# Patient Record
Sex: MALE | Race: WHITE | NOT HISPANIC OR LATINO | Employment: OTHER | URBAN - METROPOLITAN AREA
[De-identification: names, ages, dates, MRNs, and addresses within clinical notes are randomized per-mention and may not be internally consistent; named-entity substitution may affect disease eponyms.]

---

## 2016-05-16 LAB
CHOLEST SERPL-MCNC: 128 MG/DL
HDLC SERPL-MCNC: 50 MG/DL
LDLC SERPL CALC-MCNC: 64 MG/DL
NONHDLC SERPL-MCNC: 78 MG/DL
TRIGL SERPL-MCNC: 72 MG/DL
VLDL CHOLESTEROL: 14 MG/DL

## 2017-01-06 ENCOUNTER — TELEPHONE (OUTPATIENT)
Dept: CARDIOLOGY | Facility: CLINIC | Age: 77
End: 2017-01-06

## 2017-01-06 NOTE — TELEPHONE ENCOUNTER
Spoke with patient regarding propranolol or bystolic.Dr. Allred's recommendations given.  Patient states his fatigue is better on bystolic but the tremors are worse. Patient states he wants to stop the bystolic and try taking the propranolol 10 mg daily as recommended by neurology to see if his tremors get better. Patient will observe his fatigue and call in 2-3 weeks with an update.

## 2017-04-14 ENCOUNTER — TRANSFERRED RECORDS (OUTPATIENT)
Dept: CARDIOLOGY | Facility: CLINIC | Age: 77
End: 2017-04-14

## 2017-04-14 LAB
ALBUMIN SERPL-MCNC: 3.5 G/DL
ALP SERPL-CCNC: 64 U/L
ALT SERPL-CCNC: 35 U/L
ANION GAP SERPL CALCULATED.3IONS-SCNC: 6 MMOL/L
AST SERPL-CCNC: 19 U/L
BILIRUB SERPL-MCNC: 0.4 MG/DL
BUN SERPL-MCNC: 13 MG/DL
CALCIUM SERPL-MCNC: 8.7 MG/DL
CHLORIDE SERPLBLD-SCNC: 105 MMOL/L
CHOLEST SERPL-MCNC: 121 MG/DL
CO2 SERPL-SCNC: 29 MMOL/L
CREAT SERPL-MCNC: 0.8 MG/DL
ERYTHROCYTE [DISTWIDTH] IN BLOOD BY AUTOMATED COUNT: 12 %
GFR SERPL CREATININE-BSD FRML MDRD: NORMAL ML/MIN/1.73M2
GLUCOSE SERPL-MCNC: 95 MG/DL (ref 70–99)
HCT VFR BLD AUTO: 40.1 %
HDLC SERPL-MCNC: 56 MG/DL
HEMOGLOBIN: 13.5 G/DL
LDLC SERPL CALC-MCNC: 46 MG/DL
MCH RBC QN AUTO: 31.3 PG
MCHC RBC AUTO-ENTMCNC: 33.7 G/DL
MCV RBC AUTO: 93 FL
NONHDLC SERPL-MCNC: 65 MG/DL
PLATELET # BLD AUTO: 200 10^9/L
POTASSIUM SERPL-SCNC: 4.6 MMOL/L
PROT SERPL-MCNC: 7.1 G/DL
RBC # BLD AUTO: 4.31 10^12/L
SODIUM SERPL-SCNC: 140 MMOL/L
TRIGL SERPL-MCNC: 94 MG/DL
VLDL CHOLESTEROL: 19 MG/DL
WBC # BLD AUTO: 4.9 10^9/L

## 2017-05-08 ENCOUNTER — TRANSFERRED RECORDS (OUTPATIENT)
Dept: CARDIOLOGY | Facility: CLINIC | Age: 77
End: 2017-05-08

## 2017-05-10 ENCOUNTER — PRE VISIT (OUTPATIENT)
Dept: CARDIOLOGY | Facility: CLINIC | Age: 77
End: 2017-05-10

## 2017-05-10 DIAGNOSIS — I25.10 CORONARY ARTERY DISEASE INVOLVING NATIVE CORONARY ARTERY OF NATIVE HEART WITHOUT ANGINA PECTORIS: ICD-10-CM

## 2017-05-11 NOTE — TELEPHONE ENCOUNTER
Received records from [facility] for April 2017, labs sent to be entered, records sent to scan

## 2017-05-15 DIAGNOSIS — I10 ESSENTIAL HYPERTENSION, BENIGN: Primary | ICD-10-CM

## 2017-06-02 ENCOUNTER — OFFICE VISIT (OUTPATIENT)
Dept: CARDIOLOGY | Facility: CLINIC | Age: 77
End: 2017-06-02
Attending: INTERNAL MEDICINE
Payer: COMMERCIAL

## 2017-06-02 VITALS
SYSTOLIC BLOOD PRESSURE: 120 MMHG | DIASTOLIC BLOOD PRESSURE: 62 MMHG | WEIGHT: 234 LBS | HEIGHT: 68 IN | HEART RATE: 56 BPM | BODY MASS INDEX: 35.46 KG/M2

## 2017-06-02 DIAGNOSIS — E66.09 NON MORBID OBESITY DUE TO EXCESS CALORIES: ICD-10-CM

## 2017-06-02 DIAGNOSIS — E78.00 PURE HYPERCHOLESTEROLEMIA: Chronic | ICD-10-CM

## 2017-06-02 DIAGNOSIS — I25.10 CORONARY ARTERY DISEASE INVOLVING NATIVE CORONARY ARTERY OF NATIVE HEART WITHOUT ANGINA PECTORIS: ICD-10-CM

## 2017-06-02 DIAGNOSIS — I10 ESSENTIAL HYPERTENSION, BENIGN: Primary | ICD-10-CM

## 2017-06-02 PROCEDURE — 99214 OFFICE O/P EST MOD 30 MIN: CPT | Performed by: INTERNAL MEDICINE

## 2017-06-02 RX ORDER — PROPRANOLOL HYDROCHLORIDE 60 MG/1
TABLET ORAL DAILY
COMMUNITY
End: 2021-05-19 | Stop reason: DRUGHIGH

## 2017-06-02 RX ORDER — ATORVASTATIN CALCIUM 20 MG/1
20 TABLET, FILM COATED ORAL DAILY
Qty: 90 TABLET | Refills: 3 | Status: SHIPPED | OUTPATIENT
Start: 2017-06-02 | End: 2018-07-25

## 2017-06-02 RX ORDER — LISINOPRIL 20 MG/1
20 TABLET ORAL DAILY
Qty: 90 TABLET | Refills: 3 | Status: SHIPPED | OUTPATIENT
Start: 2017-06-02 | End: 2017-07-26 | Stop reason: ALTCHOICE

## 2017-06-02 NOTE — LETTER
6/2/2017    [facility]  [address]  [facility] [address]    RE: Tony Bruce       Dear Colleague,    I had the pleasure of seeing Tony Bruce in the [facility].    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  In 09/2005 we stented his left anterior descending artery because of unstable angina, performing rescue angioplasty of his first diagonal.  His last stress test was in 2009 and appeared to be completely normal.  Tony has always had somewhat labile blood pressure.      Tony returns stating he is doing well from a cardiac standpoint without any chest, arm, neck, jaw or shoulder discomfort, no dyspnea on exertion, orthopnea or PND, no palpitations, lightheadedness, dizziness, syncope or near-syncope.  He states he went through his shoulder surgery this past year that was the worst he has ever been through.  He states he had so much pain but had no cardiac issues.      He has no formal exercise regimen.  He follows no regular diet and unfortunately has gained weight.      Last year he was concerned about fatigue, tiredness and lack of energy.  We tried stopping his propranolol which he is on for his resting tremors, and this made no difference in his fatigue.  Ultimately his gabapentin was weaned off, and he states he has got a lot more energy since that change has been made.  He is back on propranolol 60 mg once a day.  He was actually on the immediate-release previously and is much happier on 60 mg slow release.  Not only is his tremors better, he thinks this has helped control his blood pressure better.     Outpatient Encounter Prescriptions as of 6/2/2017   Medication Sig Dispense Refill     propranolol HCl 60 MG TABS Take by mouth daily       atorvastatin  (LIPITOR) 20 MG tablet Take 1 tablet (20 mg) by mouth daily 90 tablet 3     lisinopril (PRINIVIL/ZESTRIL) 20 MG tablet Take 1 tablet (20 mg) by mouth daily 90 tablet 3     ALPRAZolam (XANAX) 0.25 MG tablet Take 0.25 mg by mouth 2 times daily       amoxicillin (AMOXIL) 500 MG capsule Take 500 mg by mouth 3 times daily Before dental work       methocarbamol (ROBAXIN) 500 MG tablet Take 500 mg by mouth 3 times daily as needed        primidone (MYSOLINE) 50 MG tablet Take 50 mg by mouth daily       Aspirin (ASPIR-81 PO)        [DISCONTINUED] nebivolol (BYSTOLIC) 10 MG tablet Take 1 tablet (10 mg) by mouth daily 90 tablet 3     [DISCONTINUED] cetirizine (ZYRTEC) 10 MG tablet Take 10 mg by mouth daily       [DISCONTINUED] lisinopril (PRINIVIL,ZESTRIL) 20 MG tablet Take 1 tablet (20 mg) by mouth daily 90 tablet 3     [DISCONTINUED] atorvastatin (LIPITOR) 20 MG tablet Take 1 tablet (20 mg) by mouth daily 90 tablet 3     No facility-administered encounter medications on file as of 6/2/2017.       ASSESSMENT AND PLAN:  Tony appears to be doing quite well from a cardiac standpoint without clinical evidence of ischemia, heart failure or significant arrhythmia.      Blood pressure is very well controlled today at 120/62 with a pulse of 56.  We talked about propranolol, Bystolic and we will clearly stay with the propranolol because of beneficial effects on the tremors, and he states the fatigue and tiredness is much improved off the gabapentin.      Weight unfortunately is 234 pounds, giving him a body mass index of 35.7.  He is moving towards the morbidly obese category.  This is unfortunately up 5 pounds from last year.  We talked about the fact that he has to do some sort of exercise, and he has got to watch his calorie intake.      Fasting lipid profile is outstanding.  Total cholesterol is 121, HDL 56, LDL 46, triglycerides are 94.  We talked about his fasting lipid profile and compared the trends over the years, and he is  doing outstanding.      We also talked about his basic metabolic profile.  He asks how we know whether his kidneys are functioning okay, and I explained the mechanism of how creatinine works and indirectly measures kidney function.  At this time, he is doing quite well.  We reviewed his medications.  We will continue them all as is.  I gave him refills for his atorvastatin and his lisinopril.  [facility].  I will plan on having him return in 1 year.  If he should have any problems, I would be glad to see him sooner.      Thank you for allowing me to participate in his care.     Sincerely,    Nabeel Allred MD     [facility]

## 2017-06-02 NOTE — MR AVS SNAPSHOT
After Visit Summary   6/2/2017    Tony Bruce    MRN: [id number]           Patient Information     Date Of Birth          1940        Visit Information        Provider Department      6/2/2017 10:15 AM Nabeel Allred MD [facility]        Today's Diagnoses     Essential hypertension, benign    -  1    Coronary artery disease involving native coronary artery of native heart without angina pectoris        Pure hypercholesterolemia        Non morbid obesity due to excess calories           Follow-ups after your visit        Additional Services     Follow-Up with Cardiologist                 Future tests that were ordered for you today     Open Future Orders        Priority Expected Expires Ordered    Basic metabolic panel Routine 6/2/2018 7/7/2018 6/2/2017    Lipid Profile Routine 6/2/2018 7/7/2018 6/2/2017    Follow-Up with Cardiologist Routine 6/2/2018 10/15/2018 6/2/2017            Who to contact     If you have questions or need follow up information about today's clinic visit or your schedule please contact [facility] directly at [phone].  Normal or non-critical lab and imaging results will be communicated to you by TrepUphart, letter or phone within 4 business days after the clinic has received the results. If you do not hear from us within 7 days, please contact the clinic through TrepUphart or phone. If you have a critical or abnormal lab result, we will notify you by phone as soon as possible.  Submit refill requests through theBench or call your pharmacy and they will forward the refill request to us. Please allow 3 business days for your refill to be completed.          Additional Information About Your Visit        TrepUphart Information     theBench lets you send messages to your doctor, view your test results, renew your prescriptions, schedule appointments and more. To sign up, go to  "[email or web address] . Click on \"Log in\" on the left side of the screen, which will take you to the Welcome page. Then click on \"Sign up Now\" on the right side of the page.     You will be asked to enter the access code listed below, as well as some personal information. Please follow the directions to create your username and password.     Your access code is: [email or web address]  Expires: 2017 11:13 AM     Your access code will  in 90 days. If you need help or a new code, please call your [place] clinic or [phone].        Care EveryWhere ID     This is your Care EveryWhere ID. This could be used by other organizations to access your [place] medical records  [id number]        Your Vitals Were     Pulse Height BMI (Body Mass Index)             56 1.727 m (5' 8\") 35.58 kg/m2          Blood Pressure from Last 3 Encounters:   17 120/62   16 118/58   16 158/80    Weight from Last 3 Encounters:   17 106.1 kg (234 lb)   16 103.9 kg (229 lb)   16 105.4 kg (232 lb 6.4 oz)              We Performed the Following     Follow-Up with Cardiac Advanced Practice Provider          Today's Medication Changes          These changes are accurate as of: 17 11:13 AM.  If you have any questions, ask your nurse or doctor.               Stop taking these medicines if you haven't already. Please contact your care team if you have questions.     nebivolol 10 MG tablet   Commonly known as:  BYSTOLIC   Stopped by:  Nabeel Allred MD                Where to get your medicines      Some of these will need a paper prescription and others can be bought over the counter.  Ask your nurse if you have questions.     Bring a paper prescription for each of these medications     atorvastatin 20 MG tablet    lisinopril 20 MG tablet                Primary Care Provider Fax #    [facility] [phone]       [address] [facility] [address]        Thank you!     Thank " you for choosing [facility] [place]  for your care. Our goal is always to provide you with excellent care. Hearing back from our patients is one way we can continue to improve our services. Please take a few minutes to complete the written survey that you may receive in the mail after your visit with us. Thank you!             Your Updated Medication List - Protect others around you: Learn how to safely use, store and throw away your medicines at www.disposemymeds.org.          This list is accurate as of: 6/2/17 11:13 AM.  Always use your most recent med list.                   Brand Name Dispense Instructions for use    ALPRAZolam 0.25 MG tablet    XANAX     Take 0.25 mg by mouth 2 times daily       amoxicillin 500 MG capsule    AMOXIL     Take 500 mg by mouth 3 times daily Before dental work       ASPIR-81 PO          atorvastatin 20 MG tablet    LIPITOR    90 tablet    Take 1 tablet (20 mg) by mouth daily       lisinopril 20 MG tablet    PRINIVIL/ZESTRIL    90 tablet    Take 1 tablet (20 mg) by mouth daily       methocarbamol 500 MG tablet    ROBAXIN     Take 500 mg by mouth 3 times daily as needed       primidone 50 MG tablet    MYSOLINE     Take 50 mg by mouth daily       propranolol HCl 60 MG Tabs      Take by mouth daily

## 2017-06-02 NOTE — PROGRESS NOTES
HPI and Plan:   See dictation    Orders Placed This Encounter   Procedures     Basic metabolic panel     Lipid Profile     Follow-Up with Cardiologist       Orders Placed This Encounter   Medications     propranolol HCl 60 MG TABS     Sig: Take by mouth daily     atorvastatin (LIPITOR) 20 MG tablet     Sig: Take 1 tablet (20 mg) by mouth daily     Dispense:  90 tablet     Refill:  3     lisinopril (PRINIVIL/ZESTRIL) 20 MG tablet     Sig: Take 1 tablet (20 mg) by mouth daily     Dispense:  90 tablet     Refill:  3       Medications Discontinued During This Encounter   Medication Reason     cetirizine (ZYRTEC) 10 MG tablet Stopped by Patient     nebivolol (BYSTOLIC) 10 MG tablet Alternate therapy     atorvastatin (LIPITOR) 20 MG tablet Reorder     lisinopril (PRINIVIL,ZESTRIL) 20 MG tablet Reorder         Encounter Diagnoses   Name Primary?     Coronary artery disease involving native coronary artery of native heart without angina pectoris      Essential hypertension, benign Yes     Pure hypercholesterolemia      Non morbid obesity due to excess calories        CURRENT MEDICATIONS:  Current Outpatient Prescriptions   Medication Sig Dispense Refill     propranolol HCl 60 MG TABS Take by mouth daily       atorvastatin (LIPITOR) 20 MG tablet Take 1 tablet (20 mg) by mouth daily 90 tablet 3     lisinopril (PRINIVIL/ZESTRIL) 20 MG tablet Take 1 tablet (20 mg) by mouth daily 90 tablet 3     ALPRAZolam (XANAX) 0.25 MG tablet Take 0.25 mg by mouth 2 times daily       amoxicillin (AMOXIL) 500 MG capsule Take 500 mg by mouth 3 times daily Before dental work       methocarbamol (ROBAXIN) 500 MG tablet Take 500 mg by mouth 3 times daily as needed        primidone (MYSOLINE) 50 MG tablet Take 50 mg by mouth daily       Aspirin (ASPIR-81 PO)        [DISCONTINUED] lisinopril (PRINIVIL,ZESTRIL) 20 MG tablet Take 1 tablet (20 mg) by mouth daily 90 tablet 3     [DISCONTINUED] atorvastatin (LIPITOR) 20 MG tablet Take 1 tablet (20 mg) by  mouth daily 90 tablet 3       ALLERGIES     Allergies   Allergen Reactions     Morphine      confusion       PAST MEDICAL HISTORY:  Past Medical History:   Diagnosis Date     Coronary atherosclerosis of unspecified type of vessel, native or graft 8/01    cardiac cath 2005: GER to LAD; cath 2002: GER to RCA     Essential hypertension, benign     INTERMITTENT HYPERTENSION     Generalized osteoarthrosis, unspecified site      Hyperlipidaemia      Hypertrophy of prostate without urinary obstruction and other lower urinary tract symptoms (LUTS)     [facility] - Dr Pinto     Impotence of organic origin     Dr Pinto - Dr Allred     NONSPECIFIC MEDICAL HISTORY     CERVICAL/LUMBAR RADICULOPATHY     NONSPECIFIC MEDICAL HISTORY     SHOULDER IMPINGEMENT     NSTEMI (non-ST elevated myocardial infarction) (H)     inferior 2001     Obese      Other and unspecified hyperlipidemia     ?       PAST SURGICAL HISTORY:  Past Surgical History:   Procedure Laterality Date     C TOTAL KNEE ARTHROPLASTY  7/04    Knee Replacement, Total - LEFT Dr Regan     CARDIAC NUC DANISHA STRESS TEST NL  4/09    normal     COLONOSCOPY  6/06    normal - diverticulosis - dr adams     COLONOSCOPY  8/14/2012    Procedure: COLONOSCOPY;  COLONOSCOPY SCREENING/ 6 YEAR FOLLOW UP;  Surgeon: Rey Adams MD;  Location: [facility]     HC REPAIR UMBILICAL CHIKA,5+Y/O,REDUC  9/04    dr dominique     SURGICAL HISTORY OF -   1990    S/P CERVICAL DISCECTOMY x 2     SURGICAL HISTORY OF -   1990    S/P LUMBAR DISCECTOMY x 2     SURGICAL HISTORY OF -   1993    S/P LT CARPAL TUNNEL SURG/SHOULDER IMPINGEMENT     SURGICAL HISTORY OF -   8/01    S/P STENT OF RT CORONARY ARTERY     SURGICAL HISTORY OF -   9/05    Drug eluting stent to LAD       FAMILY HISTORY:  Family History   Problem Relation Age of Onset     Respiratory Father      COPD     CEREBROVASCULAR DISEASE Mother      CVA     C.A.D. Brother      CABG - after CABG x 3 - rheumatic fever as a child     Breast Cancer Sister   "    CEREBROVASCULAR DISEASE Sister        SOCIAL HISTORY:  Social History     Social History     Marital status:      Spouse name: paola     Number of children: 4     Years of education: 14     Social History Main Topics     Smoking status: Never Smoker     Smokeless tobacco: None     Alcohol use No     Drug use: No     Sexual activity: Yes     Partners: Female     Other Topics Concern     Caffeine Concern Yes     3-4 cups qd     Exercise Yes     limited     Seat Belt Yes     Social History Narrative       Review of Systems:  Skin:  Negative       Eyes:  Positive for glasses    ENT:  Negative      Respiratory:  Negative       Cardiovascular:  Negative      Gastroenterology: Negative      Genitourinary:  not assessed      Musculoskeletal:  Positive for arthritis;back pain    Neurologic:  Positive for numbness or tingling of hands    Psychiatric:  Negative      Heme/Lymph/Imm:  Negative      Endocrine:  Negative        Physical Exam:  Vitals: /62  Pulse 56  Ht 1.727 m (5' 8\")  Wt 106.1 kg (234 lb)  BMI 35.58 kg/m2    Constitutional:  cooperative, alert and oriented, well developed, well nourished, in no acute distress obese      Skin:  warm and dry to the touch, no apparent skin lesions or masses noted        Head:  normocephalic, no masses or lesions        Eyes:  pupils equal and round;conjunctivae and lids unremarkable;sclera white;no xanthalasma;no nystagmus        ENT:  no pallor or cyanosis, dentition good        Neck:  carotid pulses are full and equal bilaterally;no carotid bruit        Chest:  normal breath sounds, clear to auscultation, normal A-P diameter, normal symmetry, normal respiratory excursion, no use of accessory muscles          Cardiac: regular rhythm;normal S1 and S2;no S3 or S4;no murmurs, gallops or rubs detected                  Abdomen:    obese      Vascular: pulses full and equal                                        Extremities and Back:  no edema;no spinal " abnormalities noted;normal muscle strength and tone              Neurological:  affect appropriate, oriented to time, person and place;no gross motor deficits              CC  Nabeel Allred MD  [facility]  [address]  [place] [address]

## 2017-06-03 NOTE — PROGRESS NOTES
HISTORY OF PRESENT ILLNESS:  Tony is a very nice 77-year-old gentleman with past medical history significant for hypertension, hypercholesterolemia, central obesity and inferior wall myocardial infarction with stenting of his right coronary artery in 08/2001.  In 09/2005 we stented his left anterior descending artery because of unstable angina, performing rescue angioplasty of his first diagonal.  His last stress test was in 2009 and appeared to be completely normal.  Tony has always had somewhat labile blood pressure.      Tony returns stating he is doing well from a cardiac standpoint without any chest, arm, neck, jaw or shoulder discomfort, no dyspnea on exertion, orthopnea or PND, no palpitations, lightheadedness, dizziness, syncope or near-syncope.  He states he went through his shoulder surgery this past year that was the worst he has ever been through.  He states he had so much pain but had no cardiac issues.      He has no formal exercise regimen.  He follows no regular diet and unfortunately has gained weight.      Last year he was concerned about fatigue, tiredness and lack of energy.  We tried stopping his propranolol which he is on for his resting tremors, and this made no difference in his fatigue.  Ultimately his gabapentin was weaned off, and he states he has got a lot more energy since that change has been made.  He is back on propranolol 60 mg once a day.  He was actually on the immediate-release previously and is much happier on 60 mg slow release.  Not only is his tremors better, he thinks this has helped control his blood pressure better.      ASSESSMENT AND PLAN:  Tony appears to be doing quite well from a cardiac standpoint without clinical evidence of ischemia, heart failure or significant arrhythmia.      Blood pressure is very well controlled today at 120/62 with a pulse of 56.  We talked about propranolol, Bystolic and we will clearly stay with the propranolol because of beneficial  effects on the tremors, and he states the fatigue and tiredness is much improved off the gabapentin.      Weight unfortunately is 234 pounds, giving him a body mass index of 35.7.  He is moving towards the morbidly obese category.  This is unfortunately up 5 pounds from last year.  We talked about the fact that he has to do some sort of exercise, and he has got to watch his calorie intake.      Fasting lipid profile is outstanding.  Total cholesterol is 121, HDL 56, LDL 46, triglycerides are 94.  We talked about his fasting lipid profile and compared the trends over the years, and he is doing outstanding.      We also talked about his basic metabolic profile.  He asks how we know whether his kidneys are functioning okay, and I explained the mechanism of how creatinine works and indirectly measures kidney function.  At this time, he is doing quite well.  We reviewed his medications.  We will continue them all as is.  I gave him refills for his atorvastatin and his lisinopril.  [facility].  I will plan on having him return in 1 year.  If he should have any problems, I would be glad to see him sooner.      Thank you for allowing me to participate in his care.         BARBIE ROBERTO MD, [facility]             D: 2017 11:12   T: 2017 21:56   MT: DIDIER      Name:     SHANA PAYNE   MRN:      [id number]        Account:      [id number]   :      1940           Service Date: 2017      Document: [id number]

## 2017-07-26 ENCOUNTER — TELEPHONE (OUTPATIENT)
Dept: CARDIOLOGY | Facility: CLINIC | Age: 77
End: 2017-07-26

## 2017-07-26 DIAGNOSIS — I10 BENIGN ESSENTIAL HYPERTENSION: Primary | ICD-10-CM

## 2017-07-26 RX ORDER — LOSARTAN POTASSIUM 25 MG/1
25 TABLET ORAL DAILY
Qty: 90 TABLET | Refills: 3 | Status: SHIPPED | OUTPATIENT
Start: 2017-07-26 | End: 2017-08-28

## 2017-08-25 ENCOUNTER — DOCUMENTATION ONLY (OUTPATIENT)
Dept: CARDIOLOGY | Facility: CLINIC | Age: 77
End: 2017-08-25

## 2017-08-25 DIAGNOSIS — I10 BENIGN ESSENTIAL HYPERTENSION: ICD-10-CM

## 2017-08-25 NOTE — PROGRESS NOTES
[facility] losartan. Per telephone notes in July, patient called for an alternative medication and dosage after [facility]. Dr. Allred recommended losartan 25mg daily as a replacement, script and paperwork notes [facility] on [facility]17. Faxed copy of telephone exchange to [facility].  Attempted to contact patient to ask what dose he is currently being given by the [facility] and how well he is tolerating this. Left message for patient to call back.    8/28/17 11:30 Patient stopped at the clinic this AM with [facility]. Patient was issued losartan 50mg daily [facility] on 7/26/17. Patient states since using this dose he has felt weaker than usual with some concerns about pains in his knees and shoulder. Patient's home BP is 137/70. Reviewed with patient that [facility] from [facility] with dose discrepancy was received 8/25/17 and that telephone notes were faxed to [facility] to review. Patient requested a copy of his script from Dr. Allred for losartan 25mg and the phone notes on paper, he will [facility] these to the [facility] to discuss as he would prefer to be using losartan 25mg daily as recommended by Dr. Allred.     Patient will call back if he needs further assistance.

## 2017-08-28 RX ORDER — LOSARTAN POTASSIUM 25 MG/1
25 TABLET ORAL DAILY
Qty: 90 TABLET | Refills: 3 | Status: SHIPPED | OUTPATIENT
Start: 2017-08-28 | End: 2018-07-25 | Stop reason: DRUGHIGH

## 2017-09-05 ENCOUNTER — MEDICAL CORRESPONDENCE (OUTPATIENT)
Dept: HEALTH INFORMATION MANAGEMENT | Facility: CLINIC | Age: 77
End: 2017-09-05

## 2017-09-11 ENCOUNTER — DOCUMENTATION ONLY (OUTPATIENT)
Dept: CARDIOLOGY | Facility: CLINIC | Age: 77
End: 2017-09-11

## 2017-09-11 NOTE — PROGRESS NOTES
"Letter from Dr. Riggs, Neurology at the [facility]. Letter notes patient c/o of generalized arthralgia since starting losartan. Provider asking if Dr. Miller feels this is related to the losartan. [facility] by [facility] as an alternative to lisinopril (side effect of cough).   Will message Dr. Allred to review.    Per Dr. Allred's recommendation \"I do not think so but we try a 2 week holiday\"  Called patient with recommendation to try holding losartan for 2 weeks. Patient states he has a poor memory but will try to call with an update in 2 weeks.  "

## 2017-09-26 ENCOUNTER — TELEPHONE (OUTPATIENT)
Dept: CARDIOLOGY | Facility: CLINIC | Age: 77
End: 2017-09-26

## 2017-09-26 NOTE — TELEPHONE ENCOUNTER
Contacted patient to review effect of holding losartan on his arthralgia symptoms. Patient states he did notice any improvement and will restart his losartan at the previous dose.

## 2017-10-12 ENCOUNTER — TRANSFERRED RECORDS (OUTPATIENT)
Dept: HEALTH INFORMATION MANAGEMENT | Facility: CLINIC | Age: 77
End: 2017-10-12

## 2017-10-12 LAB
ALBUMIN SERPL-MCNC: 3.7 G/DL
ALP SERPL-CCNC: 58 U/L
ALT SERPL-CCNC: 33 U/L
ANION GAP SERPL CALCULATED.3IONS-SCNC: 5 MMOL/L
AST SERPL-CCNC: 24 U/L
BILIRUB SERPL-MCNC: 0.3 MG/DL
BUN SERPL-MCNC: 21 MG/DL
CALCIUM SERPL-MCNC: 8.7 MG/DL
CHLORIDE SERPLBLD-SCNC: 108 MMOL/L
CHOLEST SERPL-MCNC: 148 MG/DL
CO2 SERPL-SCNC: 27 MMOL/L
CREAT SERPL-MCNC: 0.8 MG/DL
GFR SERPL CREATININE-BSD FRML MDRD: >60 ML/MIN/1.73M2
GLUCOSE SERPL-MCNC: 82 MG/DL (ref 74–106)
HDLC SERPL-MCNC: 62 MG/DL
LDLC SERPL CALC-MCNC: 69 MG/DL
NONHDLC SERPL-MCNC: NORMAL MG/DL
POTASSIUM SERPL-SCNC: 4.1 MMOL/L
PROT SERPL-MCNC: 7.3 G/DL
SODIUM SERPL-SCNC: 140 MMOL/L
TRIGL SERPL-MCNC: 84 MG/DL

## 2018-05-09 ENCOUNTER — TRANSFERRED RECORDS (OUTPATIENT)
Dept: HEALTH INFORMATION MANAGEMENT | Facility: CLINIC | Age: 78
End: 2018-05-09

## 2018-05-14 ENCOUNTER — TRANSFERRED RECORDS (OUTPATIENT)
Dept: HEALTH INFORMATION MANAGEMENT | Facility: CLINIC | Age: 78
End: 2018-05-14

## 2018-06-25 ENCOUNTER — TRANSFERRED RECORDS (OUTPATIENT)
Dept: HEALTH INFORMATION MANAGEMENT | Facility: CLINIC | Age: 78
End: 2018-06-25

## 2018-07-12 ENCOUNTER — PRE VISIT (OUTPATIENT)
Dept: CARDIOLOGY | Facility: CLINIC | Age: 78
End: 2018-07-12

## 2018-07-12 DIAGNOSIS — E78.00 PURE HYPERCHOLESTEROLEMIA: ICD-10-CM

## 2018-07-12 DIAGNOSIS — I10 BENIGN ESSENTIAL HYPERTENSION: Primary | ICD-10-CM

## 2018-07-25 ENCOUNTER — OFFICE VISIT (OUTPATIENT)
Dept: CARDIOLOGY | Facility: CLINIC | Age: 78
End: 2018-07-25
Payer: COMMERCIAL

## 2018-07-25 VITALS
DIASTOLIC BLOOD PRESSURE: 80 MMHG | WEIGHT: 240.5 LBS | SYSTOLIC BLOOD PRESSURE: 138 MMHG | BODY MASS INDEX: 36.45 KG/M2 | HEART RATE: 62 BPM | HEIGHT: 68 IN

## 2018-07-25 DIAGNOSIS — E87.5 HYPERKALEMIA: ICD-10-CM

## 2018-07-25 DIAGNOSIS — I25.10 CORONARY ARTERY DISEASE INVOLVING NATIVE CORONARY ARTERY OF NATIVE HEART WITHOUT ANGINA PECTORIS: Primary | ICD-10-CM

## 2018-07-25 DIAGNOSIS — I25.10 CORONARY ARTERY DISEASE INVOLVING NATIVE CORONARY ARTERY OF NATIVE HEART WITHOUT ANGINA PECTORIS: ICD-10-CM

## 2018-07-25 DIAGNOSIS — I10 ESSENTIAL HYPERTENSION, BENIGN: ICD-10-CM

## 2018-07-25 DIAGNOSIS — E78.00 PURE HYPERCHOLESTEROLEMIA: Primary | Chronic | ICD-10-CM

## 2018-07-25 DIAGNOSIS — E66.09 NON MORBID OBESITY DUE TO EXCESS CALORIES: ICD-10-CM

## 2018-07-25 DIAGNOSIS — G47.33 OSA (OBSTRUCTIVE SLEEP APNEA): Chronic | ICD-10-CM

## 2018-07-25 DIAGNOSIS — E78.00 PURE HYPERCHOLESTEROLEMIA: ICD-10-CM

## 2018-07-25 DIAGNOSIS — I10 BENIGN ESSENTIAL HYPERTENSION: ICD-10-CM

## 2018-07-25 DIAGNOSIS — N52.9 IMPOTENCE OF ORGANIC ORIGIN: ICD-10-CM

## 2018-07-25 LAB
ANION GAP SERPL CALCULATED.3IONS-SCNC: 12.3 MMOL/L (ref 6–17)
BUN SERPL-MCNC: 17 MG/DL (ref 7–30)
CALCIUM SERPL-MCNC: 9 MG/DL (ref 8.5–10.5)
CHLORIDE SERPL-SCNC: 104 MMOL/L (ref 98–107)
CHOLEST SERPL-MCNC: 145 MG/DL
CO2 SERPL-SCNC: 30 MMOL/L (ref 23–29)
CREAT SERPL-MCNC: 1 MG/DL (ref 0.7–1.3)
GFR SERPL CREATININE-BSD FRML MDRD: 72 ML/MIN/1.7M2
GLUCOSE SERPL-MCNC: 101 MG/DL (ref 70–105)
HDLC SERPL-MCNC: 46 MG/DL
LDLC SERPL CALC-MCNC: 77 MG/DL
NONHDLC SERPL-MCNC: 99 MG/DL
POTASSIUM SERPL-SCNC: 5.3 MMOL/L (ref 3.5–5.1)
PSA SERPL-ACNC: 4.87 UG/L (ref 0–4)
SODIUM SERPL-SCNC: 141 MMOL/L (ref 136–145)
TRIGL SERPL-MCNC: 112 MG/DL

## 2018-07-25 PROCEDURE — G0103 PSA SCREENING: HCPCS | Performed by: INTERNAL MEDICINE

## 2018-07-25 PROCEDURE — 80061 LIPID PANEL: CPT | Performed by: INTERNAL MEDICINE

## 2018-07-25 PROCEDURE — 80048 BASIC METABOLIC PNL TOTAL CA: CPT | Performed by: INTERNAL MEDICINE

## 2018-07-25 PROCEDURE — 99214 OFFICE O/P EST MOD 30 MIN: CPT | Performed by: INTERNAL MEDICINE

## 2018-07-25 PROCEDURE — 82306 VITAMIN D 25 HYDROXY: CPT | Performed by: INTERNAL MEDICINE

## 2018-07-25 PROCEDURE — 36415 COLL VENOUS BLD VENIPUNCTURE: CPT | Performed by: INTERNAL MEDICINE

## 2018-07-25 RX ORDER — ATORVASTATIN CALCIUM 20 MG/1
20 TABLET, FILM COATED ORAL DAILY
Qty: 90 TABLET | Refills: 3 | Status: SHIPPED | OUTPATIENT
Start: 2018-07-25 | End: 2019-07-25

## 2018-07-25 RX ORDER — LOSARTAN POTASSIUM 50 MG/1
50 TABLET ORAL DAILY
Qty: 90 TABLET | Refills: 3 | Status: SHIPPED | OUTPATIENT
Start: 2018-07-25 | End: 2019-07-25

## 2018-07-25 RX ORDER — DOCUSATE SODIUM 100 MG/1
100 CAPSULE, LIQUID FILLED ORAL 2 TIMES DAILY
COMMUNITY
End: 2020-04-08

## 2018-07-25 RX ORDER — POLYETHYLENE GLYCOL 3350 17 G/17G
1 POWDER, FOR SOLUTION ORAL PRN
COMMUNITY

## 2018-07-25 RX ORDER — FAMOTIDINE 20 MG
TABLET ORAL DAILY
COMMUNITY

## 2018-07-25 RX ORDER — LOSARTAN POTASSIUM 50 MG/1
50 TABLET ORAL DAILY
COMMUNITY
End: 2018-07-25

## 2018-07-25 RX ORDER — OMEGA-3 FATTY ACIDS/FISH OIL 300-1000MG
800 CAPSULE ORAL 2 TIMES DAILY
COMMUNITY
End: 2020-04-08 | Stop reason: DRUGHIGH

## 2018-07-25 RX ORDER — HYDROCORTISONE 2.5 %
CREAM (GRAM) TOPICAL 2 TIMES DAILY
COMMUNITY

## 2018-07-25 RX ORDER — UREA 40 %
CREAM (GRAM) TOPICAL
COMMUNITY
End: 2024-05-01

## 2018-07-25 RX ORDER — LIDOCAINE 50 MG/G
1 PATCH TOPICAL
COMMUNITY

## 2018-07-25 NOTE — PROGRESS NOTES
HPI and Plan:   See dictation    Orders Placed This Encounter   Procedures     Basic metabolic panel     Basic metabolic panel     Lipid Profile     Follow-Up with Cardiologist       Orders Placed This Encounter   Medications     aspirin 81 MG tablet     Sig: Take 81 mg by mouth daily     DISCONTD: losartan (COZAAR) 50 MG tablet     Sig: Take 50 mg by mouth daily     Calcium Carb-Cholecalciferol (CALCIUM-VITAMIN D3) 250-125 MG-UNIT TABS     Sig: Take 2 tablets by mouth 2 times daily     Vitamin D, Cholecalciferol, 1000 units CAPS     Sig: Take by mouth daily     diclofenac (VOLTAREN) 1 % GEL topical gel     Sig: Place onto the skin as needed for moderate pain     docusate sodium (COLACE) 100 MG capsule     Sig: Take 100 mg by mouth 2 times daily     hydrocortisone 2.5 % cream     Sig: Apply topically 2 times daily     ibuprofen 200 MG capsule     Sig: Take 800 mg by mouth 2 times daily     lidocaine (LIDODERM) 5 % Patch     Sig: Place 1 patch onto the skin     polyethylene glycol (MIRALAX/GLYCOLAX) Packet     Sig: Take 1 packet by mouth as needed for constipation     psyllium 0.52 g capsule     Sig: Take 1 capsule by mouth daily     Urea 40 % CREA     atorvastatin (LIPITOR) 20 MG tablet     Sig: Take 1 tablet (20 mg) by mouth daily     Dispense:  90 tablet     Refill:  3     losartan (COZAAR) 50 MG tablet     Sig: Take 1 tablet (50 mg) by mouth daily     Dispense:  90 tablet     Refill:  3       Medications Discontinued During This Encounter   Medication Reason     Aspirin (ASPIR-81 PO) Medication Reconciliation Clean Up     losartan (COZAAR) 25 MG tablet Dose adjustment     atorvastatin (LIPITOR) 20 MG tablet Reorder     losartan (COZAAR) 50 MG tablet Reorder         Encounter Diagnoses   Name Primary?     Pure hypercholesterolemia Yes     Essential hypertension, benign      Coronary artery disease involving native coronary artery of native heart without angina pectoris      Non morbid obesity due to excess  calories      Impotence of organic origin      LIZANDRO (obstructive sleep apnea)      Hyperkalemia        CURRENT MEDICATIONS:  Current Outpatient Prescriptions   Medication Sig Dispense Refill     ALPRAZolam (XANAX) 0.25 MG tablet Take 0.25 mg by mouth 2 times daily       amoxicillin (AMOXIL) 500 MG capsule Take 500 mg by mouth 3 times daily Before dental work       aspirin 81 MG tablet Take 81 mg by mouth daily       atorvastatin (LIPITOR) 20 MG tablet Take 1 tablet (20 mg) by mouth daily 90 tablet 3     Calcium Carb-Cholecalciferol (CALCIUM-VITAMIN D3) 250-125 MG-UNIT TABS Take 2 tablets by mouth 2 times daily       diclofenac (VOLTAREN) 1 % GEL topical gel Place onto the skin as needed for moderate pain       docusate sodium (COLACE) 100 MG capsule Take 100 mg by mouth 2 times daily       hydrocortisone 2.5 % cream Apply topically 2 times daily       ibuprofen 200 MG capsule Take 800 mg by mouth 2 times daily       lidocaine (LIDODERM) 5 % Patch Place 1 patch onto the skin       losartan (COZAAR) 50 MG tablet Take 1 tablet (50 mg) by mouth daily 90 tablet 3     polyethylene glycol (MIRALAX/GLYCOLAX) Packet Take 1 packet by mouth as needed for constipation       primidone (MYSOLINE) 50 MG tablet Take 250 mg by mouth 2 times daily        propranolol HCl 60 MG TABS Take by mouth daily       psyllium 0.52 g capsule Take 1 capsule by mouth daily       Urea 40 % CREA        Vitamin D, Cholecalciferol, 1000 units CAPS Take by mouth daily       methocarbamol (ROBAXIN) 500 MG tablet Take 500 mg by mouth 3 times daily as needed        [DISCONTINUED] atorvastatin (LIPITOR) 20 MG tablet Take 1 tablet (20 mg) by mouth daily 90 tablet 3     [DISCONTINUED] losartan (COZAAR) 25 MG tablet Take 1 tablet (25 mg) by mouth daily 90 tablet 3     [DISCONTINUED] losartan (COZAAR) 50 MG tablet Take 50 mg by mouth daily         ALLERGIES     Allergies   Allergen Reactions     Morphine      confusion       PAST MEDICAL HISTORY:  Past Medical  History:   Diagnosis Date     Coronary atherosclerosis of unspecified type of vessel, native or graft 8/01    cardiac cath 2005: GER to LAD; cath 2002: GER to RCA     Essential hypertension, benign     INTERMITTENT HYPERTENSION     Generalized osteoarthrosis, unspecified site      Hyperlipidaemia      Hypertrophy of prostate without urinary obstruction and other lower urinary tract symptoms (LUTS)     BPH - Dr Pinto     Impotence of organic origin     Dr Pinto - Dr Allred     NONSPECIFIC MEDICAL HISTORY     CERVICAL/LUMBAR RADICULOPATHY     NONSPECIFIC MEDICAL HISTORY     SHOULDER IMPINGEMENT     NSTEMI (non-ST elevated myocardial infarction) (H)     inferior 2001     Obese      Other and unspecified hyperlipidemia     ?       PAST SURGICAL HISTORY:  Past Surgical History:   Procedure Laterality Date     C TOTAL KNEE ARTHROPLASTY  7/04    Knee Replacement, Total - LEFT Dr Regan     CARDIAC NUC DANISHA STRESS TEST NL  4/09    normal     COLONOSCOPY  6/06    normal - diverticulosis - dr adams     COLONOSCOPY  8/14/2012    Procedure: COLONOSCOPY;  COLONOSCOPY SCREENING/ 6 YEAR FOLLOW UP;  Surgeon: Rey Adams MD;  Location: [facility]     HC REPAIR UMBILICAL CHIKA,5+Y/O,REDUC  9/04    dr dominique     SURGICAL HISTORY OF -   1990    S/P CERVICAL DISCECTOMY x 2     SURGICAL HISTORY OF -   1990    S/P LUMBAR DISCECTOMY x 2     SURGICAL HISTORY OF -   1993    S/P LT CARPAL TUNNEL SURG/SHOULDER IMPINGEMENT     SURGICAL HISTORY OF -   8/01    S/P STENT OF RT CORONARY ARTERY     SURGICAL HISTORY OF -   9/05    Drug eluting stent to LAD       FAMILY HISTORY:  Family History   Problem Relation Age of Onset     Respiratory Father      COPD     Cerebrovascular Disease Mother      CVA     C.A.D. Brother      CABG - after CABG x 3 - rheumatic fever as a child     Breast Cancer Sister      Cerebrovascular Disease Sister        SOCIAL HISTORY:  Social History     Social History     Marital status:      Spouse name: paola  "    Number of children: 4     Years of education: 14     Social History Main Topics     Smoking status: Never Smoker     Smokeless tobacco: Never Used     Alcohol use No     Drug use: No     Sexual activity: Yes     Partners: Female     Other Topics Concern     Caffeine Concern Yes     3-4 cups qd     Exercise Yes     limited     Seat Belt Yes     Social History Narrative       Review of Systems:  Skin:  Negative       Eyes:  Positive for glasses    ENT:  Negative      Respiratory:  Negative       Cardiovascular:  Negative      Gastroenterology: Negative      Genitourinary:  not assessed      Musculoskeletal:  Positive for arthritis;joint pain    Neurologic:  Positive for      Psychiatric:  Positive for sleep disturbances    Heme/Lymph/Imm:  Positive for allergies    Endocrine:  Negative        Physical Exam:  Vitals: /80  Pulse 62  Ht 1.727 m (5' 8\")  Wt 109.1 kg (240 lb 8 oz)  BMI 36.57 kg/m2    Constitutional:  cooperative, alert and oriented, well developed, well nourished, in no acute distress obese      Skin:  warm and dry to the touch, no apparent skin lesions or masses noted          Head:  normocephalic, no masses or lesions        Eyes:  pupils equal and round;conjunctivae and lids unremarkable;sclera white;no xanthalasma;no nystagmus        Lymph:      ENT:  no pallor or cyanosis, dentition good        Neck:  carotid pulses are full and equal bilaterally;no carotid bruit        Respiratory:  normal breath sounds, clear to auscultation, normal A-P diameter, normal symmetry, normal respiratory excursion, no use of accessory muscles         Cardiac: regular rhythm;normal S1 and S2;no S3 or S4;no murmurs, gallops or rubs detected                pulses full and equal                                        GI:    obese      Extremities and Muscular Skeletal:  no edema;no spinal abnormalities noted;normal muscle strength and tone              Neurological:  no gross motor deficits        Psych:  affect " appropriate, oriented to time, person and place        CC  No referring provider defined for this encounter.

## 2018-07-25 NOTE — LETTER
7/25/2018    [facility]  [address] [facility] [address]    RE: Tony Bruce       Dear Colleague,    I had the pleasure of seeing Tony Bruce in the [facility].    HPI and Plan:   See dictation    Orders Placed This Encounter   Procedures     Basic metabolic panel     Basic metabolic panel     Lipid Profile     Follow-Up with Cardiologist       Orders Placed This Encounter   Medications     aspirin 81 MG tablet     Sig: Take 81 mg by mouth daily     DISCONTD: losartan (COZAAR) 50 MG tablet     Sig: Take 50 mg by mouth daily     Calcium Carb-Cholecalciferol (CALCIUM-VITAMIN D3) 250-125 MG-UNIT TABS     Sig: Take 2 tablets by mouth 2 times daily     Vitamin D, Cholecalciferol, 1000 units CAPS     Sig: Take by mouth daily     diclofenac (VOLTAREN) 1 % GEL topical gel     Sig: Place onto the skin as needed for moderate pain     docusate sodium (COLACE) 100 MG capsule     Sig: Take 100 mg by mouth 2 times daily     hydrocortisone 2.5 % cream     Sig: Apply topically 2 times daily     ibuprofen 200 MG capsule     Sig: Take 800 mg by mouth 2 times daily     lidocaine (LIDODERM) 5 % Patch     Sig: Place 1 patch onto the skin     polyethylene glycol (MIRALAX/GLYCOLAX) Packet     Sig: Take 1 packet by mouth as needed for constipation     psyllium 0.52 g capsule     Sig: Take 1 capsule by mouth daily     Urea 40 % CREA     atorvastatin (LIPITOR) 20 MG tablet     Sig: Take 1 tablet (20 mg) by mouth daily     Dispense:  90 tablet     Refill:  3     losartan (COZAAR) 50 MG tablet     Sig: Take 1 tablet (50 mg) by mouth daily     Dispense:  90 tablet     Refill:  3       Medications Discontinued During This Encounter   Medication Reason     Aspirin (ASPIR-81 PO) Medication Reconciliation Clean Up     losartan (COZAAR) 25 MG tablet Dose adjustment     atorvastatin (LIPITOR) 20 MG tablet Reorder     losartan (COZAAR) 50 MG tablet Reorder         Encounter Diagnoses    Name Primary?     Pure hypercholesterolemia Yes     Essential hypertension, benign      Coronary artery disease involving native coronary artery of native heart without angina pectoris      Non morbid obesity due to excess calories      Impotence of organic origin      LIZANDRO (obstructive sleep apnea)      Hyperkalemia        CURRENT MEDICATIONS:  Current Outpatient Prescriptions   Medication Sig Dispense Refill     ALPRAZolam (XANAX) 0.25 MG tablet Take 0.25 mg by mouth 2 times daily       amoxicillin (AMOXIL) 500 MG capsule Take 500 mg by mouth 3 times daily Before dental work       aspirin 81 MG tablet Take 81 mg by mouth daily       atorvastatin (LIPITOR) 20 MG tablet Take 1 tablet (20 mg) by mouth daily 90 tablet 3     Calcium Carb-Cholecalciferol (CALCIUM-VITAMIN D3) 250-125 MG-UNIT TABS Take 2 tablets by mouth 2 times daily       diclofenac (VOLTAREN) 1 % GEL topical gel Place onto the skin as needed for moderate pain       docusate sodium (COLACE) 100 MG capsule Take 100 mg by mouth 2 times daily       hydrocortisone 2.5 % cream Apply topically 2 times daily       ibuprofen 200 MG capsule Take 800 mg by mouth 2 times daily       lidocaine (LIDODERM) 5 % Patch Place 1 patch onto the skin       losartan (COZAAR) 50 MG tablet Take 1 tablet (50 mg) by mouth daily 90 tablet 3     polyethylene glycol (MIRALAX/GLYCOLAX) Packet Take 1 packet by mouth as needed for constipation       primidone (MYSOLINE) 50 MG tablet Take 250 mg by mouth 2 times daily        propranolol HCl 60 MG TABS Take by mouth daily       psyllium 0.52 g capsule Take 1 capsule by mouth daily       Urea 40 % CREA        Vitamin D, Cholecalciferol, 1000 units CAPS Take by mouth daily       methocarbamol (ROBAXIN) 500 MG tablet Take 500 mg by mouth 3 times daily as needed        [DISCONTINUED] atorvastatin (LIPITOR) 20 MG tablet Take 1 tablet (20 mg) by mouth daily 90 tablet 3     [DISCONTINUED] losartan (COZAAR) 25 MG tablet Take 1 tablet (25 mg)  by mouth daily 90 tablet 3     [DISCONTINUED] losartan (COZAAR) 50 MG tablet Take 50 mg by mouth daily         ALLERGIES     Allergies   Allergen Reactions     Morphine      confusion       PAST MEDICAL HISTORY:  Past Medical History:   Diagnosis Date     Coronary atherosclerosis of unspecified type of vessel, native or graft 8/01    cardiac cath 2005: GER to LAD; cath 2002: GER to RCA     Essential hypertension, benign     INTERMITTENT HYPERTENSION     Generalized osteoarthrosis, unspecified site      Hyperlipidaemia      Hypertrophy of prostate without urinary obstruction and other lower urinary tract symptoms (LUTS)     BPH - Dr Pinto     Impotence of organic origin     Dr Pinto - Dr Allred     NONSPECIFIC MEDICAL HISTORY     CERVICAL/LUMBAR RADICULOPATHY     NONSPECIFIC MEDICAL HISTORY     SHOULDER IMPINGEMENT     NSTEMI (non-ST elevated myocardial infarction) (H)     inferior 2001     Obese      Other and unspecified hyperlipidemia     ?       PAST SURGICAL HISTORY:  Past Surgical History:   Procedure Laterality Date     C TOTAL KNEE ARTHROPLASTY  7/04    Knee Replacement, Total - LEFT Dr Regan     CARDIAC NUC DANISHA STRESS TEST NL  4/09    normal     COLONOSCOPY  6/06    normal - diverticulosis - dr adams     COLONOSCOPY  8/14/2012    Procedure: COLONOSCOPY;  COLONOSCOPY SCREENING/ 6 YEAR FOLLOW UP;  Surgeon: Rey Adams MD;  Location: [facility]     HC REPAIR UMBILICAL CHIKA,5+Y/O,REDUC  9/04    dr dominique     SURGICAL HISTORY OF -   1990    S/P CERVICAL DISCECTOMY x 2     SURGICAL HISTORY OF -   1990    S/P LUMBAR DISCECTOMY x 2     SURGICAL HISTORY OF -   1993    S/P LT CARPAL TUNNEL SURG/SHOULDER IMPINGEMENT     SURGICAL HISTORY OF -   8/01    S/P STENT OF RT CORONARY ARTERY     SURGICAL HISTORY OF -   9/05    Drug eluting stent to LAD       FAMILY HISTORY:  Family History   Problem Relation Age of Onset     Respiratory Father      COPD     Cerebrovascular Disease Mother      CVA     C.A.D. Brother   "    CABG - after CABG x 3 - rheumatic fever as a child     Breast Cancer Sister      Cerebrovascular Disease Sister        SOCIAL HISTORY:  Social History     Social History     Marital status:      Spouse name: paola     Number of children: 4     Years of education: 14     Social History Main Topics     Smoking status: Never Smoker     Smokeless tobacco: Never Used     Alcohol use No     Drug use: No     Sexual activity: Yes     Partners: Female     Other Topics Concern     Caffeine Concern Yes     3-4 cups qd     Exercise Yes     limited     Seat Belt Yes     Social History Narrative       Review of Systems:  Skin:  Negative       Eyes:  Positive for glasses    ENT:  Negative      Respiratory:  Negative       Cardiovascular:  Negative      Gastroenterology: Negative      Genitourinary:  not assessed      Musculoskeletal:  Positive for arthritis;joint pain    Neurologic:  Positive for      Psychiatric:  Positive for sleep disturbances    Heme/Lymph/Imm:  Positive for allergies    Endocrine:  Negative        Physical Exam:  Vitals: /80  Pulse 62  Ht 1.727 m (5' 8\")  Wt 109.1 kg (240 lb 8 oz)  BMI 36.57 kg/m2    Constitutional:  cooperative, alert and oriented, well developed, well nourished, in no acute distress obese      Skin:  warm and dry to the touch, no apparent skin lesions or masses noted          Head:  normocephalic, no masses or lesions        Eyes:  pupils equal and round;conjunctivae and lids unremarkable;sclera white;no xanthalasma;no nystagmus        Lymph:      ENT:  no pallor or cyanosis, dentition good        Neck:  carotid pulses are full and equal bilaterally;no carotid bruit        Respiratory:  normal breath sounds, clear to auscultation, normal A-P diameter, normal symmetry, normal respiratory excursion, no use of accessory muscles         Cardiac: regular rhythm;normal S1 and S2;no S3 or S4;no murmurs, gallops or rubs detected                pulses full and equal             "                            GI:    obese      Extremities and Muscular Skeletal:  no edema;no spinal abnormalities noted;normal muscle strength and tone              Neurological:  no gross motor deficits        Psych:  affect appropriate, oriented to time, person and place        CC  No referring provider defined for this encounter.                Thank you for allowing me to participate in the care of your patient.      Sincerely,     Nabeel Allred MD     [facility]    cc:   No referring provider defined for this encounter.

## 2018-07-25 NOTE — MR AVS SNAPSHOT
After Visit Summary   7/25/2018    Tony Bruce    MRN: [id number]           Patient Information     Date Of Birth          1940        Visit Information        Provider Department      7/25/2018 9:15 AM Nabeel Allred MD [facility]        Today's Diagnoses     Pure hypercholesterolemia    -  1    Essential hypertension, benign        Coronary artery disease involving native coronary artery of native heart without angina pectoris        Non morbid obesity due to excess calories        Impotence of organic origin        LIZANDRO (obstructive sleep apnea)        Hyperkalemia           Follow-ups after your visit        Additional Services     Follow-Up with Cardiologist                 Future tests that were ordered for you today     Open Future Orders        Priority Expected Expires Ordered    Basic metabolic panel Routine 7/25/2019 7/26/2019 7/25/2018    Lipid Profile Routine 7/25/2019 7/26/2019 7/25/2018    Follow-Up with Cardiologist Routine 7/25/2019 7/26/2019 7/25/2018    Basic metabolic panel Routine 8/1/2018 7/25/2019 7/25/2018            Who to contact     If you have questions or need follow up information about today's clinic visit or your schedule please contact [facility] directly at [phone].  Normal or non-critical lab and imaging results will be communicated to you by MyChart, letter or phone within 4 business days after the clinic has received the results. If you do not hear from us within 7 days, please contact the clinic through MyChart or phone. If you have a critical or abnormal lab result, we will notify you by phone as soon as possible.  Submit refill requests through [employer] or call your pharmacy and they will forward the refill request to us. Please allow 3 business days for your refill to be completed.          Additional Information About Your Visit        MyChart Information     [facility] [employer]  "lets you send messages to your doctor, view your test results, renew your prescriptions, schedule appointments and more. To sign up, go to [email or web address] . Click on \"Log in\" on the left side of the screen, which will take you to the Welcome page. Then click on \"Sign up Now\" on the right side of the page.     You will be asked to enter the access code listed below, as well as some personal information. Please follow the directions to create your username and password.     Your access code is: [email or web address]  Expires: 10/23/2018 10:16 AM     Your access code will  in 90 days. If you need help or a new code, please call your [facility] clinic or [phone].        Care EveryWhere ID     This is your Care EveryWhere ID. This could be used by other organizations to access your [facility] medical records  [id number]        Your Vitals Were     Pulse Height BMI (Body Mass Index)             62 1.727 m (5' 8\") 36.57 kg/m2          Blood Pressure from Last 3 Encounters:   18 138/80   17 120/62   16 118/58    Weight from Last 3 Encounters:   18 109.1 kg (240 lb 8 oz)   17 106.1 kg (234 lb)   16 103.9 kg (229 lb)                 Today's Medication Changes          These changes are accurate as of 18 10:16 AM.  If you have any questions, ask your nurse or doctor.               These medicines have changed or have updated prescriptions.        Dose/Directions    losartan 50 MG tablet   Commonly known as:  COZAAR   This may have changed:  Another medication with the same name was removed. Continue taking this medication, and follow the directions you see here.   Used for:  Essential hypertension, benign   Changed by:  Nabeel Allred MD        Dose:  50 mg   Take 1 tablet (50 mg) by mouth daily   Quantity:  90 tablet   Refills:  3            Where to get your medicines      Some of these will need a paper prescription and others can be bought over the counter.  Ask your " nurse if you have questions.     Bring a paper prescription for each of these medications     atorvastatin 20 MG tablet    losartan 50 MG tablet                Primary Care Provider Fax #    [facility] [phone]       [address] [facility] [address]        Equal Access to Services     FE CORDOBA : Hadii aad ku hadscotto Sodarielali, waaxda luqadaha, qaybta kaalmada adeegyada, joanne reneein hayaasue houseluanaskylar james. So [facility] [phone].    ATENCIÓN: Si habla español, tiene a kevin disposición servicios gratuitos de asistencia lingüística. Llame al [phone].    We comply with applicable federal civil rights laws and [place] laws. We do not discriminate on the basis of race, color, national origin, age, disability, sex, sexual orientation, or gender identity.            Thank you!     Thank you for choosing [facility]  for your care. Our goal is always to provide you with excellent care. Hearing back from our patients is one way we can continue to improve our services. Please take a few minutes to complete the written survey that you may receive in the mail after your visit with us. Thank you!             Your Updated Medication List - Protect others around you: Learn how to safely use, store and throw away your medicines at www.disposemymeds.org.          This list is accurate as of 7/25/18 10:16 AM.  Always use your most recent med list.                   Brand Name Dispense Instructions for use Diagnosis    ALPRAZolam 0.25 MG tablet    XANAX     Take 0.25 mg by mouth 2 times daily        amoxicillin 500 MG capsule    AMOXIL     Take 500 mg by mouth 3 times daily Before dental work        aspirin 81 MG tablet      Take 81 mg by mouth daily        atorvastatin 20 MG tablet    LIPITOR    90 tablet    Take 1 tablet (20 mg) by mouth daily    Coronary artery disease involving native coronary artery of native heart without angina pectoris        Calcium-Vitamin D3 250-125 MG-UNIT Tabs      Take 2 tablets by mouth 2 times daily        diclofenac 1 % Gel topical gel    VOLTAREN     Place onto the skin as needed for moderate pain        docusate sodium 100 MG capsule    COLACE     Take 100 mg by mouth 2 times daily        hydrocortisone 2.5 % cream      Apply topically 2 times daily        ibuprofen 200 MG capsule      Take 800 mg by mouth 2 times daily        lidocaine 5 % Patch    LIDODERM     Place 1 patch onto the skin        losartan 50 MG tablet    COZAAR    90 tablet    Take 1 tablet (50 mg) by mouth daily    Essential hypertension, benign       methocarbamol 500 MG tablet    ROBAXIN     Take 500 mg by mouth 3 times daily as needed        polyethylene glycol Packet    MIRALAX/GLYCOLAX     Take 1 packet by mouth as needed for constipation        primidone 50 MG tablet    MYSOLINE     Take 250 mg by mouth 2 times daily        propranolol HCl 60 MG Tabs      Take by mouth daily        psyllium 0.52 g capsule      Take 1 capsule by mouth daily        Urea 40 % Crea           Vitamin D (Cholecalciferol) 1000 units Caps      Take by mouth daily

## 2018-07-25 NOTE — PROGRESS NOTES
Service Date: 07/25/2018      HISTORY OF PRESENT ILLNESS:  Mr. 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  Tony has labile hypertension.      Tony returns to clinic stating he thinks he is doing quite well from a cardiac standpoint.  He has no chest, arm, neck, jaw or shoulder discomfort.  No dyspnea on exertion, orthopnea or PND.  No palpitations, lightheadedness, dizziness, syncope or near syncope.  He unfortunately does not exercise regularly, does not follow a very strict diet and has continued to gain weight.      He chronically complains about fatigue, tiredness and lack of energy.  He does have obstructive sleep apnea for which he does not use a CPAP mask.  We did stop his propranolol for a period of time.  This made no difference in his fatigue and his resting tremors obviously got worse.  Gabapentin was weaned off and he thinks his energy has improved somewhat with that intervention.      ASSESSMENT AND PLAN:  Tony appears to be doing well from a cardiac standpoint without clinical evidence of ischemia, heart failure or significant arrhythmia.  Last evaluation of his coronary anatomy was a stress nuclear scan in 2009 which appeared to be normal.      Blood pressure is well controlled today at 138/80 with a pulse of 62.      Weight unfortunately is up to 240 pounds giving a body mass index of 36.6.  I emphasized the importance of regular exercise regimen, tighter diet and weight loss.      Fasting lipid profile is outstanding.  Total cholesterol is 145, HDL is 46, LDL 77, triglycerides are 112.      Basic metabolic profile does show a bit of hyperkalemia, although he has been on the high side the past.  His  creatinine is 1 with a GFR of 72.  I suspect this may be hemolysis and I will just recheck his blood work in a week.  His bicarbonate is 30.  I have explained to them this most likely is just that he might be a bit dehydrated.  We will recheck his blood work next week.  We will check it in a nonfasting state.      [facility] and losartan.  I did give him some samples of Viagra today.  We will have him follow up in 1 year.  If he should have any problems, I would be glad to see him sooner.      Nabeel Roberto MD, FACC         NABEEL ROBERTO MD, FACC             D: 2018   T: 2018   MT: AURELIANO      Name:     SHANA PAYNE   MRN:      [id number]        Account:      [id number]   :      1940           Service Date: 2018      Document: [id number]

## 2018-07-25 NOTE — LETTER
7/25/2018      [facility]  [address]  [place] [address]      RE: Tony Bruce       Dear Colleague,    I had the pleasure of seeing Tony Bruce in the [facility].    Service Date: 07/25/2018      HISTORY OF PRESENT ILLNESS:  Mr. 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  Tony has labile hypertension.      Tony returns to clinic stating he thinks he is doing quite well from a cardiac standpoint.  He has no chest, arm, neck, jaw or shoulder discomfort.  No dyspnea on exertion, orthopnea or PND.  No palpitations, lightheadedness, dizziness, syncope or near syncope.  He unfortunately does not exercise regularly, does not follow a very strict diet and has continued to gain weight.      He chronically complains about fatigue, tiredness and lack of energy.  He does have obstructive sleep apnea for which he does not use a CPAP mask.  We did stop his propranolol for a period of time.  This made no difference in his fatigue and his resting tremors obviously got worse.  Gabapentin was weaned off and he thinks his energy has improved somewhat with that intervention.      ASSESSMENT AND PLAN:  Tony appears to be doing well from a cardiac standpoint without clinical evidence of ischemia, heart failure or significant arrhythmia.  Last evaluation of his coronary anatomy was a stress nuclear scan in 2009 which appeared to be normal.      Blood pressure is well controlled today at 138/80 with a pulse of 62.      Weight unfortunately is up to 240 pounds giving a body mass index of 36.6.  I emphasized the importance of regular exercise regimen, tighter diet and weight  loss.      Fasting lipid profile is outstanding.  Total cholesterol is 145, HDL is 46, LDL 77, triglycerides are 112.      Basic metabolic profile does show a bit of hyperkalemia, although he has been on the high side the past.  His creatinine is 1 with a GFR of 72.  I suspect this may be hemolysis and I will just recheck his blood work in a week.  His bicarbonate is 30.  I have explained to them this most likely is just that he might be a bit dehydrated.  We will recheck his blood work next week.  We will check it in a nonfasting state.      I did [facility] and losartan.  I did give him some samples of Viagra today.  We will have him follow up in 1 year.  If he should have any problems, I would be glad to see him sooner.      Nabeel Roberto MD, FACC         NABEEL ROBERTO MD, FACC             D: 2018   T: 2018   MT: AURELIANO      Name:     SHANA PAYNE   MRN:      [id number]        Account:      [id number]   :      1940           Service Date: 2018      Document: [id number]         Outpatient Encounter Prescriptions as of 2018   Medication Sig Dispense Refill     ALPRAZolam (XANAX) 0.25 MG tablet Take 0.25 mg by mouth 2 times daily       amoxicillin (AMOXIL) 500 MG capsule Take 500 mg by mouth 3 times daily Before dental work       aspirin 81 MG tablet Take 81 mg by mouth daily       atorvastatin (LIPITOR) 20 MG tablet Take 1 tablet (20 mg) by mouth daily 90 tablet 3     Calcium Carb-Cholecalciferol (CALCIUM-VITAMIN D3) 250-125 MG-UNIT TABS Take 2 tablets by mouth 2 times daily       diclofenac (VOLTAREN) 1 % GEL topical gel Place onto the skin as needed for moderate pain       docusate sodium (COLACE) 100 MG capsule Take 100 mg by mouth 2 times daily       hydrocortisone 2.5 % cream Apply topically 2 times daily       ibuprofen 200 MG capsule Take 800 mg by mouth 2 times daily       lidocaine (LIDODERM) 5 % Patch Place  1 patch onto the skin       losartan (COZAAR) 50 MG tablet Take 1 tablet (50 mg) by mouth daily 90 tablet 3     polyethylene glycol (MIRALAX/GLYCOLAX) Packet Take 1 packet by mouth as needed for constipation       primidone (MYSOLINE) 50 MG tablet Take 250 mg by mouth 2 times daily        propranolol HCl 60 MG TABS Take by mouth daily       psyllium 0.52 g capsule Take 1 capsule by mouth daily       Urea 40 % CREA        Vitamin D, Cholecalciferol, 1000 units CAPS Take by mouth daily       methocarbamol (ROBAXIN) 500 MG tablet Take 500 mg by mouth 3 times daily as needed        [DISCONTINUED] Aspirin (ASPIR-81 PO)        [DISCONTINUED] atorvastatin (LIPITOR) 20 MG tablet Take 1 tablet (20 mg) by mouth daily 90 tablet 3     [DISCONTINUED] losartan (COZAAR) 25 MG tablet Take 1 tablet (25 mg) by mouth daily 90 tablet 3     [DISCONTINUED] losartan (COZAAR) 50 MG tablet Take 50 mg by mouth daily       No facility-administered encounter medications on file as of 7/25/2018.        Again, thank you for allowing me to participate in the care of your patient.      Sincerely,    Nabeel Allred MD     [facility]

## 2018-07-26 ENCOUNTER — DOCUMENTATION ONLY (OUTPATIENT)
Dept: CARDIOLOGY | Facility: CLINIC | Age: 78
End: 2018-07-26

## 2018-07-26 LAB — DEPRECATED CALCIDIOL+CALCIFEROL SERPL-MC: 23 UG/L (ref 20–75)

## 2018-07-26 NOTE — LETTER
July 26, 2018       TO: Tony Bruce   [address] [facility] [address]       Dear Mr. Bruce,    The results of your recent labs are below.     Results for orders placed or performed in visit on 07/25/18   Basic metabolic panel   Result Value Ref Range    Sodium 141 136 - 145 mmol/L    Potassium 5.3 (H) 3.5 - 5.1 mmol/L    Chloride 104 98 - 107 mmol/L    Carbon Dioxide 30 (H) 23 - 29 mmol/L    Anion Gap 12.3 6 - 17 mmol/L    Glucose 101 70 - 105 mg/dL    Urea Nitrogen 17 7 - 30 mg/dL    Creatinine 1.00 0.70 - 1.30 mg/dL    GFR Estimate 72 >60 mL/min/1.7m2    GFR Estimate If Black 87 >60 mL/min/1.7m2    Calcium 9.0 8.5 - 10.5 mg/dL   Lipid Profile   Result Value Ref Range    Cholesterol 145 <200 mg/dL    Triglycerides 112 <150 mg/dL    HDL Cholesterol 46 >39 mg/dL    LDL Cholesterol Calculated 77 <100 mg/dL    Non HDL Cholesterol 99 <130 mg/dL   Prostate spec antigen screen   Result Value Ref Range    PSA 4.87 (H) 0 - 4 ug/L   Vitamin D Deficiency Screening   Result Value Ref Range    Vitamin D Deficiency screening 23 20 - 75 ug/L   Sincerely,  [facility]

## 2018-08-01 ENCOUNTER — TELEPHONE (OUTPATIENT)
Dept: CARDIOLOGY | Facility: CLINIC | Age: 78
End: 2018-08-01

## 2018-08-01 DIAGNOSIS — I10 ESSENTIAL HYPERTENSION, BENIGN: ICD-10-CM

## 2018-08-01 LAB
ANION GAP SERPL CALCULATED.3IONS-SCNC: 7.3 MMOL/L (ref 6–17)
BUN SERPL-MCNC: 16 MG/DL (ref 7–30)
CALCIUM SERPL-MCNC: 9.3 MG/DL (ref 8.5–10.5)
CHLORIDE SERPL-SCNC: 104 MMOL/L (ref 98–107)
CO2 SERPL-SCNC: 31 MMOL/L (ref 23–29)
CREAT SERPL-MCNC: 1.06 MG/DL (ref 0.7–1.3)
GFR SERPL CREATININE-BSD FRML MDRD: 67 ML/MIN/1.7M2
GLUCOSE SERPL-MCNC: 92 MG/DL (ref 70–105)
POTASSIUM SERPL-SCNC: 4.3 MMOL/L (ref 3.5–5.1)
SODIUM SERPL-SCNC: 138 MMOL/L (ref 136–145)

## 2018-08-01 PROCEDURE — 80048 BASIC METABOLIC PNL TOTAL CA: CPT | Performed by: INTERNAL MEDICINE

## 2018-08-01 PROCEDURE — 36415 COLL VENOUS BLD VENIPUNCTURE: CPT | Performed by: INTERNAL MEDICINE

## 2018-08-01 NOTE — TELEPHONE ENCOUNTER
BMP done today,  8-1-18,   1 week potassium recheck for mild hyperkalemia (suspect hemolysis) .  7-25-18  K+ was 5.3,  Today K+ 4.3  Patient to call back for results.   Dr. Brigida nunez.

## 2018-08-01 NOTE — TELEPHONE ENCOUNTER
Spoke with patient to review today's bmp showing K+ level WNL. Will contact patient again if any new directions from Dr. Allred.

## 2019-01-02 LAB — TSH SERPL-ACNC: 3.98 MCU/ML

## 2019-07-22 ENCOUNTER — PRE VISIT (OUTPATIENT)
Dept: CARDIOLOGY | Facility: CLINIC | Age: 79
End: 2019-07-22

## 2019-07-22 NOTE — TELEPHONE ENCOUNTER
Chart Update for OV with Dr. Allred scheduled for 7/25/19.   KITA Damon RN, BSN.   07/22/19 4:22 PM

## 2019-07-25 ENCOUNTER — DOCUMENTATION ONLY (OUTPATIENT)
Dept: CARDIOLOGY | Facility: CLINIC | Age: 79
End: 2019-07-25

## 2019-07-25 ENCOUNTER — OFFICE VISIT (OUTPATIENT)
Dept: CARDIOLOGY | Facility: CLINIC | Age: 79
End: 2019-07-25
Attending: INTERNAL MEDICINE
Payer: COMMERCIAL

## 2019-07-25 VITALS
WEIGHT: 232.1 LBS | HEART RATE: 60 BPM | BODY MASS INDEX: 35.18 KG/M2 | HEIGHT: 68 IN | DIASTOLIC BLOOD PRESSURE: 80 MMHG | SYSTOLIC BLOOD PRESSURE: 140 MMHG

## 2019-07-25 DIAGNOSIS — E78.00 PURE HYPERCHOLESTEROLEMIA: Primary | Chronic | ICD-10-CM

## 2019-07-25 DIAGNOSIS — I10 ESSENTIAL HYPERTENSION, BENIGN: ICD-10-CM

## 2019-07-25 DIAGNOSIS — I25.10 CORONARY ARTERY DISEASE INVOLVING NATIVE CORONARY ARTERY OF NATIVE HEART WITHOUT ANGINA PECTORIS: ICD-10-CM

## 2019-07-25 DIAGNOSIS — E66.09 NON MORBID OBESITY DUE TO EXCESS CALORIES: ICD-10-CM

## 2019-07-25 LAB
ANION GAP SERPL CALCULATED.3IONS-SCNC: 3 MMOL/L (ref 3–14)
BUN SERPL-MCNC: 20 MG/DL (ref 7–30)
CALCIUM SERPL-MCNC: 8.3 MG/DL (ref 8.5–10.1)
CHLORIDE SERPL-SCNC: 107 MMOL/L (ref 94–109)
CHOLEST SERPL-MCNC: 129 MG/DL
CO2 SERPL-SCNC: 30 MMOL/L (ref 20–32)
CREAT SERPL-MCNC: 0.82 MG/DL (ref 0.66–1.25)
GFR SERPL CREATININE-BSD FRML MDRD: 83 ML/MIN/{1.73_M2}
GLUCOSE SERPL-MCNC: 96 MG/DL (ref 70–99)
HDLC SERPL-MCNC: 65 MG/DL
LDLC SERPL CALC-MCNC: 50 MG/DL
NONHDLC SERPL-MCNC: 64 MG/DL
POTASSIUM SERPL-SCNC: 4 MMOL/L (ref 3.4–5.3)
SODIUM SERPL-SCNC: 140 MMOL/L (ref 133–144)
TRIGL SERPL-MCNC: 70 MG/DL

## 2019-07-25 PROCEDURE — 80061 LIPID PANEL: CPT | Performed by: INTERNAL MEDICINE

## 2019-07-25 PROCEDURE — 80048 BASIC METABOLIC PNL TOTAL CA: CPT | Performed by: INTERNAL MEDICINE

## 2019-07-25 PROCEDURE — 99214 OFFICE O/P EST MOD 30 MIN: CPT | Performed by: INTERNAL MEDICINE

## 2019-07-25 PROCEDURE — 36415 COLL VENOUS BLD VENIPUNCTURE: CPT | Performed by: INTERNAL MEDICINE

## 2019-07-25 RX ORDER — UREA 40 %
CREAM (GRAM) TOPICAL
COMMUNITY
End: 2019-07-25

## 2019-07-25 RX ORDER — LOSARTAN POTASSIUM 50 MG/1
50 TABLET ORAL DAILY
Qty: 90 TABLET | Refills: 3 | Status: SHIPPED | OUTPATIENT
Start: 2019-07-25 | End: 2020-09-09

## 2019-07-25 RX ORDER — ATORVASTATIN CALCIUM 20 MG/1
20 TABLET, FILM COATED ORAL DAILY
Qty: 90 TABLET | Refills: 3 | Status: SHIPPED | OUTPATIENT
Start: 2019-07-25 | End: 2020-09-09

## 2019-07-25 RX ORDER — VALACYCLOVIR HYDROCHLORIDE 1 G/1
TABLET, FILM COATED ORAL
COMMUNITY
End: 2024-05-01

## 2019-07-25 RX ORDER — ALPRAZOLAM 0.25 MG
TABLET ORAL
COMMUNITY
End: 2019-07-25

## 2019-07-25 RX ORDER — TRAMADOL HYDROCHLORIDE 50 MG/1
TABLET ORAL
COMMUNITY
End: 2024-05-01

## 2019-07-25 ASSESSMENT — MIFFLIN-ST. JEOR: SCORE: 1732.3

## 2019-07-25 NOTE — LETTER
7/25/2019      [facility]  [address]  [facility] [address]      RE: Tony Bruce       Dear Colleague,    I had the pleasure of seeing Tony Bruce in the [facility].    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  Tony also has labile hypertension.      Tony returns to clinic stating he thinks he is doing quite well from a cardiac standpoint.  He has no chest, arm, neck, jaw or shoulder discomfort.  No dyspnea on exertion, orthopnea or PND.  No palpitations, lightheadedness, dizziness, syncope or near-syncope.  He unfortunately does not exercise regularly.  He does not follow a very strict diet, although his weight is down a bit from last year at 232 pounds, giving a body mass index of 35.3.      He states his 83[facility] retired and he got a new younger doctor that started changing his medications around, initially stopping his losartan, although blood pressure went up quite a bit.  [place] losartan at 50 mg daily.  He brings in blood pressures from home that all look well controlled in the 120-130 range.      ASSESSMENT AND PLAN:  Tony appears to be doing well from a cardiac standpoint without clinical evidence of ischemia.  Last evaluation of his coronary anatomy was a stress nuclear scan in 2009 that appeared to be normal.      He has no symptoms to suggest congestive heart failure or significant arrhythmia.        Blood pressure today is well controlled with a blood pressure of 140/80, although as stated all blood  pressures from home are in the 120-130 range.  I am not going to change his medical regimen, although if he were to have significant hypertension, I would bump his losartan from 50 back up to 100 mg daily.  It is unclear why this was discontinued.  He states it was because of interaction with his ibuprofen, which he takes for his back.  He does have one BMP with a potassium of 5.3 but all other potassiums in normal range, creatinine is in normal range, so it is unclear why the losartan was discontinued.      We reviewed his medications.  I will continue all of the remainder of his medications as is.      Fasting lipid profile is outstanding.  Total cholesterol 129, HDL 65, LDL 50, triglycerides are 70.      We talked about the importance of getting some sort of exercise.  Given his limitations of his back, we discussed the possibility of using a stationary bike, that he needs to try to do something to get his weight down and increase his physical conditioning.      I will have him follow up in 1 year.  If he should have any problems, I would be glad to see her sooner.      Thank you for allowing me to participate in his care.      Nabeel Roberto MD, FACC         NABEEL ROBERTO MD, FACC             D: 2019   T: 2019   MT: DIDIER      Name:     SHANA PAYNE   MRN:      [id number]        Account:      [id number]   :      1940           Service Date: 2019      Document: [id number]         Outpatient Encounter Medications as of 2019   Medication Sig Dispense Refill     ALPRAZolam (XANAX) 0.25 MG tablet Take 0.25 mg by mouth 2 times daily       amoxicillin (AMOXIL) 500 MG capsule Take 500 mg by mouth 3 times daily Before dental work       aspirin 81 MG tablet Take 81 mg by mouth daily       Calcium Carb-Cholecalciferol (CALCIUM-VITAMIN D3) 250-125 MG-UNIT TABS Take 2 tablets by mouth 2 times daily       diclofenac (VOLTAREN) 1 % GEL topical gel Place onto the skin as needed for  moderate pain       docusate sodium (COLACE) 100 MG capsule Take 100 mg by mouth 2 times daily       Emollient (VANICREAM EX) APPLY THIN LAYER    TWICE A DAY FOR DRY SKIN       hydrocortisone 2.5 % cream Apply topically 2 times daily       ibuprofen 200 MG capsule Take 800 mg by mouth 2 times daily       lidocaine (LIDODERM) 5 % Patch Place 1 patch onto the skin       methocarbamol (ROBAXIN) 500 MG tablet Take 500 mg by mouth 3 times daily as needed        polyethylene glycol (MIRALAX/GLYCOLAX) Packet Take 1 packet by mouth as needed for constipation       primidone (MYSOLINE) 50 MG tablet Take 250 mg by mouth 2 times daily        propranolol HCl 60 MG TABS Take by mouth daily       psyllium 0.52 g capsule Take 1 capsule by mouth daily       traMADol (ULTRAM) 50 MG tablet TAKE ONE TABLET BY MOUTH TWICE A DAY AS NEEDED FOR LOW BACK PAIN       Urea 40 % CREA        valACYclovir (VALTREX) 1000 mg tablet TAKE TWO TABLETS BY MOUTH TWICE A DAY       Vitamin D, Cholecalciferol, 1000 units CAPS Take by mouth daily       [DISCONTINUED] atorvastatin (LIPITOR) 20 MG tablet Take 1 tablet (20 mg) by mouth daily 90 tablet 3     [DISCONTINUED] losartan (COZAAR) 50 MG tablet Take 1 tablet (50 mg) by mouth daily 90 tablet 3     [DISCONTINUED] ALPRAZolam (XANAX) 0.25 MG tablet TAKE ONE TABLET BY MOUTH TWICE A DAY       [DISCONTINUED] Urea 40 % CREA APPLY THIN LAYER    EVERY DAY FOR THICK,DRY SKIN       No facility-administered encounter medications on file as of 7/25/2019.        Again, thank you for allowing me to participate in the care of your patient.      Sincerely,    Nabeel Allred MD     [facility]

## 2019-07-25 NOTE — PROGRESS NOTES
Received refill request for:  Atorvastatin and losartan  Last OV was: 2019 with Dr. Allred  Labs/EK/25 - BMP and lipids   F/U scheduled: orders in Epic for 2020  New script sent to: [facility]  **Copy of todays OV note, lab results and scripts faxed to [facility]

## 2019-07-25 NOTE — PROGRESS NOTES
HPI and Plan:   See dictation    Orders Placed This Encounter   Procedures     Basic metabolic panel     Lipid Profile     Follow-Up with Cardiologist       Orders Placed This Encounter   Medications     DISCONTD: ALPRAZolam (XANAX) 0.25 MG tablet     Sig: TAKE ONE TABLET BY MOUTH TWICE A DAY     traMADol (ULTRAM) 50 MG tablet     Sig: TAKE ONE TABLET BY MOUTH TWICE A DAY AS NEEDED FOR LOW BACK PAIN     valACYclovir (VALTREX) 1000 mg tablet     Sig: TAKE TWO TABLETS BY MOUTH TWICE A DAY     DISCONTD: Urea 40 % CREA     Sig: APPLY THIN LAYER    EVERY DAY FOR THICK,DRY SKIN     Emollient (VANICREAM EX)     Sig: APPLY THIN LAYER    TWICE A DAY FOR DRY SKIN       Medications Discontinued During This Encounter   Medication Reason     ALPRAZolam (XANAX) 0.25 MG tablet Medication Reconciliation Clean Up     Urea 40 % CREA Medication Reconciliation Clean Up         Encounter Diagnoses   Name Primary?     Coronary artery disease involving native coronary artery of native heart without angina pectoris      Pure hypercholesterolemia Yes     Essential hypertension, benign      Non morbid obesity due to excess calories        CURRENT MEDICATIONS:  Current Outpatient Medications   Medication Sig Dispense Refill     ALPRAZolam (XANAX) 0.25 MG tablet Take 0.25 mg by mouth 2 times daily       amoxicillin (AMOXIL) 500 MG capsule Take 500 mg by mouth 3 times daily Before dental work       aspirin 81 MG tablet Take 81 mg by mouth daily       atorvastatin (LIPITOR) 20 MG tablet Take 1 tablet (20 mg) by mouth daily 90 tablet 3     Calcium Carb-Cholecalciferol (CALCIUM-VITAMIN D3) 250-125 MG-UNIT TABS Take 2 tablets by mouth 2 times daily       diclofenac (VOLTAREN) 1 % GEL topical gel Place onto the skin as needed for moderate pain       docusate sodium (COLACE) 100 MG capsule Take 100 mg by mouth 2 times daily       Emollient (VANICREAM EX) APPLY THIN LAYER    TWICE A DAY FOR DRY SKIN       hydrocortisone 2.5 % cream Apply topically 2  times daily       ibuprofen 200 MG capsule Take 800 mg by mouth 2 times daily       lidocaine (LIDODERM) 5 % Patch Place 1 patch onto the skin       losartan (COZAAR) 50 MG tablet Take 1 tablet (50 mg) by mouth daily 90 tablet 3     methocarbamol (ROBAXIN) 500 MG tablet Take 500 mg by mouth 3 times daily as needed        polyethylene glycol (MIRALAX/GLYCOLAX) Packet Take 1 packet by mouth as needed for constipation       primidone (MYSOLINE) 50 MG tablet Take 250 mg by mouth 2 times daily        propranolol HCl 60 MG TABS Take by mouth daily       psyllium 0.52 g capsule Take 1 capsule by mouth daily       traMADol (ULTRAM) 50 MG tablet TAKE ONE TABLET BY MOUTH TWICE A DAY AS NEEDED FOR LOW BACK PAIN       Urea 40 % CREA        valACYclovir (VALTREX) 1000 mg tablet TAKE TWO TABLETS BY MOUTH TWICE A DAY       Vitamin D, Cholecalciferol, 1000 units CAPS Take by mouth daily         ALLERGIES     Allergies   Allergen Reactions     Lisinopril      Morphine      confusion       PAST MEDICAL HISTORY:  Past Medical History:   Diagnosis Date     Coronary atherosclerosis of unspecified type of vessel, native or graft 8/01    cardiac cath 2005: GER to LAD; cath 2002: GER to RCA     Essential hypertension, benign     INTERMITTENT HYPERTENSION     Generalized osteoarthrosis, unspecified site      Hyperlipidaemia      Hypertrophy of prostate without urinary obstruction and other lower urinary tract symptoms (LUTS)     [facility] - Dr Pinto     Impotence of organic origin     Dr Pinto - Dr Allred     NONSPECIFIC MEDICAL HISTORY     CERVICAL/LUMBAR RADICULOPATHY     NONSPECIFIC MEDICAL HISTORY     SHOULDER IMPINGEMENT     NSTEMI (non-ST elevated myocardial infarction) (H)     inferior 2001     Obese      Other and unspecified hyperlipidemia     ?       PAST SURGICAL HISTORY:  Past Surgical History:   Procedure Laterality Date     C TOTAL KNEE ARTHROPLASTY  7/04    Knee Replacement, Total - LEFT Dr Regan     CARDIAC NUC DANISHA STRESS  TEST NL  4/09    normal     COLONOSCOPY  6/06    normal - diverticulosis - dr adams     COLONOSCOPY  8/14/2012    Procedure: COLONOSCOPY;  COLONOSCOPY SCREENING/ 6 YEAR FOLLOW UP;  Surgeon: Rey Adams MD;  Location: [facility]     HC REPAIR UMBILICAL CHIKA,5+Y/O,REDUC  9/04    dr dominique     SURGICAL HISTORY OF -   1990    S/P CERVICAL DISCECTOMY x 2     SURGICAL HISTORY OF -   1990    S/P LUMBAR DISCECTOMY x 2     SURGICAL HISTORY OF -   1993    S/P LT CARPAL TUNNEL SURG/SHOULDER IMPINGEMENT     SURGICAL HISTORY OF -   8/01    S/P STENT OF RT CORONARY ARTERY     SURGICAL HISTORY OF -   9/05    Drug eluting stent to LAD       FAMILY HISTORY:  Family History   Problem Relation Age of Onset     Respiratory Father         COPD     Cerebrovascular Disease Mother         CVA     C.A.D. Brother         CABG - after CABG x 3 - rheumatic fever as a child     Breast Cancer Sister      Cerebrovascular Disease Sister        SOCIAL HISTORY:  Social History     Socioeconomic History     Marital status:      Spouse name: paola     Number of children: 4     Years of education: 14     Highest education level: None   Occupational History     None   Social Needs     Financial resource strain: None     Food insecurity:     Worry: None     Inability: None     Transportation needs:     Medical: None     Non-medical: None   Tobacco Use     Smoking status: Never Smoker     Smokeless tobacco: Never Used   Substance and Sexual Activity     Alcohol use: No     Drug use: No     Sexual activity: Yes     Partners: Female   Lifestyle     Physical activity:     Days per week: None     Minutes per session: None     Stress: None   Relationships     Social connections:     Talks on phone: None     Gets together: None     Attends [employer] service: None     Active member of club or organization: None     Attends meetings of clubs or organizations: None     Relationship status: None     Intimate partner violence:     Fear of current or  "ex partner: None     Emotionally abused: None     Physically abused: None     Forced sexual activity: None   Other Topics Concern      Service Not Asked     Blood Transfusions Not Asked     Caffeine Concern Yes     Comment: 3-4 cups qd     Occupational Exposure Not Asked     Hobby Hazards Not Asked     Sleep Concern Not Asked     Stress Concern Not Asked     Weight Concern Not Asked     Special Diet Not Asked     Back Care Not Asked     Exercise Yes     Comment: limited     Bike Helmet Not Asked     Seat Belt Yes     Self-Exams Not Asked     Parent/sibling w/ CABG, MI or angioplasty before 65F 55M? Not Asked   Social History Narrative     None       Review of Systems:  Skin:  Negative       Eyes:  Positive for glasses    ENT:  Negative      Respiratory:  Negative       Cardiovascular:  Negative      Gastroenterology: Negative      Genitourinary:  Negative      Musculoskeletal:  Positive for arthritis;joint pain;neck pain;back pain    Neurologic:  Positive for numbness or tingling of hands    Psychiatric:  Negative      Heme/Lymph/Imm:  Positive for allergies    Endocrine:  Negative        Physical Exam:  Vitals: /80 (BP Location: Right arm, Patient Position: Sitting, Cuff Size: Adult Regular)   Pulse 60   Ht 1.727 m (5' 8\")   Wt 105.3 kg (232 lb 1.6 oz)   BMI 35.29 kg/m      Constitutional:  cooperative, alert and oriented, well developed, well nourished, in no acute distress obese      Skin:  warm and dry to the touch, no apparent skin lesions or masses noted          Head:  normocephalic, no masses or lesions        Eyes:  pupils equal and round, conjunctivae and lids unremarkable, sclera white, no xanthalasma, EOMS intact, no nystagmus        Lymph:      ENT:  no pallor or cyanosis, dentition good        Neck:  carotid pulses are full and equal bilaterally;no carotid bruit        Respiratory:  normal breath sounds, clear to auscultation, normal A-P diameter, normal symmetry, normal respiratory " excursion, no use of accessory muscles         Cardiac: regular rhythm;normal S1 and S2;no S3 or S4       systolic murmur;RUSB;grade 2        pulses full and equal                                        GI:    obese      Extremities and Muscular Skeletal:  no edema;no spinal abnormalities noted;normal muscle strength and tone              Neurological:  no gross motor deficits        Psych:  affect appropriate, oriented to time, person and place        CC  Nabeel Allred MD  [address]  [place] [id number]

## 2019-07-25 NOTE — LETTER
7/25/2019    [facility]  [facility] [address]    RE: Tony Bruce       Dear Colleague,    I had the pleasure of seeing Tony Bruce in the [facility].    HPI and Plan:   See dictation    Orders Placed This Encounter   Procedures     Basic metabolic panel     Lipid Profile     Follow-Up with Cardiologist       Orders Placed This Encounter   Medications     DISCONTD: ALPRAZolam (XANAX) 0.25 MG tablet     Sig: TAKE ONE TABLET BY MOUTH TWICE A DAY     traMADol (ULTRAM) 50 MG tablet     Sig: TAKE ONE TABLET BY MOUTH TWICE A DAY AS NEEDED FOR LOW BACK PAIN     valACYclovir (VALTREX) 1000 mg tablet     Sig: TAKE TWO TABLETS BY MOUTH TWICE A DAY     DISCONTD: Urea 40 % CREA     Sig: APPLY THIN LAYER    EVERY DAY FOR THICK,DRY SKIN     Emollient (VANICREAM EX)     Sig: APPLY THIN LAYER    TWICE A DAY FOR DRY SKIN       Medications Discontinued During This Encounter   Medication Reason     ALPRAZolam (XANAX) 0.25 MG tablet Medication Reconciliation Clean Up     Urea 40 % CREA Medication Reconciliation Clean Up         Encounter Diagnoses   Name Primary?     Coronary artery disease involving native coronary artery of native heart without angina pectoris      Pure hypercholesterolemia Yes     Essential hypertension, benign      Non morbid obesity due to excess calories        CURRENT MEDICATIONS:  Current Outpatient Medications   Medication Sig Dispense Refill     ALPRAZolam (XANAX) 0.25 MG tablet Take 0.25 mg by mouth 2 times daily       amoxicillin (AMOXIL) 500 MG capsule Take 500 mg by mouth 3 times daily Before dental work       aspirin 81 MG tablet Take 81 mg by mouth daily       atorvastatin (LIPITOR) 20 MG tablet Take 1 tablet (20 mg) by mouth daily 90 tablet 3     Calcium Carb-Cholecalciferol (CALCIUM-VITAMIN D3) 250-125 MG-UNIT TABS Take 2 tablets by mouth 2 times daily       diclofenac (VOLTAREN) 1 % GEL topical gel Place onto the skin as needed  for moderate pain       docusate sodium (COLACE) 100 MG capsule Take 100 mg by mouth 2 times daily       Emollient (VANICREAM EX) APPLY THIN LAYER    TWICE A DAY FOR DRY SKIN       hydrocortisone 2.5 % cream Apply topically 2 times daily       ibuprofen 200 MG capsule Take 800 mg by mouth 2 times daily       lidocaine (LIDODERM) 5 % Patch Place 1 patch onto the skin       losartan (COZAAR) 50 MG tablet Take 1 tablet (50 mg) by mouth daily 90 tablet 3     methocarbamol (ROBAXIN) 500 MG tablet Take 500 mg by mouth 3 times daily as needed        polyethylene glycol (MIRALAX/GLYCOLAX) Packet Take 1 packet by mouth as needed for constipation       primidone (MYSOLINE) 50 MG tablet Take 250 mg by mouth 2 times daily        propranolol HCl 60 MG TABS Take by mouth daily       psyllium 0.52 g capsule Take 1 capsule by mouth daily       traMADol (ULTRAM) 50 MG tablet TAKE ONE TABLET BY MOUTH TWICE A DAY AS NEEDED FOR LOW BACK PAIN       Urea 40 % CREA        valACYclovir (VALTREX) 1000 mg tablet TAKE TWO TABLETS BY MOUTH TWICE A DAY       Vitamin D, Cholecalciferol, 1000 units CAPS Take by mouth daily         ALLERGIES     Allergies   Allergen Reactions     Lisinopril      Morphine      confusion       PAST MEDICAL HISTORY:  Past Medical History:   Diagnosis Date     Coronary atherosclerosis of unspecified type of vessel, native or graft 8/01    cardiac cath 2005: GER to LAD; cath 2002: GER to RCA     Essential hypertension, benign     INTERMITTENT HYPERTENSION     Generalized osteoarthrosis, unspecified site      Hyperlipidaemia      Hypertrophy of prostate without urinary obstruction and other lower urinary tract symptoms (LUTS)     BPH - Dr Pinto     Impotence of organic origin     Dr Pinto - Dr Allred     NONSPECIFIC MEDICAL HISTORY     CERVICAL/LUMBAR RADICULOPATHY     NONSPECIFIC MEDICAL HISTORY     SHOULDER IMPINGEMENT     NSTEMI (non-ST elevated myocardial infarction) (H)     inferior 2001     Obese      Other  and unspecified hyperlipidemia     ?       PAST SURGICAL HISTORY:  Past Surgical History:   Procedure Laterality Date     C TOTAL KNEE ARTHROPLASTY  7/04    Knee Replacement, Total - LEFT Dr Regan     CARDIAC NUC DANISHA STRESS TEST NL  4/09    normal     COLONOSCOPY  6/06    normal - diverticulosis - dr adams     COLONOSCOPY  8/14/2012    Procedure: COLONOSCOPY;  COLONOSCOPY SCREENING/ 6 YEAR FOLLOW UP;  Surgeon: Rey Adams MD;  Location: [facility]     HC REPAIR UMBILICAL CHIKA,5+Y/O,REDUC  9/04    dr dominique     SURGICAL HISTORY OF -   1990    S/P CERVICAL DISCECTOMY x 2     SURGICAL HISTORY OF -   1990    S/P LUMBAR DISCECTOMY x 2     SURGICAL HISTORY OF -   1993    S/P LT CARPAL TUNNEL SURG/SHOULDER IMPINGEMENT     SURGICAL HISTORY OF -   8/01    S/P STENT OF RT CORONARY ARTERY     SURGICAL HISTORY OF - 9/05    Drug eluting stent to LAD       FAMILY HISTORY:  Family History   Problem Relation Age of Onset     Respiratory Father         COPD     Cerebrovascular Disease Mother         CVA     C.A.D. Brother         CABG - after CABG x 3 - rheumatic fever as a child     Breast Cancer Sister      Cerebrovascular Disease Sister        SOCIAL HISTORY:  Social History     Socioeconomic History     Marital status:      Spouse name: paola     Number of children: 4     Years of education: 14     Highest education level: None   Occupational History     None   Social Needs     Financial resource strain: None     Food insecurity:     Worry: None     Inability: None     Transportation needs:     Medical: None     Non-medical: None   Tobacco Use     Smoking status: Never Smoker     Smokeless tobacco: Never Used   Substance and Sexual Activity     Alcohol use: No     Drug use: No     Sexual activity: Yes     Partners: Female   Lifestyle     Physical activity:     Days per week: None     Minutes per session: None     Stress: None   Relationships     Social connections:     Talks on phone: None     Gets together:  "None     Attends Protestant service: None     Active member of club or organization: None     Attends meetings of clubs or organizations: None     Relationship status: None     Intimate partner violence:     Fear of current or ex partner: None     Emotionally abused: None     Physically abused: None     Forced sexual activity: None   Other Topics Concern      Service Not Asked     Blood Transfusions Not Asked     Caffeine Concern Yes     Comment: 3-4 cups qd     Occupational Exposure Not Asked     Hobby Hazards Not Asked     Sleep Concern Not Asked     Stress Concern Not Asked     Weight Concern Not Asked     Special Diet Not Asked     Back Care Not Asked     Exercise Yes     Comment: limited     Bike Helmet Not Asked     Seat Belt Yes     Self-Exams Not Asked     Parent/sibling w/ CABG, MI or angioplasty before 65F 55M? Not Asked   Social History Narrative     None       Review of Systems:  Skin:  Negative       Eyes:  Positive for glasses    ENT:  Negative      Respiratory:  Negative       Cardiovascular:  Negative      Gastroenterology: Negative      Genitourinary:  Negative      Musculoskeletal:  Positive for arthritis;joint pain;neck pain;back pain    Neurologic:  Positive for numbness or tingling of hands    Psychiatric:  Negative      Heme/Lymph/Imm:  Positive for allergies    Endocrine:  Negative        Physical Exam:  Vitals: /80 (BP Location: Right arm, Patient Position: Sitting, Cuff Size: Adult Regular)   Pulse 60   Ht 1.727 m (5' 8\")   Wt 105.3 kg (232 lb 1.6 oz)   BMI 35.29 kg/m       Constitutional:  cooperative, alert and oriented, well developed, well nourished, in no acute distress obese      Skin:  warm and dry to the touch, no apparent skin lesions or masses noted          Head:  normocephalic, no masses or lesions        Eyes:  pupils equal and round, conjunctivae and lids unremarkable, sclera white, no xanthalasma, EOMS intact, no nystagmus        Lymph:      ENT:  no pallor or " cyanosis, dentition good        Neck:  carotid pulses are full and equal bilaterally;no carotid bruit        Respiratory:  normal breath sounds, clear to auscultation, normal A-P diameter, normal symmetry, normal respiratory excursion, no use of accessory muscles         Cardiac: regular rhythm;normal S1 and S2;no S3 or S4       systolic murmur;RUSB;grade 2        pulses full and equal                                        GI:    obese      Extremities and Muscular Skeletal:  no edema;no spinal abnormalities noted;normal muscle strength and tone              Neurological:  no gross motor deficits        Psych:  affect appropriate, oriented to time, person and place        CC  Nabeel Allred MD  [address]  [place] [address]                Thank you for allowing me to participate in the care of your patient.      Sincerely,     Nabeel Allred MD     [facility]    cc:   Nabeel Allred MD  [address]  [place] [address]

## 2019-07-25 NOTE — PROGRESS NOTES
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  Tony also has labile hypertension.      Tony returns to clinic stating he thinks he is doing quite well from a cardiac standpoint.  He has no chest, arm, neck, jaw or shoulder discomfort.  No dyspnea on exertion, orthopnea or PND.  No palpitations, lightheadedness, dizziness, syncope or near-syncope.  He unfortunately does not exercise regularly.  He does not follow a very strict diet, although his weight is down a bit from last year at 232 pounds, giving a body mass index of 35.3.      He states [facility] 83[facility] and he got a new younger doctor that started changing his medications around, initially stopping his losartan, although blood pressure went up quite a bit.  [place] his losartan at 50 mg daily.  He brings in blood pressures from home that all look well controlled in the 120-130 range.      ASSESSMENT AND PLAN:  Tony appears to be doing well from a cardiac standpoint without clinical evidence of ischemia.  Last evaluation of his coronary anatomy was a stress nuclear scan in 2009 that appeared to be normal.      He has no symptoms to suggest congestive heart failure or significant arrhythmia.      Blood pressure today is well controlled with a blood pressure of 140/80, although as stated all blood pressures from home are in the 120-130 range.  I am not going to change his medical regimen, although if he were to have significant hypertension, I would bump his losartan from 50 back up to 100 mg daily.  It is unclear why this was  discontinued.  He states it was because of interaction with his ibuprofen, which he takes for his back.  He does have one BMP with a potassium of 5.3 but all other potassiums in normal range, creatinine is in normal range, so it is unclear why the losartan was discontinued.      We reviewed his medications.  I will continue all of the remainder of his medications as is.      Fasting lipid profile is outstanding.  Total cholesterol 129, HDL 65, LDL 50, triglycerides are 70.      We talked about the importance of getting some sort of exercise.  Given his limitations of his back, we discussed the possibility of using a stationary bike, that he needs to try to do something to get his weight down and increase his physical conditioning.      I will have him follow up in 1 year.  If he should have any problems, I would be glad to see her sooner.      Thank you for allowing me to participate in his care.      Nabeel Roberto MD, FACC         NABEEL ROBERTO MD, FACC             D: 2019   T: 2019   MT: DIDIER      Name:     SHANA PAYNE   MRN:      [id number]        Account:      [id number]   :      1940           Service Date: 2019      Document: [id number]

## 2019-07-31 ENCOUNTER — HOSPITAL ENCOUNTER (EMERGENCY)
Facility: CLINIC | Age: 79
Discharge: HOME | End: 2019-07-31
Admitting: PHYSICIAN ASSISTANT
Payer: COMMERCIAL

## 2019-07-31 VITALS
OXYGEN SATURATION: 96 % | WEIGHT: 224.87 LBS | RESPIRATION RATE: 18 BRPM | DIASTOLIC BLOOD PRESSURE: 81 MMHG | SYSTOLIC BLOOD PRESSURE: 167 MMHG | HEART RATE: 60 BPM | BODY MASS INDEX: 34.19 KG/M2 | TEMPERATURE: 98 F

## 2019-07-31 DIAGNOSIS — I10 BENIGN ESSENTIAL HYPERTENSION: ICD-10-CM

## 2019-07-31 LAB
ANION GAP SERPL CALCULATED.3IONS-SCNC: 6 MMOL/L (ref 3–14)
BUN SERPL-MCNC: 14 MG/DL (ref 7–30)
CALCIUM SERPL-MCNC: 8.7 MG/DL (ref 8.5–10.1)
CHLORIDE SERPL-SCNC: 106 MMOL/L (ref 94–109)
CO2 SERPL-SCNC: 28 MMOL/L (ref 20–32)
CREAT SERPL-MCNC: 0.87 MG/DL (ref 0.66–1.25)
GFR SERPL CREATININE-BSD FRML MDRD: 81 ML/MIN/{1.73_M2}
GLUCOSE SERPL-MCNC: 95 MG/DL (ref 70–99)
POTASSIUM SERPL-SCNC: 4.1 MMOL/L (ref 3.4–5.3)
SODIUM SERPL-SCNC: 140 MMOL/L (ref 133–144)
TROPONIN I SERPL-MCNC: <0.015 UG/L (ref 0–0.04)

## 2019-07-31 PROCEDURE — 84484 ASSAY OF TROPONIN QUANT: CPT | Performed by: PHYSICIAN ASSISTANT

## 2019-07-31 PROCEDURE — 80048 BASIC METABOLIC PNL TOTAL CA: CPT | Performed by: PHYSICIAN ASSISTANT

## 2019-07-31 PROCEDURE — 93005 ELECTROCARDIOGRAM TRACING: CPT

## 2019-07-31 PROCEDURE — 99284 EMERGENCY DEPT VISIT MOD MDM: CPT

## 2019-07-31 ASSESSMENT — ENCOUNTER SYMPTOMS
WEAKNESS: 0
DIFFICULTY URINATING: 0
HEADACHES: 0
NUMBNESS: 0
SHORTNESS OF BREATH: 0

## 2019-07-31 NOTE — ED AVS SNAPSHOT
[facility] Emergency Department  [address]  [facility] [address]  Phone:  [phone]  Fax:  [phone]                                    Tony Bruce   MRN: [id number]    Department:  [facility] Emergency Department   Date of Visit:  7/31/2019           After Visit Summary Signature Page    I have received my discharge instructions, and my questions have been answered. I have discussed any challenges I see with this plan with the nurse or doctor.    ..........................................................................................................................................  Patient/Patient Representative Signature      ..........................................................................................................................................  Patient Representative Print Name and Relationship to Patient    ..................................................               ................................................  Date                                   Time    ..........................................................................................................................................  Reviewed by Signature/Title    ...................................................              ..............................................  Date                                               Time          22EPIC Rev 08/18

## 2019-07-31 NOTE — DISCHARGE INSTRUCTIONS
Discharge Instructions  Hypertension - High Blood Pressure    During you visit to the Emergency Department, your blood pressure was higher than the recommended blood pressure.  This may be related to stress, pain, medication or other temporary conditions. In these cases, your blood pressure may return to normal on its own. If you have a history of high blood pressure, you may need to have your provider adjust your medications. Sometimes, your high measurement here may indicate that you have developed high blood pressure that will stay high unless it is treated. As a general rule, high blood pressure causes problems over years rather than days, weeks, or months. So, while it is important to treat blood pressure, it is rarely important to treat blood pressure immediately. Occasionally we will begin a medication in the Emergency Department; more often we will recommend close follow-up for medications with a primary doctor/clinic.    Generally, every Emergency Department visit should have a follow-up clinic visit with either a primary or a specialty clinic/provider. Please follow-up as instructed by your emergency provider today.    Return to the Emergency Department if you start to have:  A severe headache.  Chest pain.  Shortness of breath.  Weakness or numbness that affects one part of the body.  Confusion.  Vision changes.  Significant swelling of legs and/or eyes.  A reaction to any medication started in the Emergency Department.    What can I do to help myself?  Avoid alcohol.  Take any blood pressure medicine that you are prescribed.  Get a good night s sleep.  Lower your salt intake.  Exercise.  Lose weight.  Manage stress.  See your doctor regularly    If blood pressure medication was started in the Emergency Department:  The medicine may not have an immediate effect. The body and brain determine what blood pressure you have. The medicine s job is to retrain the body s  thermostat  to a lower blood  pressure.  You will need to follow up with your provider to see how this medicine is working for you.  If you were given a prescription for medicine here today, be sure to read all of the information (including the package insert) that comes with your prescription.  This will include important information about the medicine, its side effects, and any warnings that you need to know about.  The pharmacist who fills the prescription can provide more information and answer questions you may have about the medicine.  If you have questions or concerns that the pharmacist cannot address, please call or return to the Emergency Department.   Remember that you can always come back to the Emergency Department if you are not able to see your regular provider in the amount of time listed above, if you get any new symptoms, or if there is anything that worries you.
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
## 2019-08-01 LAB — INTERPRETATION ECG - MUSE: NORMAL

## 2019-08-05 ENCOUNTER — TELEPHONE (OUTPATIENT)
Dept: CARDIOLOGY | Facility: CLINIC | Age: 79
End: 2019-08-05

## 2019-08-05 NOTE — TELEPHONE ENCOUNTER
Patient called to report that sometimes when he checks his BP at varying time daily it is higher that 140's.  Patient states he went into ER 7-31-19 for feeling poorly, increased BP, diaphoresis. Medications as listed in Epic reviewed.  Patient would like an ROBYN OV to review.   Transferred to scheduling.   Patient agrees with plan.     Reviewed call with DOD.

## 2019-08-08 ENCOUNTER — TELEPHONE (OUTPATIENT)
Dept: CARDIOLOGY | Facility: CLINIC | Age: 79
End: 2019-08-08

## 2019-08-08 ENCOUNTER — OFFICE VISIT (OUTPATIENT)
Dept: CARDIOLOGY | Facility: CLINIC | Age: 79
End: 2019-08-08
Payer: COMMERCIAL

## 2019-08-08 VITALS
HEART RATE: 56 BPM | SYSTOLIC BLOOD PRESSURE: 153 MMHG | HEIGHT: 68 IN | BODY MASS INDEX: 34.62 KG/M2 | DIASTOLIC BLOOD PRESSURE: 80 MMHG | WEIGHT: 228.4 LBS

## 2019-08-08 DIAGNOSIS — I10 ESSENTIAL HYPERTENSION, BENIGN: ICD-10-CM

## 2019-08-08 DIAGNOSIS — I10 ESSENTIAL HYPERTENSION, BENIGN: Primary | ICD-10-CM

## 2019-08-08 PROCEDURE — 99214 OFFICE O/P EST MOD 30 MIN: CPT | Performed by: NURSE PRACTITIONER

## 2019-08-08 RX ORDER — AMLODIPINE BESYLATE 5 MG/1
5 TABLET ORAL DAILY
Qty: 90 TABLET | Refills: 3 | Status: SHIPPED | OUTPATIENT
Start: 2019-08-08 | End: 2020-04-08

## 2019-08-08 RX ORDER — AMLODIPINE BESYLATE 5 MG/1
5 TABLET ORAL DAILY
Qty: 90 TABLET | Refills: 3 | Status: SHIPPED | OUTPATIENT
Start: 2019-08-08 | End: 2019-08-08

## 2019-08-08 ASSESSMENT — MIFFLIN-ST. JEOR: SCORE: 1715.52

## 2019-08-08 NOTE — PROGRESS NOTES
HPI and Plan:   I had the pleasure of seeing Tony Bruce and his wife today in cardiology clinic follow up at their request and for annual follow-up. He is a pleasant 79 year old patient of Dr. Allred.    [facility]  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  He has labile hypertension.  He has untreated sleep apnea.  He had a nuclear stress test in 2009 which appeared normal.    He is doing well when he saw Dr. Allred a year ago.  He recently went to the ER at [facility], he was hypertensive about 1 in the morning with a systolic blood pressure up into the 180s.  They did one troponin and did a EKG, both which were unremarkable.  He was discharged and is here to follow-up today.  He brought with him some blood pressure readings which show his blood pressure generally to be in the 130s and 40s but he did have a systolic high of 183, his primary has not wanted to increase his losartan dose because he is also on ibuprofen for back pain.  He is currently taking losartan 50 mg daily he takes propanolol HCL for tremors, his heart rates are generally in the 50s and 60s.  He denies any chest pain, shortness of breath, PND orthopnea.  [facility], his hemoglobin was 13.5 and his creatinine 1.0.    Physical Exam  Please see Below     Assessment and Plan  1.  History of coronary artery disease.  He is on good medication management with statin, aspirin and propanolol for beta-blockade.  He is not having any ischemic symptoms.  2.  hypertension. This could be better controlled.  He had a recent visit to the ED for hypertension.  We discussed various treatment options including adding amlodipine and hydrochlorothiazide, they have read a lot  on Google about the potential side effects and neither of these medications were initially very exciting to them.  They finally agreed to try amlodipine 5 mg daily.  Apparently the son takes his medication and has problems with swelling.  We discussed that sometimes the swelling is dose dependent and that this is a medium dose.  I will have his monitor his blood pressure and let me know if it is elevated, otherwise I will see him back in clinic in a month.  If he does have problems tolerating this medication then I would switch him to hydrochlorothiazide 25 mg daily and check a BMP when I see him for follow-up.    Thank you for allowing me to care for Tony Bruce today.    TYSON Porter, CNP  Cardiology    Voice recognition software was used for this note, I have reviewed this note, but errors may have been missed.    Orders Placed This Encounter   Procedures     Follow-Up with Cardiac Advanced Practice Provider     Orders Placed This Encounter   Medications     amLODIPine (NORVASC) 5 MG tablet     Sig: Take 1 tablet (5 mg) by mouth daily     Dispense:  90 tablet     Refill:  3     There are no discontinued medications.      CURRENT MEDICATIONS:  Current Outpatient Medications   Medication Sig Dispense Refill     ALPRAZolam (XANAX) 0.25 MG tablet Take 0.25 mg by mouth 2 times daily       amLODIPine (NORVASC) 5 MG tablet Take 1 tablet (5 mg) by mouth daily 90 tablet 3     amoxicillin (AMOXIL) 500 MG capsule Take 500 mg by mouth 3 times daily Before dental work       aspirin 81 MG tablet Take 81 mg by mouth daily       atorvastatin (LIPITOR) 20 MG tablet Take 1 tablet (20 mg) by mouth daily 90 tablet 3     Calcium Carb-Cholecalciferol (CALCIUM-VITAMIN D3) 250-125 MG-UNIT TABS Take 2 tablets by mouth 2 times daily       diclofenac (VOLTAREN) 1 % GEL topical gel Place onto the skin as needed for moderate pain       docusate sodium (COLACE) 100 MG capsule Take 100 mg by mouth 2 times daily       Emollient  (VANICREAM EX) APPLY THIN LAYER    TWICE A DAY FOR DRY SKIN       hydrocortisone 2.5 % cream Apply topically 2 times daily       ibuprofen 200 MG capsule Take 800 mg by mouth 2 times daily       lidocaine (LIDODERM) 5 % Patch Place 1 patch onto the skin       losartan (COZAAR) 50 MG tablet Take 1 tablet (50 mg) by mouth daily 90 tablet 3     methocarbamol (ROBAXIN) 500 MG tablet Take 500 mg by mouth 3 times daily as needed        polyethylene glycol (MIRALAX/GLYCOLAX) Packet Take 1 packet by mouth as needed for constipation       primidone (MYSOLINE) 50 MG tablet Take 250 mg by mouth 2 times daily        propranolol HCl 60 MG TABS Take by mouth daily       psyllium 0.52 g capsule Take 1 capsule by mouth daily       traMADol (ULTRAM) 50 MG tablet TAKE ONE TABLET BY MOUTH TWICE A DAY AS NEEDED FOR LOW BACK PAIN       Urea 40 % CREA        valACYclovir (VALTREX) 1000 mg tablet TAKE TWO TABLETS BY MOUTH TWICE A DAY       Vitamin D, Cholecalciferol, 1000 units CAPS Take by mouth daily         ALLERGIES     Allergies   Allergen Reactions     Lisinopril      Morphine      confusion       PAST MEDICAL HISTORY:  Past Medical History:   Diagnosis Date     Coronary atherosclerosis of unspecified type of vessel, native or graft 8/01    cardiac cath 2005: GER to LAD; cath 2002: GER to RCA     Essential hypertension, benign     INTERMITTENT HYPERTENSION     Generalized osteoarthrosis, unspecified site      Hyperlipidaemia      Hypertrophy of prostate without urinary obstruction and other lower urinary tract symptoms (LUTS)     BPH - Dr Pinto     Impotence of organic origin     Dr Pinto - Dr Allred     NONSPECIFIC MEDICAL HISTORY     CERVICAL/LUMBAR RADICULOPATHY     NONSPECIFIC MEDICAL HISTORY     SHOULDER IMPINGEMENT     NSTEMI (non-ST elevated myocardial infarction) (H)     inferior 2001     Obese      Other and unspecified hyperlipidemia     ?       PAST SURGICAL HISTORY:  Past Surgical History:   Procedure Laterality Date      C TOTAL KNEE ARTHROPLASTY  7/04    Knee Replacement, Total - LEFT Dr Regan     CARDIAC NUC DANISHA STRESS TEST NL  4/09    normal     COLONOSCOPY  6/06    normal - diverticulosis - dr adams     COLONOSCOPY  8/14/2012    Procedure: COLONOSCOPY;  COLONOSCOPY SCREENING/ 6 YEAR FOLLOW UP;  Surgeon: Rey Adams MD;  Location: [facility]     HC REPAIR UMBILICAL CHIKA,5+Y/O,REDUC  9/04    dr dominique     SURGICAL HISTORY OF -   1990    S/P CERVICAL DISCECTOMY x 2     SURGICAL HISTORY OF -   1990    S/P LUMBAR DISCECTOMY x 2     SURGICAL HISTORY OF -   1993    S/P LT CARPAL TUNNEL SURG/SHOULDER IMPINGEMENT     SURGICAL HISTORY OF -   8/01    S/P STENT OF RT CORONARY ARTERY     SURGICAL HISTORY OF - 9/05    Drug eluting stent to LAD       FAMILY HISTORY:  Family History   Problem Relation Age of Onset     Respiratory Father         COPD     Cerebrovascular Disease Mother         CVA     C.A.D. Brother         CABG - after CABG x 3 - rheumatic fever as a child     Breast Cancer Sister      Cerebrovascular Disease Sister        SOCIAL HISTORY:  Social History     Socioeconomic History     Marital status:      Spouse name: paola     Number of children: 4     Years of education: 14     Highest education level: None   Occupational History     None   Social Needs     Financial resource strain: None     Food insecurity:     Worry: None     Inability: None     Transportation needs:     Medical: None     Non-medical: None   Tobacco Use     Smoking status: Never Smoker     Smokeless tobacco: Never Used   Substance and Sexual Activity     Alcohol use: No     Drug use: No     Sexual activity: Yes     Partners: Female   Lifestyle     Physical activity:     Days per week: None     Minutes per session: None     Stress: None   Relationships     Social connections:     Talks on phone: None     Gets together: None     Attends Nondenominational service: None     Active member of club or organization: None     Attends meetings of clubs  "or organizations: None     Relationship status: None     Intimate partner violence:     Fear of current or ex partner: None     Emotionally abused: None     Physically abused: None     Forced sexual activity: None   Other Topics Concern      Service Not Asked     Blood Transfusions Not Asked     Caffeine Concern Yes     Comment: 3-4 cups qd     Occupational Exposure Not Asked     Hobby Hazards Not Asked     Sleep Concern Not Asked     Stress Concern Not Asked     Weight Concern Not Asked     Special Diet Not Asked     Back Care Not Asked     Exercise Yes     Comment: limited     Bike Helmet Not Asked     Seat Belt Yes     Self-Exams Not Asked     Parent/sibling w/ CABG, MI or angioplasty before 65F 55M? Not Asked   Social History Narrative     None       Review of Systems:  Skin:  Negative       Eyes:  Positive for glasses    ENT:  Negative      Respiratory:  Negative       Cardiovascular:  Negative;palpitations;chest pain;edema;lightheadedness;dizziness      Gastroenterology: Negative      Genitourinary:  Negative      Musculoskeletal:  Positive for arthritis;joint pain;neck pain;back pain hands and legs cramping for a long time  Neurologic:  Negative      Psychiatric:  Negative      Heme/Lymph/Imm:  Positive for allergies    Endocrine:  Negative        Physical Exam:  Vitals: BP (!) 153/80   Pulse 56   Ht 1.727 m (5' 8\")   Wt 103.6 kg (228 lb 6.4 oz)   BMI 34.73 kg/m      Constitutional:  cooperative, alert and oriented, well developed, well nourished, in no acute distress obese      Skin:  warm and dry to the touch, no apparent skin lesions or masses noted          Head:  normocephalic, no masses or lesions        Eyes:  pupils equal and round, conjunctivae and lids unremarkable, sclera white, no xanthalasma, EOMS intact, no nystagmus        Lymph:      ENT:  no pallor or cyanosis, dentition good        Neck:  carotid pulses are full and equal bilaterally        Respiratory:  normal breath sounds, " clear to auscultation, normal A-P diameter, normal symmetry, normal respiratory excursion, no use of accessory muscles         Cardiac: regular rhythm;normal S1 and S2;no S3 or S4       systolic murmur        pulses full and equal                                        GI:    obese      Extremities and Muscular Skeletal:  no edema;no spinal abnormalities noted;normal muscle strength and tone              Neurological:  no gross motor deficits        Psych:  affect appropriate, oriented to time, person and place    Encounter Diagnosis   Name Primary?     Essential hypertension, benign Yes       Recent Lab Results:  LIPID RESULTS:  Lab Results   Component Value Date    CHOL 129 07/25/2019    HDL 65 07/25/2019    LDL 50 07/25/2019    TRIG 70 07/25/2019    CHOLHDLRATIO 2.6 05/20/2014       LIVER ENZYME RESULTS:  Lab Results   Component Value Date    AST 24 10/12/2017    ALT 33 10/12/2017       CBC RESULTS:  Lab Results   Component Value Date    WBC 4.9 04/14/2017    RBC 4.31 04/14/2017    HGB 13.5 04/14/2017    HCT 40.1 04/14/2017    MCV 93 04/14/2017    MCH 31.3 04/14/2017    MCHC 33.7 04/14/2017    RDW 12 04/14/2017     04/14/2017       BMP RESULTS:  Lab Results   Component Value Date     07/31/2019    POTASSIUM 4.1 07/31/2019    CHLORIDE 106 07/31/2019    CO2 28 07/31/2019    ANIONGAP 6 07/31/2019    GLC 95 07/31/2019    BUN 14 07/31/2019    CR 0.87 07/31/2019    GFRESTIMATED 81 07/31/2019    GFRESTBLACK >90 07/31/2019    EFREM 8.7 07/31/2019        A1C RESULTS:  Lab Results   Component Value Date    A1C 5.1 05/03/2013       INR RESULTS:  Lab Results   Component Value Date    INR 1.02 08/27/2008    INR 1.34 (H) 11/27/2007           CC  No referring provider defined for this encounter.

## 2019-08-08 NOTE — TELEPHONE ENCOUNTER
Pt called to say that [facility] now and wants us to send his new Rx for amlodipine 10 mg, take 1/2 tablet by mouth every day) to [facility] fax # [phone].  He also needs an explanation for why Berta Leal prescribed it.  (Hypertension not well treated, so discussed amlodipine vs. Hydrochlorothiazide.)    Thank you!

## 2019-08-08 NOTE — LETTER
8/8/2019    [facility]  [address]  [facility] [address]    RE: Tony Travison       Dear Colleague,    I had the pleasure of seeing Tony Bruce in the [facility].    HPI and Plan:   I had the pleasure of seeing Tony Bruce and his wife today in cardiology clinic follow up at their request and for annual follow-up. He is a pleasant 79 year old patient of Dr. Allred.    [facility]  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  He has labile hypertension.  He has untreated sleep apnea.  He had a nuclear stress test in 2009 which appeared normal.    He is doing well when he saw Dr. Allred a year ago.  He recently went to the ER at [facility], he was hypertensive about 1 in the morning with a systolic blood pressure up into the 180s.  They did one troponin and did a EKG, both which were unremarkable.  He was discharged and is here to follow-up today.  He brought with him some blood pressure readings which show his blood pressure generally to be in the 130s and 40s but he did have a systolic high of 183, his primary has not wanted to increase his losartan dose because he is also on ibuprofen for back pain.  He is currently taking losartan 50 mg daily he takes propanolol HCL for tremors, his heart rates are generally in the 50s and 60s.  He denies any chest pain, shortness of breath, PND orthopnea.  [facility] which [facility], his hemoglobin was 13.5 and his creatinine 1.0.    Physical Exam  Please see Below     Assessment and Plan  1.  History of coronary artery disease.  He is on good medication management with statin, aspirin and propanolol for beta-blockade.  He is not having any ischemic  symptoms.  2.  hypertension. This could be better controlled.  He had a recent visit to the ED for hypertension.  We discussed various treatment options including adding amlodipine and hydrochlorothiazide, they have read a lot on Google about the potential side effects and neither of these medications were initially very exciting to them.  They finally agreed to try amlodipine 5 mg daily.  Apparently the son takes his medication and has problems with swelling.  We discussed that sometimes the swelling is dose dependent and that this is a medium dose.  I will have his monitor his blood pressure and let me know if it is elevated, otherwise I will see him back in clinic in a month.  If he does have problems tolerating this medication then I would switch him to hydrochlorothiazide 25 mg daily and check a BMP when I see him for follow-up.    Thank you for allowing me to care for Tony PENNIE Travison today.    TYSON Porter, CNP  Cardiology    Voice recognition software was used for this note, I have reviewed this note, but errors may have been missed.    Orders Placed This Encounter   Procedures     Follow-Up with Cardiac Advanced Practice Provider     Orders Placed This Encounter   Medications     amLODIPine (NORVASC) 5 MG tablet     Sig: Take 1 tablet (5 mg) by mouth daily     Dispense:  90 tablet     Refill:  3     There are no discontinued medications.      CURRENT MEDICATIONS:  Current Outpatient Medications   Medication Sig Dispense Refill     ALPRAZolam (XANAX) 0.25 MG tablet Take 0.25 mg by mouth 2 times daily       amLODIPine (NORVASC) 5 MG tablet Take 1 tablet (5 mg) by mouth daily 90 tablet 3     amoxicillin (AMOXIL) 500 MG capsule Take 500 mg by mouth 3 times daily Before dental work       aspirin 81 MG tablet Take 81 mg by mouth daily       atorvastatin (LIPITOR) 20 MG tablet Take 1 tablet (20 mg) by mouth daily 90 tablet 3     Calcium Carb-Cholecalciferol (CALCIUM-VITAMIN D3) 250-125 MG-UNIT TABS Take 2  tablets by mouth 2 times daily       diclofenac (VOLTAREN) 1 % GEL topical gel Place onto the skin as needed for moderate pain       docusate sodium (COLACE) 100 MG capsule Take 100 mg by mouth 2 times daily       Emollient (VANICREAM EX) APPLY THIN LAYER    TWICE A DAY FOR DRY SKIN       hydrocortisone 2.5 % cream Apply topically 2 times daily       ibuprofen 200 MG capsule Take 800 mg by mouth 2 times daily       lidocaine (LIDODERM) 5 % Patch Place 1 patch onto the skin       losartan (COZAAR) 50 MG tablet Take 1 tablet (50 mg) by mouth daily 90 tablet 3     methocarbamol (ROBAXIN) 500 MG tablet Take 500 mg by mouth 3 times daily as needed        polyethylene glycol (MIRALAX/GLYCOLAX) Packet Take 1 packet by mouth as needed for constipation       primidone (MYSOLINE) 50 MG tablet Take 250 mg by mouth 2 times daily        propranolol HCl 60 MG TABS Take by mouth daily       psyllium 0.52 g capsule Take 1 capsule by mouth daily       traMADol (ULTRAM) 50 MG tablet TAKE ONE TABLET BY MOUTH TWICE A DAY AS NEEDED FOR LOW BACK PAIN       Urea 40 % CREA        valACYclovir (VALTREX) 1000 mg tablet TAKE TWO TABLETS BY MOUTH TWICE A DAY       Vitamin D, Cholecalciferol, 1000 units CAPS Take by mouth daily         ALLERGIES     Allergies   Allergen Reactions     Lisinopril      Morphine      confusion       PAST MEDICAL HISTORY:  Past Medical History:   Diagnosis Date     Coronary atherosclerosis of unspecified type of vessel, native or graft 8/01    cardiac cath 2005: GER to LAD; cath 2002: GER to RCA     Essential hypertension, benign     INTERMITTENT HYPERTENSION     Generalized osteoarthrosis, unspecified site      Hyperlipidaemia      Hypertrophy of prostate without urinary obstruction and other lower urinary tract symptoms (LUTS)     BPH - Dr Pinto     Impotence of organic origin     Dr Pinto - Dr Allred     NONSPECIFIC MEDICAL HISTORY     CERVICAL/LUMBAR RADICULOPATHY     NONSPECIFIC MEDICAL HISTORY     SHOULDER  IMPINGEMENT     NSTEMI (non-ST elevated myocardial infarction) (H)     inferior 2001     Obese      Other and unspecified hyperlipidemia     ?       PAST SURGICAL HISTORY:  Past Surgical History:   Procedure Laterality Date     C TOTAL KNEE ARTHROPLASTY  7/04    Knee Replacement, Total - LEFT Dr Regan     CARDIAC NUC DANISHA STRESS TEST NL  4/09    normal     COLONOSCOPY  6/06    normal - diverticulosis - dr adams     COLONOSCOPY  8/14/2012    Procedure: COLONOSCOPY;  COLONOSCOPY SCREENING/ 6 YEAR FOLLOW UP;  Surgeon: Rey Adams MD;  Location: [facility]     HC REPAIR UMBILICAL CHIKA,5+Y/O,REDUC  9/04    dr dominique     SURGICAL HISTORY OF -   1990    S/P CERVICAL DISCECTOMY x 2     SURGICAL HISTORY OF -   1990    S/P LUMBAR DISCECTOMY x 2     SURGICAL HISTORY OF -   1993    S/P LT CARPAL TUNNEL SURG/SHOULDER IMPINGEMENT     SURGICAL HISTORY OF -   8/01    S/P STENT OF RT CORONARY ARTERY     SURGICAL HISTORY OF -   9/05    Drug eluting stent to LAD       FAMILY HISTORY:  Family History   Problem Relation Age of Onset     Respiratory Father         COPD     Cerebrovascular Disease Mother         CVA     C.A.D. Brother         CABG - after CABG x 3 - rheumatic fever as a child     Breast Cancer Sister      Cerebrovascular Disease Sister        SOCIAL HISTORY:  Social History     Socioeconomic History     Marital status:      Spouse name: paola     Number of children: 4     Years of education: 14     Highest education level: None   Occupational History     None   Social Needs     Financial resource strain: None     Food insecurity:     Worry: None     Inability: None     Transportation needs:     Medical: None     Non-medical: None   Tobacco Use     Smoking status: Never Smoker     Smokeless tobacco: Never Used   Substance and Sexual Activity     Alcohol use: No     Drug use: No     Sexual activity: Yes     Partners: Female   Lifestyle     Physical activity:     Days per week: None     Minutes per session:  "None     Stress: None   Relationships     Social connections:     Talks on phone: None     Gets together: None     Attends Christianity service: None     Active member of club or organization: None     Attends meetings of clubs or organizations: None     Relationship status: None     Intimate partner violence:     Fear of current or ex partner: None     Emotionally abused: None     Physically abused: None     Forced sexual activity: None   Other Topics Concern      Service Not Asked     Blood Transfusions Not Asked     Caffeine Concern Yes     Comment: 3-4 cups qd     Occupational Exposure Not Asked     Hobby Hazards Not Asked     Sleep Concern Not Asked     Stress Concern Not Asked     Weight Concern Not Asked     Special Diet Not Asked     Back Care Not Asked     Exercise Yes     Comment: limited     Bike Helmet Not Asked     Seat Belt Yes     Self-Exams Not Asked     Parent/sibling w/ CABG, MI or angioplasty before 65F 55M? Not Asked   Social History Narrative     None       Review of Systems:  Skin:  Negative       Eyes:  Positive for glasses    ENT:  Negative      Respiratory:  Negative       Cardiovascular:  Negative;palpitations;chest pain;edema;lightheadedness;dizziness      Gastroenterology: Negative      Genitourinary:  Negative      Musculoskeletal:  Positive for arthritis;joint pain;neck pain;back pain hands and legs cramping for a long time  Neurologic:  Negative      Psychiatric:  Negative      Heme/Lymph/Imm:  Positive for allergies    Endocrine:  Negative        Physical Exam:  Vitals: BP (!) 153/80   Pulse 56   Ht 1.727 m (5' 8\")   Wt 103.6 kg (228 lb 6.4 oz)   BMI 34.73 kg/m       Constitutional:  cooperative, alert and oriented, well developed, well nourished, in no acute distress obese      Skin:  warm and dry to the touch, no apparent skin lesions or masses noted          Head:  normocephalic, no masses or lesions        Eyes:  pupils equal and round, conjunctivae and lids unremarkable, " sclera white, no xanthalasma, EOMS intact, no nystagmus        Lymph:      ENT:  no pallor or cyanosis, dentition good        Neck:  carotid pulses are full and equal bilaterally        Respiratory:  normal breath sounds, clear to auscultation, normal A-P diameter, normal symmetry, normal respiratory excursion, no use of accessory muscles         Cardiac: regular rhythm;normal S1 and S2;no S3 or S4       systolic murmur        pulses full and equal                                        GI:    obese      Extremities and Muscular Skeletal:  no edema;no spinal abnormalities noted;normal muscle strength and tone              Neurological:  no gross motor deficits        Psych:  affect appropriate, oriented to time, person and place    Encounter Diagnosis   Name Primary?     Essential hypertension, benign Yes       Recent Lab Results:  LIPID RESULTS:  Lab Results   Component Value Date    CHOL 129 07/25/2019    HDL 65 07/25/2019    LDL 50 07/25/2019    TRIG 70 07/25/2019    CHOLHDLRATIO 2.6 05/20/2014       LIVER ENZYME RESULTS:  Lab Results   Component Value Date    AST 24 10/12/2017    ALT 33 10/12/2017       CBC RESULTS:  Lab Results   Component Value Date    WBC 4.9 04/14/2017    RBC 4.31 04/14/2017    HGB 13.5 04/14/2017    HCT 40.1 04/14/2017    MCV 93 04/14/2017    MCH 31.3 04/14/2017    MCHC 33.7 04/14/2017    RDW 12 04/14/2017     04/14/2017       BMP RESULTS:  Lab Results   Component Value Date     07/31/2019    POTASSIUM 4.1 07/31/2019    CHLORIDE 106 07/31/2019    CO2 28 07/31/2019    ANIONGAP 6 07/31/2019    GLC 95 07/31/2019    BUN 14 07/31/2019    CR 0.87 07/31/2019    GFRESTIMATED 81 07/31/2019    GFRESTBLACK >90 07/31/2019    EFREM 8.7 07/31/2019        A1C RESULTS:  Lab Results   Component Value Date    A1C 5.1 05/03/2013       INR RESULTS:  Lab Results   Component Value Date    INR 1.02 08/27/2008    INR 1.34 (H) 11/27/2007           CC  No referring provider defined for this  encounter.                Thank you for allowing me to participate in the care of your patient.    Sincerely,     TYSON Quiroz CNP     [facility]

## 2019-08-08 NOTE — PATIENT INSTRUCTIONS
Your blood pressure is on the high side.    Lets add amlodipine/norvasc 5 mg daily.    Try this for a couple of weeks and let me know what your blood pressure looks like and if you have any significant problems with the medication.     See me in a month or so.    Katherin [phone]

## 2019-08-08 NOTE — TELEPHONE ENCOUNTER
Patient called and requested RN send over OV note from today's visit with ROBYN Katherin along with singed rx. RN faxed over requested documents to [phone] Patient had no further questions at this time.

## 2019-08-29 ENCOUNTER — TELEPHONE (OUTPATIENT)
Dept: CARDIOLOGY | Facility: CLINIC | Age: 79
End: 2019-08-29

## 2019-08-29 NOTE — TELEPHONE ENCOUNTER
----- Message from Sania Delgado sent at 8/29/2019  2:12 PM CDT -----  Regarding: cxlled appt FYI   Patient was scheduled to see Katherin on  9/11, he called to cancel no reason given, did not want to r/s said he may go elsewhere for f/u    MW

## 2019-09-18 ENCOUNTER — TRANSFERRED RECORDS (OUTPATIENT)
Dept: HEALTH INFORMATION MANAGEMENT | Facility: CLINIC | Age: 79
End: 2019-09-18

## 2019-09-18 LAB
ERYTHROCYTE [DISTWIDTH] IN BLOOD BY AUTOMATED COUNT: 12.2 %
HCT VFR BLD AUTO: 38.9 %
HEMOGLOBIN: 12.9 G/DL (ref 13.5–17.9)
MCH RBC QN AUTO: 32.5 PG
MCHC RBC AUTO-ENTMCNC: 33.2 G/DL
MCV RBC AUTO: 98 FL
PLATELET # BLD AUTO: 165 10^9/L
RBC # BLD AUTO: 3.97 10^12/L
WBC # BLD AUTO: 4.21 10^9/L

## 2020-03-11 ENCOUNTER — TRANSFERRED RECORDS (OUTPATIENT)
Dept: HEALTH INFORMATION MANAGEMENT | Facility: CLINIC | Age: 80
End: 2020-03-11

## 2020-03-16 ENCOUNTER — TRANSFERRED RECORDS (OUTPATIENT)
Dept: HEALTH INFORMATION MANAGEMENT | Facility: CLINIC | Age: 80
End: 2020-03-16

## 2020-03-17 ENCOUNTER — TELEPHONE (OUTPATIENT)
Dept: CARDIOLOGY | Facility: CLINIC | Age: 80
End: 2020-03-17

## 2020-03-17 DIAGNOSIS — I10 ESSENTIAL HYPERTENSION, BENIGN: Primary | ICD-10-CM

## 2020-03-17 DIAGNOSIS — I25.10 CORONARY ARTERY DISEASE INVOLVING NATIVE CORONARY ARTERY OF NATIVE HEART WITHOUT ANGINA PECTORIS: ICD-10-CM

## 2020-03-17 NOTE — TELEPHONE ENCOUNTER
Voicemail received from patient stating that he has been seen in the ED in [place] twice recently for HTN. Pt is requesting an office visit to follow up on this. He is in [place] through 4/1/20.     Returned patient's call. States he was in ED on 3/11/20 at [facility] in [place], [place]. SBP ranged 190-200's. Had associated lightheadedness but denied any chest pain, shortness of breath, headaches, vision changes, or palpitations. The last time he was in the ED was actuatlly at [facility] in July 2019. Pt states he was seen at the [place] [facility] on 3/16/20 after being in the ED, no med changes were made at that time and he was instructed to follow up with cardiology when he got back to [place]. Pt states they had mentioned increasing his losartan.    Pt states he does not check his BP regularly. BP today was 169/74. Recommended that patient start to check his BP daily 1-2hrs after his morning medications, and to keep a log of this at home. Offered telephone visit with Dr Allred on 4/8/20. Pt agreeable to plan. Message update sent to Dr Allred to see about any med changes prior.     [facility] [place].

## 2020-03-18 RX ORDER — HYDROCHLOROTHIAZIDE 25 MG/1
25 TABLET ORAL DAILY
Qty: 30 TABLET | Refills: 0 | Status: SHIPPED | OUTPATIENT
Start: 2020-03-18 | End: 2020-04-08

## 2020-03-18 NOTE — TELEPHONE ENCOUNTER
Per Dr. Allred's reply, Patient called and recommendation to start hydrochlorothiazide 25 mg daily with a BMP after 4-1-2020 when he returns home from [place].     Patient agrees with plan. Patient will continue to monitor and record BP and call with any questions.     Prescription e scribed to [facility], at [facility] and Patient will contact [facility] to send to [facility].

## 2020-03-19 ENCOUNTER — PRE VISIT (OUTPATIENT)
Dept: CARDIOLOGY | Facility: CLINIC | Age: 80
End: 2020-03-19

## 2020-03-19 DIAGNOSIS — G25.0 ESSENTIAL TREMOR: ICD-10-CM

## 2020-03-19 DIAGNOSIS — G62.9 PERIPHERAL NEUROPATHY: ICD-10-CM

## 2020-03-19 NOTE — TELEPHONE ENCOUNTER
Faxed request to [facility] for records updates    Faxed request for records to [facility] in [place], [place]: [phone], [phone]    3/24/2020 received ER note & EKGs for 3/16/2020 from [facility] in [place]. Copy sent to scan

## 2020-04-03 ENCOUNTER — TELEPHONE (OUTPATIENT)
Dept: CARDIOLOGY | Facility: CLINIC | Age: 80
End: 2020-04-03

## 2020-04-03 DIAGNOSIS — I10 ESSENTIAL HYPERTENSION, BENIGN: ICD-10-CM

## 2020-04-03 DIAGNOSIS — I25.10 CORONARY ARTERY DISEASE INVOLVING NATIVE CORONARY ARTERY OF NATIVE HEART WITHOUT ANGINA PECTORIS: ICD-10-CM

## 2020-04-03 LAB
ANION GAP SERPL CALCULATED.3IONS-SCNC: 8.4 MMOL/L (ref 6–17)
BUN SERPL-MCNC: 20 MG/DL (ref 7–30)
CALCIUM SERPL-MCNC: 9.6 MG/DL (ref 8.5–10.5)
CHLORIDE SERPL-SCNC: 101 MMOL/L (ref 98–107)
CO2 SERPL-SCNC: 32 MMOL/L (ref 23–29)
CREAT SERPL-MCNC: 1.06 MG/DL (ref 0.7–1.3)
GFR SERPL CREATININE-BSD FRML MDRD: 67 ML/MIN/{1.73_M2}
GLUCOSE SERPL-MCNC: 91 MG/DL (ref 70–105)
POTASSIUM SERPL-SCNC: 3.4 MMOL/L (ref 3.5–5.1)
SODIUM SERPL-SCNC: 138 MMOL/L (ref 136–145)

## 2020-04-03 PROCEDURE — 36415 COLL VENOUS BLD VENIPUNCTURE: CPT | Performed by: INTERNAL MEDICINE

## 2020-04-03 PROCEDURE — 80048 BASIC METABOLIC PNL TOTAL CA: CPT | Performed by: INTERNAL MEDICINE

## 2020-04-03 NOTE — TELEPHONE ENCOUNTER
Called patient to arrange BMP per Dr Allred after starting hydrochlorothiazide. Pt was still in [place] when medication was started. Call went straight to voicemail, left message for patient to call scheduling department to arrange lab appointment, phone number provided.

## 2020-04-08 ENCOUNTER — TELEPHONE (OUTPATIENT)
Dept: CARDIOLOGY | Facility: CLINIC | Age: 80
End: 2020-04-08

## 2020-04-08 ENCOUNTER — VIRTUAL VISIT (OUTPATIENT)
Dept: CARDIOLOGY | Facility: CLINIC | Age: 80
End: 2020-04-08
Payer: COMMERCIAL

## 2020-04-08 VITALS
SYSTOLIC BLOOD PRESSURE: 138 MMHG | DIASTOLIC BLOOD PRESSURE: 66 MMHG | BODY MASS INDEX: 34.1 KG/M2 | WEIGHT: 225 LBS | HEIGHT: 68 IN | HEART RATE: 55 BPM

## 2020-04-08 DIAGNOSIS — I10 ESSENTIAL HYPERTENSION, BENIGN: ICD-10-CM

## 2020-04-08 DIAGNOSIS — I10 ESSENTIAL HYPERTENSION, BENIGN: Primary | ICD-10-CM

## 2020-04-08 DIAGNOSIS — I25.10 CORONARY ARTERY DISEASE INVOLVING NATIVE CORONARY ARTERY OF NATIVE HEART WITHOUT ANGINA PECTORIS: ICD-10-CM

## 2020-04-08 DIAGNOSIS — E66.09 NON MORBID OBESITY DUE TO EXCESS CALORIES: ICD-10-CM

## 2020-04-08 DIAGNOSIS — E78.00 PURE HYPERCHOLESTEROLEMIA: ICD-10-CM

## 2020-04-08 PROCEDURE — 99213 OFFICE O/P EST LOW 20 MIN: CPT | Mod: TEL | Performed by: INTERNAL MEDICINE

## 2020-04-08 RX ORDER — TAMSULOSIN HYDROCHLORIDE 0.4 MG/1
0.4 CAPSULE ORAL DAILY
COMMUNITY

## 2020-04-08 RX ORDER — HYDROCHLOROTHIAZIDE 25 MG/1
25 TABLET ORAL DAILY
Qty: 30 TABLET | Refills: 3 | Status: SHIPPED | OUTPATIENT
Start: 2020-04-08 | End: 2020-04-08

## 2020-04-08 RX ORDER — PRIMIDONE 250 MG/1
300 TABLET ORAL
COMMUNITY

## 2020-04-08 RX ORDER — POTASSIUM CHLORIDE 1500 MG/1
20 TABLET, EXTENDED RELEASE ORAL DAILY
Qty: 90 TABLET | Refills: 3 | Status: SHIPPED | OUTPATIENT
Start: 2020-04-08 | End: 2020-09-09

## 2020-04-08 RX ORDER — HYDROCHLOROTHIAZIDE 25 MG/1
25 TABLET ORAL DAILY
Qty: 90 TABLET | Refills: 3 | Status: SHIPPED | OUTPATIENT
Start: 2020-04-08 | End: 2020-09-09

## 2020-04-08 RX ORDER — LANOLIN ALCOHOL/MO/W.PET/CERES
CREAM (GRAM) TOPICAL DAILY
COMMUNITY

## 2020-04-08 RX ORDER — METHOCARBAMOL 750 MG/1
750 TABLET, FILM COATED ORAL 3 TIMES DAILY PRN
COMMUNITY
End: 2021-09-09

## 2020-04-08 RX ORDER — POTASSIUM CHLORIDE 1500 MG/1
20 TABLET, EXTENDED RELEASE ORAL DAILY
Qty: 30 TABLET | Refills: 3 | Status: SHIPPED | OUTPATIENT
Start: 2020-04-08 | End: 2020-04-08 | Stop reason: DRUGHIGH

## 2020-04-08 RX ORDER — IBUPROFEN 600 MG/1
600 TABLET, FILM COATED ORAL 2 TIMES DAILY
COMMUNITY
End: 2021-09-09 | Stop reason: ALTCHOICE

## 2020-04-08 RX ORDER — POTASSIUM CHLORIDE 1500 MG/1
20 TABLET, EXTENDED RELEASE ORAL DAILY
Qty: 90 TABLET | Refills: 3 | Status: SHIPPED | OUTPATIENT
Start: 2020-04-08 | End: 2020-04-08

## 2020-04-08 RX ORDER — POTASSIUM CHLORIDE 1500 MG/1
20 TABLET, EXTENDED RELEASE ORAL DAILY
Qty: 90 TABLET | Refills: 3 | COMMUNITY
Start: 2020-04-08 | End: 2020-04-08

## 2020-04-08 RX ORDER — OMEGA-3 FATTY ACIDS/FISH OIL 300-1000MG
CAPSULE ORAL DAILY
COMMUNITY

## 2020-04-08 ASSESSMENT — MIFFLIN-ST. JEOR: SCORE: 1700.09

## 2020-04-08 NOTE — TELEPHONE ENCOUNTER
Call from Dr. Allred: continue hydrochlorothiazide 25mg daily with refill at quantity #90 and 3 refills. Add KCL 20 mEq daily with quantity #90 and 3 refills.  [facility], send #30 locally as needed.    Rx printed to submit with recent office note for to the [facility] for [facility] ref[facility].    Awaiting signature from Dr. Allred

## 2020-04-08 NOTE — PROGRESS NOTES
"Tony Bruce is a 79 year old male who is being evaluated via a billable telephone visit.      The patient has been notified of following:     \"This telephone visit will be conducted via a call between you and your physician/provider. We have found that certain health care needs can be provided without the need for a physical exam.  This service lets us provide the care you need with a short phone conversation.  If a prescription is necessary we can send it directly to your pharmacy.  If lab work is needed we can place an order for that and you can then stop by our lab to have the test done at a later time.    Telephone visits are billed at different rates depending on your insurance coverage. During this emergency period, for some insurers they may be billed the same as an in-person visit.  Please reach out to your insurance provider with any questions.    If during the course of the call the physician/provider feels a telephone visit is not appropriate, you will not be charged for this service.\"    Patient has given verbal consent for Telephone visit?  Yes    Tony Brcue complains of  No chief complaint on file.      I have reviewed and updated the patient's Past Medical History, Social History, Family History and Medication List.    ALLERGIES  Lisinopril and Morphine    Patient has no specific concerns today other than a couple high BP readings over the last year that he went to the ED for.  Labs done 04/03/2020.  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  Tony called from " [place] because his blood pressure was running consistently high.  I started him on hydrochlorothiazide 25 mg daily.     Tony  thinks he is doing quite well from a cardiac standpoint.  He states his blood pressures been well controlled since starting on hydrochlorothiazide.  He asks about timing whether he should continue to take all his pills in the morning. He has no chest, arm, neck, jaw or shoulder discomfort.  No dyspnea on exertion, orthopnea or PND.  No palpitations, lightheadedness, dizziness, syncope or near-syncope.  He unfortunately does not exercise regularly.  He does not follow a very strict diet.  He reports his weight at 225.  Which is down compared to our scales.  But clearly different scales.         ASSESSMENT AND PLAN:  Tony appears to be doing well from a cardiac standpoint without clinical evidence of ischemia.  Last evaluation of his coronary anatomy was a stress nuclear scan in 2009 that appeared to be normal.      He has no symptoms to suggest congestive heart failure or significant arrhythmia.      Blood pressure today is well controlled basic metabolic profile today does demonstrate low potassium at 3.4.  I will start him on a potassium supplement 20 mEq daily.  [facility]  He also asked for a 90-day prescription for his hydrochlorothiazide which I will also [facility] into the [facility].  I do not use spironolactone as he has had high potassiums in the past.  We will recheck a BMP in 1 month.  Otherwise I will have him follow-up with my ROBYN in 6 months.     We reviewed his medications.  I will continue all of the remainder of his medications as is.      Fasting lipid profile was checked last July and was excellent with total cholesterol of 129 HDL of 65 LDL of 50 triglycerides of 70.  We will repeat these again when he comes back in 6 months.     We talked about the importance of getting some sort of exercise.  Given his limitations of his back, we discussed the possibility of  using a stationary bike, that he needs to try to do something to get his weight down and increase his physical conditioning.     He is practicing Covid distancing.  He has no symptoms at this time to indicate a covert infection.  We did discuss the fact that he is a high risk individual.     I will have him follow up in 6 months.  If he should have any problems, I would be glad to see her sooner.      Thank you for allowing me to participate in his care.      Nabeel Allred MD, [facility]     Phone call duration: 14 minutes    Nabeel Allred MD

## 2020-04-15 ENCOUNTER — DOCUMENTATION ONLY (OUTPATIENT)
Dept: CARDIOLOGY | Facility: CLINIC | Age: 80
End: 2020-04-15

## 2020-04-15 NOTE — PROGRESS NOTES
Received a request from the [facility] for additional records so they can fill the hydrochlorothiazide and KCl scripts. [facility]ed to [facility] staff @ [phone] a copy of phone notes when hydrochlorothiazide was started in March, virtual visit 4/8/2020 and bmp 4/3/2020.

## 2020-06-16 ENCOUNTER — TELEPHONE (OUTPATIENT)
Dept: CARDIOLOGY | Facility: CLINIC | Age: 80
End: 2020-06-16

## 2020-06-16 DIAGNOSIS — I10 ESSENTIAL HYPERTENSION, BENIGN: Primary | ICD-10-CM

## 2020-06-16 NOTE — TELEPHONE ENCOUNTER
Patient called requesting if he can have the BMP scheduled which was recommended for May.   BMP order placed.

## 2020-06-22 ENCOUNTER — TELEPHONE (OUTPATIENT)
Dept: CARDIOLOGY | Facility: CLINIC | Age: 80
End: 2020-06-22

## 2020-06-23 ENCOUNTER — TELEPHONE (OUTPATIENT)
Dept: CARDIOLOGY | Facility: CLINIC | Age: 80
End: 2020-06-23

## 2020-06-23 DIAGNOSIS — I10 ESSENTIAL HYPERTENSION, BENIGN: ICD-10-CM

## 2020-06-23 LAB
ANION GAP SERPL CALCULATED.3IONS-SCNC: 3 MMOL/L (ref 3–14)
BUN SERPL-MCNC: 19 MG/DL (ref 7–30)
CALCIUM SERPL-MCNC: 9.1 MG/DL (ref 8.5–10.1)
CHLORIDE SERPL-SCNC: 104 MMOL/L (ref 94–109)
CO2 SERPL-SCNC: 33 MMOL/L (ref 20–32)
CREAT SERPL-MCNC: 0.84 MG/DL (ref 0.66–1.25)
GFR SERPL CREATININE-BSD FRML MDRD: 82 ML/MIN/{1.73_M2}
GLUCOSE SERPL-MCNC: 88 MG/DL (ref 70–99)
POTASSIUM SERPL-SCNC: 4 MMOL/L (ref 3.4–5.3)
SODIUM SERPL-SCNC: 140 MMOL/L (ref 133–144)

## 2020-06-23 PROCEDURE — 36415 COLL VENOUS BLD VENIPUNCTURE: CPT | Performed by: INTERNAL MEDICINE

## 2020-06-23 PROCEDURE — 80048 BASIC METABOLIC PNL TOTAL CA: CPT | Performed by: INTERNAL MEDICINE

## 2020-06-23 NOTE — TELEPHONE ENCOUNTER
BMP done today 6- - recommended to of been done in May. 1 month K+ recheck.   Last Dr. Allred visit 4-8-2020 - started  on a potassium supplement 20 mEq daily due to slight low K+. 3.4.     Next Dr. Allred visit due in Sept 2020.

## 2020-06-24 NOTE — TELEPHONE ENCOUNTER
Patient states that when he goes in for a procedure or a dental office his BP increases and sometimes the procedure then will not be done.  Sometimes BP can reach to 200/. Patient states he thinks he is anxious.    Patient wondering if he can take a medication to control BP prior  to procedure?    Patient currently taking propranolol 60 mg daily  Patient checks BP at home which usually rauns 120's/70's and HR high 50's.  Patient denies any light theadedness or dizziness.

## 2020-06-24 NOTE — TELEPHONE ENCOUNTER
Per Dr. Allred's reply Patient call. Reviewed instructions that Patient may take an extra losartan and hydrochlorothiazide prior to procedure.  Patient states it happens when he has all procedures.  Patient has upcoming back injections in a month    Patient will monitor BP and HR if he takes an extra losartan and hydrochlorothiazide.

## 2020-08-07 ENCOUNTER — TRANSFERRED RECORDS (OUTPATIENT)
Dept: HEALTH INFORMATION MANAGEMENT | Facility: CLINIC | Age: 80
End: 2020-08-07

## 2020-08-24 ENCOUNTER — PRE VISIT (OUTPATIENT)
Dept: CARDIOLOGY | Facility: CLINIC | Age: 80
End: 2020-08-24

## 2020-08-24 NOTE — TELEPHONE ENCOUNTER
Recent [facility] record[facility]s requested.    8/26/2020 received records from [facility]. Labs entered and records sent to scan

## 2020-09-08 ENCOUNTER — TELEPHONE (OUTPATIENT)
Dept: CARDIOLOGY | Facility: CLINIC | Age: 80
End: 2020-09-08

## 2020-09-08 NOTE — TELEPHONE ENCOUNTER
Wellness Screening Tool    Symptom Screening:    Do you have one of the following NEW symptoms:      Fever (subjective or >100.0)?  No    New cough? No    Shortness of breath? No    Chills? No    New loss of taste or smell? No    Generalized body aches? No    New persistent headache? No    New sore throat? No    Nausea, vomiting or diarrhea? No    Within the past 2 weeks, have you been exposed to someone with a known positive illness below?      COVID - 19 (known or suspected) No    Chicken pox?  No    Measles? No    Pertussis? No    Have you had a positive COVID-19 diagnostic test (nasal swab test) in the last 14 days or are you currently   on self-quarantine restrictions (i.e.travel restriction, exposure, etc?) No        Patient notified of visitor restriction: Yes  Patient informed to wear a mask: Yes    Patient's appointment status: Patient will be seen in clinic as scheduled on 9/9/2020

## 2020-09-09 ENCOUNTER — DOCUMENTATION ONLY (OUTPATIENT)
Dept: CARDIOLOGY | Facility: CLINIC | Age: 80
End: 2020-09-09

## 2020-09-09 ENCOUNTER — OFFICE VISIT (OUTPATIENT)
Dept: CARDIOLOGY | Facility: CLINIC | Age: 80
End: 2020-09-09
Payer: COMMERCIAL

## 2020-09-09 VITALS
WEIGHT: 227.8 LBS | BODY MASS INDEX: 34.53 KG/M2 | HEART RATE: 50 BPM | DIASTOLIC BLOOD PRESSURE: 74 MMHG | SYSTOLIC BLOOD PRESSURE: 138 MMHG | HEIGHT: 68 IN

## 2020-09-09 DIAGNOSIS — I25.10 CORONARY ARTERY DISEASE INVOLVING NATIVE CORONARY ARTERY OF NATIVE HEART WITHOUT ANGINA PECTORIS: ICD-10-CM

## 2020-09-09 DIAGNOSIS — E78.00 PURE HYPERCHOLESTEROLEMIA: Primary | Chronic | ICD-10-CM

## 2020-09-09 DIAGNOSIS — I10 ESSENTIAL HYPERTENSION, BENIGN: ICD-10-CM

## 2020-09-09 DIAGNOSIS — E66.09 NON MORBID OBESITY DUE TO EXCESS CALORIES: ICD-10-CM

## 2020-09-09 LAB
ANION GAP SERPL CALCULATED.3IONS-SCNC: 11.3 MMOL/L (ref 6–17)
BUN SERPL-MCNC: 22 MG/DL (ref 7–30)
CALCIUM SERPL-MCNC: 9.6 MG/DL (ref 8.5–10.5)
CHLORIDE SERPL-SCNC: 102 MMOL/L (ref 98–107)
CHOLEST SERPL-MCNC: 135 MG/DL
CO2 SERPL-SCNC: 30 MMOL/L (ref 23–29)
CREAT SERPL-MCNC: 1.15 MG/DL (ref 0.7–1.3)
GFR SERPL CREATININE-BSD FRML MDRD: 61 ML/MIN/{1.73_M2}
GLUCOSE SERPL-MCNC: 105 MG/DL (ref 70–105)
HDLC SERPL-MCNC: 50 MG/DL
LDLC SERPL CALC-MCNC: 65 MG/DL
NONHDLC SERPL-MCNC: 85 MG/DL
POTASSIUM SERPL-SCNC: 4.3 MMOL/L (ref 3.5–5.1)
SODIUM SERPL-SCNC: 139 MMOL/L (ref 136–145)
TRIGL SERPL-MCNC: 102 MG/DL

## 2020-09-09 PROCEDURE — 80048 BASIC METABOLIC PNL TOTAL CA: CPT | Performed by: INTERNAL MEDICINE

## 2020-09-09 PROCEDURE — 99214 OFFICE O/P EST MOD 30 MIN: CPT | Performed by: INTERNAL MEDICINE

## 2020-09-09 PROCEDURE — 36415 COLL VENOUS BLD VENIPUNCTURE: CPT | Performed by: INTERNAL MEDICINE

## 2020-09-09 PROCEDURE — 80061 LIPID PANEL: CPT | Performed by: INTERNAL MEDICINE

## 2020-09-09 RX ORDER — ATORVASTATIN CALCIUM 20 MG/1
20 TABLET, FILM COATED ORAL DAILY
Qty: 90 TABLET | Refills: 3 | Status: SHIPPED | OUTPATIENT
Start: 2020-09-09 | End: 2021-05-19

## 2020-09-09 RX ORDER — SPIRONOLACTONE 25 MG/1
25 TABLET ORAL DAILY
Qty: 90 TABLET | Refills: 3 | Status: SHIPPED | OUTPATIENT
Start: 2020-09-09 | End: 2020-09-15

## 2020-09-09 RX ORDER — LOSARTAN POTASSIUM 50 MG/1
50 TABLET ORAL DAILY
Qty: 90 TABLET | Refills: 3 | Status: SHIPPED | OUTPATIENT
Start: 2020-09-09 | End: 2024-05-01

## 2020-09-09 ASSESSMENT — MIFFLIN-ST. JEOR: SCORE: 1707.79

## 2020-09-09 NOTE — LETTER
9/9/2020      [facility]  [address]  [facility] [address]      RE: Tony Bruce       Dear Colleague,    I had the pleasure of seeing Tony Bruce in the [facility].    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  I saw Tony virtually in April of this year because of uncontrolled hypertension and started him on hydrochlorothiazide.  He subsequently developed significant hypokalemia, which I added potassium supplement to his regimen.      Tony returns for an in-person visit today.  He states he is doing quite well from a cardiac standpoint.  He has no chest, arm, neck, jaw or shoulder discomfort.  No dyspnea on exertion, orthopnea or PND.  No palpitations, lightheadedness, dizziness, syncope or near-syncope.      He and his wife are quite pleased at how well controlled his blood pressure is.  They bring in a list of blood pressures, most of which are in the upper 110s to low 120s, occasionally 140 and occasional low 100s and even one in the systolic of 90s.  As stated, he has had no problems with lightheadedness or dizziness.  He does complain about the size of the potassium pills.      ASSESSMENT AND PLAN:  Tony appears to be doing well from a cardiac standpoint without clinical evidence of ischemia and this is supported by a stress nuclear scan from 2009 which appears to be normal.      He has no symptoms to suggest heart failure or significant arrhythmia.      Blood pressure is very well controlled today at 138/74, and at home, all of them are in a very good range.  We again  reviewed the SPRINT trial and the importance of maintaining excellent blood pressure control.      Unfortunately, his weight is still 227 pounds and I emphasized the nonmedicinal things to lower blood pressure, including a low-salt diet, regular exercise and weight loss.  Compared to 2 years ago, he is down about 13 pounds, but I have told him he needs to continue to effect this.  Body mass index is 34.6.      Electrolytes are excellent.  With a potassium supplement, sodium is 140, potassium 4.0, BUN is 19, creatinine is 0.84, giving him a GFR of 82.  At this time, we decided we will stop his hydrochlorothiazide and his potassium supplement and switch him to spironolactone 25 mg.  [facility], so this process will take at least a week or so.  I will have him follow up with my ROBYN in 1 month.  We will recheck a basic metabolic profile and see what his blood pressure is doing.      Fasting lipid profile was last checked a year ago with a total cholesterol of 129, HDL 65, LDL 50, triglycerides are 70.  When he comes in 1 month, we will check a fasting lipid profile.  These numbers are outstanding.  Again, could be even better with regular exercise and weight loss.      We reviewed his medications.  We will continue them as is.  I did refill his medications for another year.  As stated[facility].  I will plan on seeing him again in 1 year.  If he should have any problems, I would be glad to see him sooner.      Thank you for allowing me to participate in his care.         BARBIE ROBERTO MD, [facility]             D: 2020   T: 2020   MT: corey      Name:     SHANA PAYNE   MRN:      [id number]        Account:      [id number]   :      1940           Service Date: 2020      Document: [id number]         Outpatient Encounter Medications as of 2020   Medication Sig Dispense Refill     amoxicillin (AMOXIL) 500 MG capsule Take 500 mg by mouth 3 times daily  Before dental work       aspirin 81 MG tablet Take 81 mg by mouth daily       atorvastatin (LIPITOR) 20 MG tablet Take 1 tablet (20 mg) by mouth daily 90 tablet 3     Calcium Carb-Cholecalciferol (CALCIUM-VITAMIN D3) 250-125 MG-UNIT TABS Take 2 tablets by mouth 2 times daily       cyanocobalamin (VITAMIN B-12) 1000 MCG tablet Take by mouth daily       diclofenac (VOLTAREN) 1 % GEL topical gel Place onto the skin as needed for moderate pain       Emollient (VANICREAM EX) APPLY THIN LAYER    TWICE A DAY FOR DRY SKIN       hydrocortisone 2.5 % cream Apply topically 2 times daily       ibuprofen (ADVIL/MOTRIN) 600 MG tablet Take 600 mg by mouth 2 times daily       lidocaine (LIDODERM) 5 % Patch Place 1 patch onto the skin       losartan (COZAAR) 50 MG tablet Take 1 tablet (50 mg) by mouth daily 90 tablet 3     methocarbamol (ROBAXIN) 750 MG tablet Take 750 mg by mouth 3 times daily as needed for muscle spasms       omega 3 1000 MG CAPS Take by mouth daily       polyethylene glycol (MIRALAX/GLYCOLAX) Packet Take 1 packet by mouth as needed for constipation       primidone (MYSOLINE) 250 MG tablet Take 250 mg by mouth 2 times daily       propranolol HCl 60 MG TABS Take by mouth daily       psyllium 0.52 g capsule Take 1 capsule by mouth daily       tamsulosin (FLOMAX) 0.4 MG capsule Take 0.4 mg by mouth daily       traMADol (ULTRAM) 50 MG tablet TAKE ONE TABLET BY MOUTH TWICE A DAY AS NEEDED FOR LOW BACK PAIN       Urea 40 % CREA        valACYclovir (VALTREX) 1000 mg tablet TAKE TWO TABLETS BY MOUTH TWICE A DAY       Vitamin D, Cholecalciferol, 1000 units CAPS Take by mouth daily       [DISCONTINUED] spironolactone (ALDACTONE) 25 MG tablet Take 1 tablet (25 mg) by mouth daily 90 tablet 3     [DISCONTINUED] atorvastatin (LIPITOR) 20 MG tablet Take 1 tablet (20 mg) by mouth daily 90 tablet 3     [DISCONTINUED] hydrochlorothiazide (HYDRODIURIL) 25 MG tablet Take 1 tablet (25 mg) by mouth daily 90 tablet 3     [DISCONTINUED]  losartan (COZAAR) 50 MG tablet Take 1 tablet (50 mg) by mouth daily 90 tablet 3     [DISCONTINUED] potassium chloride ER (KLOR-CON M) 20 MEQ CR tablet Take 1 tablet (20 mEq) by mouth daily 90 tablet 3     No facility-administered encounter medications on file as of 9/9/2020.        Again, thank you for allowing me to participate in the care of your patient.      Sincerely,    Nabeel Allred MD     [facility]

## 2020-09-09 NOTE — LETTER
9/9/2020    [facility]  [facility] [address]    RE: Tony Bruce       Dear Colleague,    I had the pleasure of seeing Tony Bruce in the [facility].    HPI and Plan:   See dictation    Orders Placed This Encounter   Procedures     Basic metabolic panel     Basic metabolic panel     Lipid Profile     Follow-Up with Cardiac Advanced Practice Provider     Follow-Up with Cardiologist       Orders Placed This Encounter   Medications     spironolactone (ALDACTONE) 25 MG tablet     Sig: Take 1 tablet (25 mg) by mouth daily     Dispense:  90 tablet     Refill:  3       Medications Discontinued During This Encounter   Medication Reason     hydrochlorothiazide (HYDRODIURIL) 25 MG tablet      potassium chloride ER (KLOR-CON M) 20 MEQ CR tablet          Encounter Diagnoses   Name Primary?     Coronary artery disease involving native coronary artery of native heart without angina pectoris      Essential hypertension, benign      Pure hypercholesterolemia Yes     Non morbid obesity due to excess calories        CURRENT MEDICATIONS:  Current Outpatient Medications   Medication Sig Dispense Refill     amoxicillin (AMOXIL) 500 MG capsule Take 500 mg by mouth 3 times daily Before dental work       aspirin 81 MG tablet Take 81 mg by mouth daily       atorvastatin (LIPITOR) 20 MG tablet Take 1 tablet (20 mg) by mouth daily 90 tablet 3     Calcium Carb-Cholecalciferol (CALCIUM-VITAMIN D3) 250-125 MG-UNIT TABS Take 2 tablets by mouth 2 times daily       cyanocobalamin (VITAMIN B-12) 1000 MCG tablet Take by mouth daily       diclofenac (VOLTAREN) 1 % GEL topical gel Place onto the skin as needed for moderate pain       Emollient (VANICREAM EX) APPLY THIN LAYER    TWICE A DAY FOR DRY SKIN       hydrocortisone 2.5 % cream Apply topically 2 times daily       ibuprofen (ADVIL/MOTRIN) 600 MG tablet Take 600 mg by mouth 2 times daily       lidocaine (LIDODERM) 5 % Patch  Place 1 patch onto the skin       losartan (COZAAR) 50 MG tablet Take 1 tablet (50 mg) by mouth daily 90 tablet 3     methocarbamol (ROBAXIN) 750 MG tablet Take 750 mg by mouth 3 times daily as needed for muscle spasms       omega 3 1000 MG CAPS Take by mouth daily       polyethylene glycol (MIRALAX/GLYCOLAX) Packet Take 1 packet by mouth as needed for constipation       primidone (MYSOLINE) 250 MG tablet Take 250 mg by mouth 2 times daily       propranolol HCl 60 MG TABS Take by mouth daily       psyllium 0.52 g capsule Take 1 capsule by mouth daily       spironolactone (ALDACTONE) 25 MG tablet Take 1 tablet (25 mg) by mouth daily 90 tablet 3     tamsulosin (FLOMAX) 0.4 MG capsule Take 0.4 mg by mouth daily       traMADol (ULTRAM) 50 MG tablet TAKE ONE TABLET BY MOUTH TWICE A DAY AS NEEDED FOR LOW BACK PAIN       Urea 40 % CREA        valACYclovir (VALTREX) 1000 mg tablet TAKE TWO TABLETS BY MOUTH TWICE A DAY       Vitamin D, Cholecalciferol, 1000 units CAPS Take by mouth daily         ALLERGIES     Allergies   Allergen Reactions     Lisinopril      Morphine      confusion       PAST MEDICAL HISTORY:  Past Medical History:   Diagnosis Date     Coronary atherosclerosis of unspecified type of vessel, native or graft 8/01    cardiac cath 2005: GER to LAD; cath 2002: GER to RCA     Essential hypertension, benign     INTERMITTENT HYPERTENSION     Essential tremor      Generalized osteoarthrosis, unspecified site      Hyperlipidaemia      Hypertrophy of prostate without urinary obstruction and other lower urinary tract symptoms (LUTS)     BPH - Dr Pinto     Impotence of organic origin     Dr Pinto - Dr Allred     NONSPECIFIC MEDICAL HISTORY     CERVICAL/LUMBAR RADICULOPATHY     NONSPECIFIC MEDICAL HISTORY     SHOULDER IMPINGEMENT     NSTEMI (non-ST elevated myocardial infarction) (H)     inferior 2001     Obese      Other and unspecified hyperlipidemia     ?     Peripheral neuropathy        PAST SURGICAL  HISTORY:  Past Surgical History:   Procedure Laterality Date     C TOTAL KNEE ARTHROPLASTY  7/04    Knee Replacement, Total - LEFT Dr Regan     CARDIAC NUC DANISHA STRESS TEST NL  4/09    normal     COLONOSCOPY  6/06    normal - diverticulosis - dr adams     COLONOSCOPY  8/14/2012    Procedure: COLONOSCOPY;  COLONOSCOPY SCREENING/ 6 YEAR FOLLOW UP;  Surgeon: Rey Adams MD;  Location: [facility]     HC REPAIR UMBILICAL CHIKA,5+Y/O,REDUC  9/04    dr dominique     SURGICAL HISTORY OF -   1990    S/P CERVICAL DISCECTOMY x 2     SURGICAL HISTORY OF -   1990    S/P LUMBAR DISCECTOMY x 2     SURGICAL HISTORY OF -   1993    S/P LT CARPAL TUNNEL SURG/SHOULDER IMPINGEMENT     SURGICAL HISTORY OF -   8/01    S/P STENT OF RT CORONARY ARTERY     SURGICAL HISTORY OF - 9/05    Drug eluting stent to LAD       FAMILY HISTORY:  Family History   Problem Relation Age of Onset     Respiratory Father         COPD     Cerebrovascular Disease Mother         CVA     C.A.D. Brother         CABG - after CABG x 3 - rheumatic fever as a child     Breast Cancer Sister      Cerebrovascular Disease Sister        SOCIAL HISTORY:  Social History     Socioeconomic History     Marital status:      Spouse name: paola     Number of children: 4     Years of education: 14     Highest education level: None   Occupational History     None   Social Needs     Financial resource strain: None     Food insecurity     Worry: None     Inability: None     Transportation needs     Medical: None     Non-medical: None   Tobacco Use     Smoking status: Never Smoker     Smokeless tobacco: Never Used   Substance and Sexual Activity     Alcohol use: No     Drug use: No     Sexual activity: Yes     Partners: Female   Lifestyle     Physical activity     Days per week: None     Minutes per session: None     Stress: None   Relationships     Social connections     Talks on phone: None     Gets together: None     Attends Sabianism service: None     Active member of  "club or organization: None     Attends meetings of clubs or organizations: None     Relationship status: None     Intimate partner violence     Fear of current or ex partner: None     Emotionally abused: None     Physically abused: None     Forced sexual activity: None   Other Topics Concern      Service Not Asked     Blood Transfusions Not Asked     Caffeine Concern Yes     Comment: 3-4 cups qd     Occupational Exposure Not Asked     Hobby Hazards Not Asked     Sleep Concern Not Asked     Stress Concern Not Asked     Weight Concern Not Asked     Special Diet Not Asked     Back Care Not Asked     Exercise Yes     Comment: limited     Bike Helmet Not Asked     Seat Belt Yes     Self-Exams Not Asked     Parent/sibling w/ CABG, MI or angioplasty before 65F 55M? Not Asked   Social History Narrative     None       Review of Systems:  Skin:  Negative       Eyes:  Positive for glasses    ENT:  Negative      Respiratory:  Negative       Cardiovascular:  Negative      Gastroenterology: Negative      Genitourinary:  Negative      Musculoskeletal:  Positive for arthritis;joint pain    Neurologic:  Positive for numbness or tingling of hands    Psychiatric:  Negative      Heme/Lymph/Imm:  Negative      Endocrine:  Negative        Physical Exam:  Vitals: /74   Pulse 50   Ht 1.727 m (5' 8\")   Wt 103.3 kg (227 lb 12.8 oz)   BMI 34.64 kg/m      Constitutional:  cooperative, alert and oriented, well developed, well nourished, in no acute distress obese      Skin:  warm and dry to the touch, no apparent skin lesions or masses noted          Head:  normocephalic, no masses or lesions        Eyes:  pupils equal and round, conjunctivae and lids unremarkable, sclera white, no xanthalasma, EOMS intact, no nystagmus        Lymph:      ENT:  no pallor or cyanosis, dentition good        Neck:  carotid pulses are full and equal bilaterally        Respiratory:  normal breath sounds, clear to auscultation, normal A-P diameter, " normal symmetry, normal respiratory excursion, no use of accessory muscles         Cardiac: regular rhythm;normal S1 and S2;no S3 or S4       systolic murmur        pulses full and equal                                        GI:    obese      Extremities and Muscular Skeletal:  no edema;no spinal abnormalities noted;normal muscle strength and tone              Neurological:  no gross motor deficits        Psych:  affect appropriate, oriented to time, person and place        CC  Nabeel Allred MD  [address]  [place] [address]                Thank you for allowing me to participate in the care of your patient.      Sincerely,     Nabeel Allred MD     [facility]    cc:   Nabeel Allred MD  [address]  [place] [address]

## 2020-09-09 NOTE — PROGRESS NOTES
HPI and Plan:   See dictation    Orders Placed This Encounter   Procedures     Basic metabolic panel     Basic metabolic panel     Lipid Profile     Follow-Up with Cardiac Advanced Practice Provider     Follow-Up with Cardiologist       Orders Placed This Encounter   Medications     spironolactone (ALDACTONE) 25 MG tablet     Sig: Take 1 tablet (25 mg) by mouth daily     Dispense:  90 tablet     Refill:  3       Medications Discontinued During This Encounter   Medication Reason     hydrochlorothiazide (HYDRODIURIL) 25 MG tablet      potassium chloride ER (KLOR-CON M) 20 MEQ CR tablet          Encounter Diagnoses   Name Primary?     Coronary artery disease involving native coronary artery of native heart without angina pectoris      Essential hypertension, benign      Pure hypercholesterolemia Yes     Non morbid obesity due to excess calories        CURRENT MEDICATIONS:  Current Outpatient Medications   Medication Sig Dispense Refill     amoxicillin (AMOXIL) 500 MG capsule Take 500 mg by mouth 3 times daily Before dental work       aspirin 81 MG tablet Take 81 mg by mouth daily       atorvastatin (LIPITOR) 20 MG tablet Take 1 tablet (20 mg) by mouth daily 90 tablet 3     Calcium Carb-Cholecalciferol (CALCIUM-VITAMIN D3) 250-125 MG-UNIT TABS Take 2 tablets by mouth 2 times daily       cyanocobalamin (VITAMIN B-12) 1000 MCG tablet Take by mouth daily       diclofenac (VOLTAREN) 1 % GEL topical gel Place onto the skin as needed for moderate pain       Emollient (VANICREAM EX) APPLY THIN LAYER    TWICE A DAY FOR DRY SKIN       hydrocortisone 2.5 % cream Apply topically 2 times daily       ibuprofen (ADVIL/MOTRIN) 600 MG tablet Take 600 mg by mouth 2 times daily       lidocaine (LIDODERM) 5 % Patch Place 1 patch onto the skin       losartan (COZAAR) 50 MG tablet Take 1 tablet (50 mg) by mouth daily 90 tablet 3     methocarbamol (ROBAXIN) 750 MG tablet Take 750 mg by mouth 3 times daily as needed for muscle spasms        omega 3 1000 MG CAPS Take by mouth daily       polyethylene glycol (MIRALAX/GLYCOLAX) Packet Take 1 packet by mouth as needed for constipation       primidone (MYSOLINE) 250 MG tablet Take 250 mg by mouth 2 times daily       propranolol HCl 60 MG TABS Take by mouth daily       psyllium 0.52 g capsule Take 1 capsule by mouth daily       spironolactone (ALDACTONE) 25 MG tablet Take 1 tablet (25 mg) by mouth daily 90 tablet 3     tamsulosin (FLOMAX) 0.4 MG capsule Take 0.4 mg by mouth daily       traMADol (ULTRAM) 50 MG tablet TAKE ONE TABLET BY MOUTH TWICE A DAY AS NEEDED FOR LOW BACK PAIN       Urea 40 % CREA        valACYclovir (VALTREX) 1000 mg tablet TAKE TWO TABLETS BY MOUTH TWICE A DAY       Vitamin D, Cholecalciferol, 1000 units CAPS Take by mouth daily         ALLERGIES     Allergies   Allergen Reactions     Lisinopril      Morphine      confusion       PAST MEDICAL HISTORY:  Past Medical History:   Diagnosis Date     Coronary atherosclerosis of unspecified type of vessel, native or graft 8/01    cardiac cath 2005: GER to LAD; cath 2002: GER to RCA     Essential hypertension, benign     INTERMITTENT HYPERTENSION     Essential tremor      Generalized osteoarthrosis, unspecified site      Hyperlipidaemia      Hypertrophy of prostate without urinary obstruction and other lower urinary tract symptoms (LUTS)     BPH - Dr Pinto     Impotence of organic origin     Dr Pinto - Dr Allred     NONSPECIFIC MEDICAL HISTORY     CERVICAL/LUMBAR RADICULOPATHY     NONSPECIFIC MEDICAL HISTORY     SHOULDER IMPINGEMENT     NSTEMI (non-ST elevated myocardial infarction) (H)     inferior 2001     Obese      Other and unspecified hyperlipidemia     ?     Peripheral neuropathy        PAST SURGICAL HISTORY:  Past Surgical History:   Procedure Laterality Date     C TOTAL KNEE ARTHROPLASTY  7/04    Knee Replacement, Total - LEFT Dr Regan     CARDIAC NUC DANISHA STRESS TEST NL  4/09    normal     COLONOSCOPY  6/06    normal -  diverticulosis - dr adams     COLONOSCOPY  8/14/2012    Procedure: COLONOSCOPY;  COLONOSCOPY SCREENING/ 6 YEAR FOLLOW UP;  Surgeon: Rey Adams MD;  Location: [facility]     HC REPAIR UMBILICAL CHIKA,5+Y/O,REDUC  9/04    dr dominique     SURGICAL HISTORY OF -   1990    S/P CERVICAL DISCECTOMY x 2     SURGICAL HISTORY OF -   1990    S/P LUMBAR DISCECTOMY x 2     SURGICAL HISTORY OF -   1993    S/P LT CARPAL TUNNEL SURG/SHOULDER IMPINGEMENT     SURGICAL HISTORY OF -   8/01    S/P STENT OF RT CORONARY ARTERY     SURGICAL HISTORY OF - 9/05    Drug eluting stent to LAD       FAMILY HISTORY:  Family History   Problem Relation Age of Onset     Respiratory Father         COPD     Cerebrovascular Disease Mother         CVA     C.A.D. Brother         CABG - after CABG x 3 - rheumatic fever as a child     Breast Cancer Sister      Cerebrovascular Disease Sister        SOCIAL HISTORY:  Social History     Socioeconomic History     Marital status:      Spouse name: paola     Number of children: 4     Years of education: 14     Highest education level: None   Occupational History     None   Social Needs     Financial resource strain: None     Food insecurity     Worry: None     Inability: None     Transportation needs     Medical: None     Non-medical: None   Tobacco Use     Smoking status: Never Smoker     Smokeless tobacco: Never Used   Substance and Sexual Activity     Alcohol use: No     Drug use: No     Sexual activity: Yes     Partners: Female   Lifestyle     Physical activity     Days per week: None     Minutes per session: None     Stress: None   Relationships     Social connections     Talks on phone: None     Gets together: None     Attends Scientologist service: None     Active member of club or organization: None     Attends meetings of clubs or organizations: None     Relationship status: None     Intimate partner violence     Fear of current or ex partner: None     Emotionally abused: None     Physically  "abused: None     Forced sexual activity: None   Other Topics Concern      Service Not Asked     Blood Transfusions Not Asked     Caffeine Concern Yes     Comment: 3-4 cups qd     Occupational Exposure Not Asked     Hobby Hazards Not Asked     Sleep Concern Not Asked     Stress Concern Not Asked     Weight Concern Not Asked     Special Diet Not Asked     Back Care Not Asked     Exercise Yes     Comment: limited     Bike Helmet Not Asked     Seat Belt Yes     Self-Exams Not Asked     Parent/sibling w/ CABG, MI or angioplasty before 65F 55M? Not Asked   Social History Narrative     None       Review of Systems:  Skin:  Negative       Eyes:  Positive for glasses    ENT:  Negative      Respiratory:  Negative       Cardiovascular:  Negative      Gastroenterology: Negative      Genitourinary:  Negative      Musculoskeletal:  Positive for arthritis;joint pain    Neurologic:  Positive for numbness or tingling of hands    Psychiatric:  Negative      Heme/Lymph/Imm:  Negative      Endocrine:  Negative        Physical Exam:  Vitals: /74   Pulse 50   Ht 1.727 m (5' 8\")   Wt 103.3 kg (227 lb 12.8 oz)   BMI 34.64 kg/m      Constitutional:  cooperative, alert and oriented, well developed, well nourished, in no acute distress obese      Skin:  warm and dry to the touch, no apparent skin lesions or masses noted          Head:  normocephalic, no masses or lesions        Eyes:  pupils equal and round, conjunctivae and lids unremarkable, sclera white, no xanthalasma, EOMS intact, no nystagmus        Lymph:      ENT:  no pallor or cyanosis, dentition good        Neck:  carotid pulses are full and equal bilaterally        Respiratory:  normal breath sounds, clear to auscultation, normal A-P diameter, normal symmetry, normal respiratory excursion, no use of accessory muscles         Cardiac: regular rhythm;normal S1 and S2;no S3 or S4       systolic murmur        pulses full and equal                                    "     GI:    obese      Extremities and Muscular Skeletal:  no edema;no spinal abnormalities noted;normal muscle strength and tone              Neurological:  no gross motor deficits        Psych:  affect appropriate, oriented to time, person and place        CC  Nabeel Allred MD  [address]  [place] [id number]

## 2020-09-09 NOTE — PROGRESS NOTES
Message from Dr. Allred: patient brought a form to his visit from [facility] to order a blood pressure cuff under his policy.    Faxed completed and signed form to [facility] at [phone]. Copy sent to scan

## 2020-09-09 NOTE — PROGRESS NOTES
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  I saw Tony virtually in April of this year because of uncontrolled hypertension and started him on hydrochlorothiazide.  He subsequently developed significant hypokalemia, which I added potassium supplement to his regimen.      Tony returns for an in-person visit today.  He states he is doing quite well from a cardiac standpoint.  He has no chest, arm, neck, jaw or shoulder discomfort.  No dyspnea on exertion, orthopnea or PND.  No palpitations, lightheadedness, dizziness, syncope or near-syncope.      He and his wife are quite pleased at how well controlled his blood pressure is.  They bring in a list of blood pressures, most of which are in the upper 110s to low 120s, occasionally 140 and occasional low 100s and even one in the systolic of 90s.  As stated, he has had no problems with lightheadedness or dizziness.  He does complain about the size of the potassium pills.      ASSESSMENT AND PLAN:  Tony appears to be doing well from a cardiac standpoint without clinical evidence of ischemia and this is supported by a stress nuclear scan from 2009 which appears to be normal.      He has no symptoms to suggest heart failure or significant arrhythmia.      Blood pressure is very well controlled today at 138/74, and at home, all of them are in a very good range.  We again reviewed the SPRINT trial and the importance of maintaining excellent blood pressure control.      Unfortunately, his weight is still 227 pounds and I emphasized the nonmedicinal things to lower blood pressure, including a low-salt  diet, regular exercise and weight loss.  Compared to 2 years ago, he is down about 13 pounds, but I have told him he needs to continue to effect this.  Body mass index is 34.6.      Electrolytes are excellent.  With a potassium supplement, sodium is 140, potassium 4.0, BUN is 19, creatinine is 0.84, giving him a GFR of 82.  At this time, we decided we will stop his hydrochlorothiazide and his potassium supplement and switch him to spironolactone 25 mg.  [facility] so this process will take at least a week or so.  I will have him follow up with my ROBYN in 1 month.  We will recheck a basic metabolic profile and see what his blood pressure is doing.      Fasting lipid profile was last checked a year ago with a total cholesterol of 129, HDL 65, LDL 50, triglycerides are 70.  When he comes in 1 month, we will check a fasting lipid profile.  These numbers are outstanding.  Again, could be even better with regular exercise and weight loss.      We reviewed his medications.  We will continue them as is.  I did refill his medications for another year.  As stated[facility].  I will plan on seeing him again in 1 year.  If he should have any problems, I would be glad to see him sooner.      Thank you for allowing me to participate in his care.         BARBIE ROBERTO MD, [facility]             D: 2020   T: 2020   MT: corey      Name:     SHANA PAYNE   MRN:      [id number]        Account:      [id number]   :      1940           Service Date: 2020      Document: [id number]

## 2020-09-15 ENCOUNTER — DOCUMENTATION ONLY (OUTPATIENT)
Dept: CARDIOLOGY | Facility: CLINIC | Age: 80
End: 2020-09-15

## 2020-09-15 DIAGNOSIS — I10 ESSENTIAL HYPERTENSION, BENIGN: ICD-10-CM

## 2020-09-15 RX ORDER — SPIRONOLACTONE 25 MG/1
25 TABLET ORAL DAILY
Qty: 90 TABLET | Refills: 3 | Status: SHIPPED | OUTPATIENT
Start: 2020-09-15

## 2020-09-15 NOTE — PROGRESS NOTES
Call from patient, he needs the spironolactone script and [facility] to [facility].    [facility] Allred for signature    [id number] signed script and [facility] to [facility]

## 2020-10-05 ENCOUNTER — TELEPHONE (OUTPATIENT)
Dept: CARDIOLOGY | Facility: CLINIC | Age: 80
End: 2020-10-05

## 2020-10-05 NOTE — TELEPHONE ENCOUNTER
----- Message from Osiris Christensen sent at 10/5/2020  2:07 PM CDT -----  Regarding: Need order for flp. Thanks

## 2020-10-05 NOTE — TELEPHONE ENCOUNTER
Message left for Patient .  FLP's done 9-9-2020 same day as Dr. Allred visit.  Appears may not of been reviewed at that visit.  Message left to discuss FLP's with Patient and if need to be repeated.  No statin changes made at that visit.    BMP to be repeated at ROBYN visit as recommended at Dr. Allred visit 9-9-2020 - Electrolytes are excellent.  With a potassium supplement, sodium is 140, potassium 4.0, BUN is 19, creatinine is 0.84, giving him a GFR of 82.  At this time, we decided we will stop his hydrochlorothiazide and his potassium supplement and switch him to spironolactone 25 mg.

## 2020-10-06 NOTE — TELEPHONE ENCOUNTER
Spoke to patient's wife who will relay message- do not need to fast for tomorrow's lab draw, FLP portion cancelled as was updated 9/9/20.     Message to Dr Allred to review FLP results from 9/9/20.

## 2020-10-07 ENCOUNTER — DOCUMENTATION ONLY (OUTPATIENT)
Dept: CARDIOLOGY | Facility: CLINIC | Age: 80
End: 2020-10-07

## 2020-10-07 DIAGNOSIS — I10 ESSENTIAL HYPERTENSION, BENIGN: ICD-10-CM

## 2020-10-07 LAB
ANION GAP SERPL CALCULATED.3IONS-SCNC: 11.6 MMOL/L (ref 6–17)
BUN SERPL-MCNC: 20 MG/DL (ref 7–30)
CALCIUM SERPL-MCNC: 9.3 MG/DL (ref 8.5–10.5)
CHLORIDE SERPL-SCNC: 105 MMOL/L (ref 98–107)
CO2 SERPL-SCNC: 29 MMOL/L (ref 23–29)
CREAT SERPL-MCNC: 1.21 MG/DL (ref 0.7–1.3)
GFR SERPL CREATININE-BSD FRML MDRD: 57 ML/MIN/{1.73_M2}
GLUCOSE SERPL-MCNC: 134 MG/DL (ref 70–105)
POTASSIUM SERPL-SCNC: 4.6 MMOL/L (ref 3.5–5.1)
SODIUM SERPL-SCNC: 141 MMOL/L (ref 136–145)

## 2020-10-07 PROCEDURE — 80048 BASIC METABOLIC PNL TOTAL CA: CPT | Performed by: INTERNAL MEDICINE

## 2020-10-07 NOTE — PROGRESS NOTES
Patient stopped by the office, gave him lipids and bmp from 9/9/2020 as bmp form today was not done. Gave him a paper copy of Dr. Allred's review.

## 2020-10-07 NOTE — TELEPHONE ENCOUNTER
Attempted to reach patient to review FLP and message from Dr Allred, left message with wife to have patient call back.       Addendum 1120: spoke to patient to review FLP and message from Dr Allred. Pt verbalized understanding, requests copy of results be mailed to home address. Message to in-clinic RN to send. Reviewed that BMP results from today are not yet available, will be discussed with him tomorrow at his ROBYN f/up. Pt verbalized understanding.

## 2020-10-07 NOTE — TELEPHONE ENCOUNTER
Numbers are still very good but a backslide from last year. He needs to exercise daily, eat a prdominately plant based diet and lose weight.

## 2020-10-08 ENCOUNTER — OFFICE VISIT (OUTPATIENT)
Dept: CARDIOLOGY | Facility: CLINIC | Age: 80
End: 2020-10-08
Attending: INTERNAL MEDICINE
Payer: COMMERCIAL

## 2020-10-08 VITALS
BODY MASS INDEX: 34.74 KG/M2 | HEIGHT: 68 IN | SYSTOLIC BLOOD PRESSURE: 138 MMHG | WEIGHT: 229.2 LBS | DIASTOLIC BLOOD PRESSURE: 80 MMHG | HEART RATE: 48 BPM

## 2020-10-08 DIAGNOSIS — G47.33 OSA (OBSTRUCTIVE SLEEP APNEA): Chronic | ICD-10-CM

## 2020-10-08 DIAGNOSIS — I10 ESSENTIAL HYPERTENSION, BENIGN: ICD-10-CM

## 2020-10-08 DIAGNOSIS — E78.00 PURE HYPERCHOLESTEROLEMIA: Chronic | ICD-10-CM

## 2020-10-08 DIAGNOSIS — E66.09 NON MORBID OBESITY DUE TO EXCESS CALORIES: ICD-10-CM

## 2020-10-08 DIAGNOSIS — I25.10 CORONARY ARTERY DISEASE INVOLVING NATIVE CORONARY ARTERY OF NATIVE HEART WITHOUT ANGINA PECTORIS: Primary | ICD-10-CM

## 2020-10-08 PROCEDURE — 99214 OFFICE O/P EST MOD 30 MIN: CPT | Performed by: NURSE PRACTITIONER

## 2020-10-08 ASSESSMENT — MIFFLIN-ST. JEOR: SCORE: 1714.14

## 2020-10-08 NOTE — PATIENT INSTRUCTIONS
Thanks for participating in a office visit with the [facility] today.    Doing well on a cardiac standpoint   Blood pressures well controlled on current medical therapy.   Tolerating spironolactone well   Continue on current medical therapy.     Follow up in May upon return from [place].     Please call my nurse at [phone] with any questions or concerns.    Scheduling phone number: [phone]  Reminder: Please bring in all current medications, over the counter supplements and vitamin bottles to your next appointment.        Patient Education     Eating Heart-Healthy Foods  Eating has a big impact on your heart health. In fact, eating healthier can improve several of your heart risks at once. For instance, it helps you manage weight, cholesterol, and blood pressure. Here are ideas to help you make heart-healthy changes without giving up all the foods and flavors you love.  Getting started    Talk with your healthcare provider about eating plans, such as the DASH or Mediterranean diet. You may also be referred to a dietitian.    Change a few things at a time. Give yourself time to get used to a few eating changes before adding more.    Work to create a tasty, healthy eating plan that you can stick to for the rest of your life.    Goals for healthy eating  Below are some tips to improve your eating habits:    Limit saturated fats and trans fats. Saturated fats raise your levels of cholesterol, so keep these fats to a minimum. They are found in foods such as fatty meats, whole milk, cheese, and palm and coconut oils. Avoid trans fats because they lower good cholesterol as well as raise bad cholesterol. Trans fats are most often found in processed foods.    Reduce sodium (salt) intake. Eating too much salt may increase your blood pressure. Limit your sodium intake to 2,300 milligrams (mg) per day (the amount in 1 teaspoon of salt), or less if your healthcare provider recommends it. Dining  out less often and eating fewer processed foods are two great ways to decrease the amount of salt you consume.    Managing calories. A calorie is a unit of energy. Your body burns calories for fuel, but if you eat more calories than your body burns, the extras are stored as fat. Your healthcare provider can help you create a diet plan to manage your calories. This will likely include eating healthier foods as well as exercising regularly. To help you track your progress, keep a diary to record what you eat and how often you exercise.  Choose the right foods  Aim to make these foods staples of your diet. If you have diabetes, you may have different recommendations than what is listed here:    Fruits and vegetables provide plenty of nutrients without a lot of calories. At meals, fill half your plate with these foods. Split the other half of your plate between whole grains and lean protein.    Whole grains are high in fiber and rich in vitamins and nutrients. Good choices include whole-wheat bread, pasta, and brown rice.    Lean proteins give you nutrition with less fat. Good choices include fish, skinless chicken, and beans.    Low-fat or nonfat dairy provides nutrients without a lot of fat. Try low-fat or nonfat milk, cheese, or yogurt.    Healthy fats can be good for you in small amounts. These are unsaturated fats, such as olive oil, nuts, and fish. Try to have at least 2 servings per week of fatty fish, such as salmon, sardines, mackerel, rainbow trout, and albacore tuna. These contain omega-3 fatty acids, which are good for your heart. Flaxseed is another source of a heart-healthy fat.  More on heart-healthy eating  Read food labels  Healthy eating starts at the grocery store. Be sure to pay attention to food labels on packaged foods. Look for products that are high in fiber and protein, and low in saturated fat, cholesterol, and sodium. Avoid products that contain trans fat. And pay close attention to serving  size. For instance, if you plan to eat two servings, double all the numbers on the label.  Prepare food right  A key part of healthy cooking is cutting down on added fat and salt. Look on the internet for lower-fat, lower-sodium recipes. Also, try these tips:    Remove fat from meat and skin from poultry before cooking.    Skim fat from the surface of soups and sauces.    Broil, boil, bake, steam, grill, and microwave food without added fats.    Choose ingredients that spice up your food without adding calories, fat, or sodium. Try these items: horseradish, hot sauce, lemon, mustard, nonfat salad dressings, and vinegar. For salt-free herbs and spices, try basil, cilantro, cinnamon, pepper, and rosemary.  Date Last Reviewed: 10/1/2017    5828-1818 [employer]. [address] [place] [address]. All rights reserved. This information is not intended as a substitute for professional medical care. Always follow your healthcare professional's instructions.

## 2020-10-08 NOTE — LETTER
10/8/2020    [facility]  [address]  [facility] [address]    RE: Tony Bruce       Dear Colleague,    I had the pleasure of seeing Tony Bruce in the [facility].    Cardiology Clinic Progress Note  Tony Bruce MRN# [id number]   YOB: 1940 Age: 80 year old     Reason For Visit:  Medication follow up   Primary Cardiologist:   Dr. 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     He was last seen on 9/9/2020 for routine follow-up.  He offered no cardiac complaints. He was noted to by on both hydrochlorothiazide and potassium supplement and recommended he stop both and start spironolactone 25 mg. He returns to the office today for reassessment.     He is feeling well on a cardiac standpoint, denies chest pain, shortness of breath, PND, orthopnea, presyncope, syncope, edema, heart racing, or palpitations.  Upon exam, lungs are clear bilaterally, heart rhythm is regular, bradycardic, no neck vein dilation, or lower extremity edema.    Blood pressure today was initially elevated at 168/72, follow-up reading reduced to 138/80.  He reports home pressures are consistent in the 120s systolic.  Follow up BMP showed a sodium of 141, potassium 4.6, BUN 20, Creatinine 1.21, and GFR 57  Lipid profile is excellent showing a total cholesterol of 135, HDL 50, LDL 65, and , managed on low dose atorvastatin.   BMI 34.85    Activity is limited due to back pain and bilateral knee replacements.  He plans to travel to [place] at the end of this month where he will spend the winter months.  He plans to use the pool for  exercise.    He is currently on the Atkins diet  Sleeping well, does not use CPAP.  Remains compliant with all medications.                   Assessment and Plan:       1. Hypertension -well-controlled on current medical therapy  2. CAD -no ischemic symptoms.  Remote history of RCA and LAD stenting, angioplasty of the first diagonal in 2005.  Continue on propranolol, losartan, aspirin, Spironolactone, and statin.  Encourage exercise, weight loss, and heart healthy diet.  3. Hyperlipidemia -LDL at goal 65, continue on atorvastatin  4. Obesity -encourage weight loss.  Written material for heart healthy diet provided.  5. LIZANDRO -mild.  Does not use CPAP.                Thank you for allowing me to participate in this delightful patient's care. I have recommended he follow up in 7 months upon return from [place] with Dr. Allred..       Aurora Linton, TYSON CNP         Review of Systems:     Review of Systems:  Skin:  Negative     Eyes:  Positive for glasses  ENT:  Negative    Respiratory:  Negative    Cardiovascular:  Negative    Gastroenterology: Negative    Genitourinary:  Negative    Musculoskeletal:  Positive for joint pain;arthritis  Neurologic:  Negative    Psychiatric:  Negative    Heme/Lymph/Imm:  Negative    Endocrine:  Negative                Physical Exam:     GEN:  In general, this is a overweight male in no acute distress.  HEENT:  Pupils equal, round. Sclerae nonicteric. Clear oropharynx. Mucous membranes moist.  NECK: Supple, no masses appreciated. Trachea midline.  No JVD   C/V:  Regular rate and rhythm, no murmur, rub or gallop. No S3 or RV heave.   RESP: Respirations are unlabored. No use of accessory muscles. Clear to auscultation bilaterally without wheezing, rales, or rhonchi.  GI: Abdomen soft, nontender, nondistended. No HSM appreciated.   EXTREM: No LE edema. No cyanosis or clubbing.  NEURO: Alert and oriented, cooperative. Gait steady. No obvious focal deficits.   PSYCH: Normal affect.  SKIN:  Warm and dry. No rashes or petechiae appreciated.          Past Medical History:     Past Medical History:   Diagnosis Date     Coronary atherosclerosis of unspecified type of vessel, native or graft 8/01    cardiac cath 2005: GER to LAD; cath 2002: GER to RCA     Essential hypertension, benign     INTERMITTENT HYPERTENSION     Essential tremor      Generalized osteoarthrosis, unspecified site      Hyperlipidaemia      Hypertrophy of prostate without urinary obstruction and other lower urinary tract symptoms (LUTS)     BPH - Dr Pinto     Impotence of organic origin     Dr Pinto - Dr Allred     NONSPECIFIC MEDICAL HISTORY     CERVICAL/LUMBAR RADICULOPATHY     NONSPECIFIC MEDICAL HISTORY     SHOULDER IMPINGEMENT     NSTEMI (non-ST elevated myocardial infarction) (H)     inferior 2001     Obese      Other and unspecified hyperlipidemia     ?     Peripheral neuropathy               Past Surgical History:     Past Surgical History:   Procedure Laterality Date     C TOTAL KNEE ARTHROPLASTY  7/04    Knee Replacement, Total - LEFT Dr Regan     CARDIAC NUC DANISHA STRESS TEST NL  4/09    normal     COLONOSCOPY  6/06    normal - diverticulosis - dr adams     COLONOSCOPY  8/14/2012    Procedure: COLONOSCOPY;  COLONOSCOPY SCREENING/ 6 YEAR FOLLOW UP;  Surgeon: Rey Adams MD;  Location: [facility]     HC REPAIR UMBILICAL CHIKA,5+Y/O,REDUC  9/04    dr dominique     SURGICAL HISTORY OF -   1990    S/P CERVICAL DISCECTOMY x 2     SURGICAL HISTORY OF -   1990    S/P LUMBAR DISCECTOMY x 2     SURGICAL HISTORY OF -   1993    S/P LT CARPAL TUNNEL SURG/SHOULDER IMPINGEMENT     SURGICAL HISTORY OF -   8/01    S/P STENT OF RT CORONARY ARTERY     SURGICAL HISTORY OF -   9/05    Drug eluting stent to LAD              Allergies:   Lisinopril and Morphine       Data:   All laboratory data reviewed:    LAST CHOLESTEROL:  Lab Results   Component Value Date    CHOL 135 09/09/2020     Lab Results   Component Value Date    HDL 50 09/09/2020      Lab Results   Component Value Date    LDL 65 09/09/2020     Lab Results   Component Value Date    TRIG 102 09/09/2020     Lab Results   Component Value Date    CHOLHDLRATIO 2.6 05/20/2014       LAST BMP:  Lab Results   Component Value Date     10/07/2020      Lab Results   Component Value Date    POTASSIUM 4.6 10/07/2020     Lab Results   Component Value Date    CHLORIDE 105 10/07/2020     Lab Results   Component Value Date    EFREM 9.3 10/07/2020     Lab Results   Component Value Date    CO2 29 10/07/2020     Lab Results   Component Value Date    BUN 20 10/07/2020     Lab Results   Component Value Date    CR 1.21 10/07/2020     Lab Results   Component Value Date     10/07/2020       LAST CBC:  Lab Results   Component Value Date    WBC 4.21 09/18/2019     Lab Results   Component Value Date    RBC 3.97 09/18/2019     Lab Results   Component Value Date    HGB 12.9 09/18/2019     Lab Results   Component Value Date    HCT 38.9 09/18/2019     Lab Results   Component Value Date    MCV 98 09/18/2019     Lab Results   Component Value Date    MCH 32.5 09/18/2019     Lab Results   Component Value Date    MCHC 33.2 09/18/2019     Lab Results   Component Value Date    RDW 12.2 09/18/2019     Lab Results   Component Value Date     09/18/2019       Thank you for allowing me to participate in the care of your patient.    Sincerely,     TYSON Garcia [facility]

## 2020-10-08 NOTE — PROGRESS NOTES
Cardiology Clinic Progress Note  Tony Bruce MRN# [id number]   YOB: 1940 Age: 80 year old     Reason For Visit:  Medication follow up   Primary Cardiologist:   Dr. 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     He was last seen on 9/9/2020 for routine follow-up.  He offered no cardiac complaints. He was noted to by on both hydrochlorothiazide and potassium supplement and recommended he stop both and start spironolactone 25 mg. He returns to the office today for reassessment.     He is feeling well on a cardiac standpoint, denies chest pain, shortness of breath, PND, orthopnea, presyncope, syncope, edema, heart racing, or palpitations.  Upon exam, lungs are clear bilaterally, heart rhythm is regular, bradycardic, no neck vein dilation, or lower extremity edema.    Blood pressure today was initially elevated at 168/72, follow-up reading reduced to 138/80.  He reports home pressures are consistent in the 120s systolic.  Follow up BMP showed a sodium of 141, potassium 4.6, BUN 20, Creatinine 1.21, and GFR 57  Lipid profile is excellent showing a total cholesterol of 135, HDL 50, LDL 65, and , managed on low dose atorvastatin.   BMI 34.85    Activity is limited due to back pain and bilateral knee replacements.  He plans to travel to [place] at the end of this month where he will spend the winter months.  He plans to use the pool for exercise.    He is currently on the Atkins diet  Sleeping well, does not use CPAP.  Remains compliant with all medications.                   Assessment and Plan:       1. Hypertension -well-controlled on current medical therapy  2. CAD -no  ischemic symptoms.  Remote history of RCA and LAD stenting, angioplasty of the first diagonal in 2005.  Continue on propranolol, losartan, aspirin, Spironolactone, and statin.  Encourage exercise, weight loss, and heart healthy diet.  3. Hyperlipidemia -LDL at goal 65, continue on atorvastatin  4. Obesity -encourage weight loss.  Written material for heart healthy diet provided.  5. LIZANDRO -mild.  Does not use CPAP.                Thank you for allowing me to participate in this delightful patient's care. I have recommended he follow up in 7 months upon return from [place] with Dr. Allred..       Aurora Linton, APRN CNP         Review of Systems:     Review of Systems:  Skin:  Negative     Eyes:  Positive for glasses  ENT:  Negative    Respiratory:  Negative    Cardiovascular:  Negative    Gastroenterology: Negative    Genitourinary:  Negative    Musculoskeletal:  Positive for joint pain;arthritis  Neurologic:  Negative    Psychiatric:  Negative    Heme/Lymph/Imm:  Negative    Endocrine:  Negative                Physical Exam:     GEN:  In general, this is a overweight male in no acute distress.  HEENT:  Pupils equal, round. Sclerae nonicteric. Clear oropharynx. Mucous membranes moist.  NECK: Supple, no masses appreciated. Trachea midline.  No JVD   C/V:  Regular rate and rhythm, no murmur, rub or gallop. No S3 or RV heave.   RESP: Respirations are unlabored. No use of accessory muscles. Clear to auscultation bilaterally without wheezing, rales, or rhonchi.  GI: Abdomen soft, nontender, nondistended. No HSM appreciated.   EXTREM: No LE edema. No cyanosis or clubbing.  NEURO: Alert and oriented, cooperative. Gait steady. No obvious focal deficits.   PSYCH: Normal affect.  SKIN: Warm and dry. No rashes or petechiae appreciated.          Past Medical History:     Past Medical History:   Diagnosis Date     Coronary atherosclerosis of unspecified type of vessel, native or graft 8/01    cardiac cath 2005: GER to LAD;  cath 2002: GER to RCA     Essential hypertension, benign     INTERMITTENT HYPERTENSION     Essential tremor      Generalized osteoarthrosis, unspecified site      Hyperlipidaemia      Hypertrophy of prostate without urinary obstruction and other lower urinary tract symptoms (LUTS)     BPH - Dr Pinto     Impotence of organic origin     Dr Pinto - Dr Allred     NONSPECIFIC MEDICAL HISTORY     CERVICAL/LUMBAR RADICULOPATHY     NONSPECIFIC MEDICAL HISTORY     SHOULDER IMPINGEMENT     NSTEMI (non-ST elevated myocardial infarction) (H)     inferior 2001     Obese      Other and unspecified hyperlipidemia     ?     Peripheral neuropathy               Past Surgical History:     Past Surgical History:   Procedure Laterality Date     C TOTAL KNEE ARTHROPLASTY  7/04    Knee Replacement, Total - LEFT Dr Regan     CARDIAC NUC DANISHA STRESS TEST NL  4/09    normal     COLONOSCOPY  6/06    normal - diverticulosis - dr adams     COLONOSCOPY  8/14/2012    Procedure: COLONOSCOPY;  COLONOSCOPY SCREENING/ 6 YEAR FOLLOW UP;  Surgeon: Rey Adams MD;  Location:  GI     HC REPAIR UMBILICAL CHIKA,5+Y/O,REDUC  9/04    dr dominique     SURGICAL HISTORY OF -   1990    S/P CERVICAL DISCECTOMY x 2     SURGICAL HISTORY OF -   1990    S/P LUMBAR DISCECTOMY x 2     SURGICAL HISTORY OF -   1993    S/P LT CARPAL TUNNEL SURG/SHOULDER IMPINGEMENT     SURGICAL HISTORY OF -   8/01    S/P STENT OF RT CORONARY ARTERY     SURGICAL HISTORY OF -   9/05    Drug eluting stent to LAD              Allergies:   Lisinopril and Morphine       Data:   All laboratory data reviewed:    LAST CHOLESTEROL:  Lab Results   Component Value Date    CHOL 135 09/09/2020     Lab Results   Component Value Date    HDL 50 09/09/2020     Lab Results   Component Value Date    LDL 65 09/09/2020     Lab Results   Component Value Date    TRIG 102 09/09/2020     Lab Results   Component Value Date    CHOLHDLRATIO 2.6 05/20/2014       LAST BMP:  Lab Results   Component Value  Date     10/07/2020      Lab Results   Component Value Date    POTASSIUM 4.6 10/07/2020     Lab Results   Component Value Date    CHLORIDE 105 10/07/2020     Lab Results   Component Value Date    EFREM 9.3 10/07/2020     Lab Results   Component Value Date    CO2 29 10/07/2020     Lab Results   Component Value Date    BUN 20 10/07/2020     Lab Results   Component Value Date    CR 1.21 10/07/2020     Lab Results   Component Value Date     10/07/2020       LAST CBC:  Lab Results   Component Value Date    WBC 4.21 09/18/2019     Lab Results   Component Value Date    RBC 3.97 09/18/2019     Lab Results   Component Value Date    HGB 12.9 09/18/2019     Lab Results   Component Value Date    HCT 38.9 09/18/2019     Lab Results   Component Value Date    MCV 98 09/18/2019     Lab Results   Component Value Date    MCH 32.5 09/18/2019     Lab Results   Component Value Date    MCHC 33.2 09/18/2019     Lab Results   Component Value Date    RDW 12.2 09/18/2019     Lab Results   Component Value Date     09/18/2019

## 2021-02-08 ENCOUNTER — NEW PATIENT (OUTPATIENT)
Dept: URBAN - METROPOLITAN AREA CLINIC 24 | Facility: CLINIC | Age: 81
End: 2021-02-08
Payer: COMMERCIAL

## 2021-02-08 DIAGNOSIS — H25.813 COMBINED FORMS OF AGE-RELATED CATARACT, BILATERAL: Primary | ICD-10-CM

## 2021-02-08 DIAGNOSIS — H43.813 VITREOUS DEGENERATION, BILATERAL: ICD-10-CM

## 2021-02-08 DIAGNOSIS — H50.15 ALTERNATING EXOTROPIA: ICD-10-CM

## 2021-02-08 PROCEDURE — 99204 OFFICE O/P NEW MOD 45 MIN: CPT | Performed by: OPTOMETRIST

## 2021-02-08 PROCEDURE — 92134 CPTRZ OPH DX IMG PST SGM RTA: CPT | Performed by: OPTOMETRIST

## 2021-02-08 ASSESSMENT — VISUAL ACUITY
OS: 20/20
OD: 20/20

## 2021-02-08 ASSESSMENT — INTRAOCULAR PRESSURE
OS: 12
OD: 12

## 2021-02-08 ASSESSMENT — KERATOMETRY
OS: 42.71
OD: 43.30

## 2021-04-19 ENCOUNTER — TRANSFERRED RECORDS (OUTPATIENT)
Dept: HEALTH INFORMATION MANAGEMENT | Facility: CLINIC | Age: 81
End: 2021-04-19

## 2021-04-19 LAB
ALBUMIN SERPL-MCNC: 4 G/DL
ALP SERPL-CCNC: 55 U/L
ALT SERPL-CCNC: 28 U/L
ANION GAP SERPL CALCULATED.3IONS-SCNC: ABNORMAL MMOL/L
AST SERPL-CCNC: 28 U/L
BILIRUB SERPL-MCNC: 0.4 MG/DL
BUN SERPL-MCNC: 17 MG/DL
CALCIUM SERPL-MCNC: 9.2 MG/DL
CHLORIDE SERPLBLD-SCNC: ABNORMAL MMOL/L
CHOLEST SERPL-MCNC: 151 MG/DL
CO2 SERPL-SCNC: ABNORMAL MMOL/L
CREAT SERPL-MCNC: 0.9 MG/DL
ERYTHROCYTE [DISTWIDTH] IN BLOOD BY AUTOMATED COUNT: NORMAL %
GFR SERPL CREATININE-BSD FRML MDRD: 81 ML/MIN/1.73M2
GLUCOSE SERPL-MCNC: 103 MG/DL (ref 70–105)
HCT VFR BLD AUTO: 41.5 %
HDLC SERPL-MCNC: 48 MG/DL
HEMOGLOBIN: 13.7 G/DL (ref 13.5–17.9)
LDLC SERPL CALC-MCNC: 84 MG/DL
MCH RBC QN AUTO: NORMAL PG
MCHC RBC AUTO-ENTMCNC: NORMAL G/DL
MCV RBC AUTO: NORMAL FL
NONHDLC SERPL-MCNC: NORMAL MG/DL
PLATELET # BLD AUTO: 161 10^9/L
POTASSIUM SERPL-SCNC: 4.7 MMOL/L
PROT SERPL-MCNC: 6.9 G/DL
RBC # BLD AUTO: NORMAL 10*6/UL
SODIUM SERPL-SCNC: 141 MMOL/L
TRIGL SERPL-MCNC: 95 MG/DL
WBC # BLD AUTO: 5.01 10^9/L

## 2021-05-11 ENCOUNTER — PRE VISIT (OUTPATIENT)
Dept: CARDIOLOGY | Facility: CLINIC | Age: 81
End: 2021-05-11

## 2021-05-19 ENCOUNTER — OFFICE VISIT (OUTPATIENT)
Dept: CARDIOLOGY | Facility: CLINIC | Age: 81
End: 2021-05-19
Attending: NURSE PRACTITIONER
Payer: COMMERCIAL

## 2021-05-19 VITALS
DIASTOLIC BLOOD PRESSURE: 81 MMHG | BODY MASS INDEX: 37.18 KG/M2 | WEIGHT: 245.3 LBS | HEART RATE: 51 BPM | HEIGHT: 68 IN | SYSTOLIC BLOOD PRESSURE: 134 MMHG

## 2021-05-19 DIAGNOSIS — I10 ESSENTIAL HYPERTENSION, BENIGN: ICD-10-CM

## 2021-05-19 DIAGNOSIS — E78.00 PURE HYPERCHOLESTEROLEMIA: Primary | Chronic | ICD-10-CM

## 2021-05-19 DIAGNOSIS — I25.10 CORONARY ARTERY DISEASE INVOLVING NATIVE CORONARY ARTERY OF NATIVE HEART WITHOUT ANGINA PECTORIS: ICD-10-CM

## 2021-05-19 DIAGNOSIS — E66.09 NON MORBID OBESITY DUE TO EXCESS CALORIES: ICD-10-CM

## 2021-05-19 DIAGNOSIS — N18.31 CHRONIC RENAL FAILURE, STAGE 3A (H): ICD-10-CM

## 2021-05-19 PROCEDURE — 99214 OFFICE O/P EST MOD 30 MIN: CPT | Performed by: INTERNAL MEDICINE

## 2021-05-19 RX ORDER — PROPRANOLOL HYDROCHLORIDE 120 MG/1
120 CAPSULE, EXTENDED RELEASE ORAL DAILY
COMMUNITY

## 2021-05-19 RX ORDER — FINASTERIDE 5 MG/1
5 TABLET, FILM COATED ORAL DAILY
COMMUNITY

## 2021-05-19 RX ORDER — DOCUSATE SODIUM 100 MG/1
100 CAPSULE, LIQUID FILLED ORAL 2 TIMES DAILY PRN
COMMUNITY

## 2021-05-19 RX ORDER — ATORVASTATIN CALCIUM 40 MG/1
40 TABLET, FILM COATED ORAL DAILY
Qty: 90 TABLET | Refills: 3 | Status: SHIPPED | OUTPATIENT
Start: 2021-05-19 | End: 2024-05-01

## 2021-05-19 RX ORDER — CELECOXIB 100 MG/1
100 CAPSULE ORAL 2 TIMES DAILY
Status: ON HOLD | COMMUNITY
End: 2024-05-09

## 2021-05-19 RX ORDER — PROPRANOLOL HYDROCHLORIDE 10 MG/1
10 TABLET ORAL DAILY PRN
COMMUNITY
End: 2024-05-01

## 2021-05-19 RX ORDER — SODIUM FLUORIDE 5 MG/G
GEL, DENTIFRICE DENTAL AT BEDTIME
COMMUNITY

## 2021-05-19 ASSESSMENT — MIFFLIN-ST. JEOR: SCORE: 1787.17

## 2021-05-19 NOTE — LETTER
2021    [facility]  [address]  [facility] [address]    RE: Tony Bruce       Dear Colleague,    I had the pleasure of seeing Tony Bruce in the [facility].    HPI and Plan:   See dictation    Orders Placed This Encounter   Procedures     Lipid Profile     ALT     Basic metabolic panel     Follow-Up with Cardiologist       Orders Placed This Encounter   Medications     finasteride (PROSCAR) 5 MG tablet     Sig: Take 5 mg by mouth daily     celecoxib (CELEBREX) 100 MG capsule     Sig: Take 100 mg by mouth 2 times daily     acetaminophen 500 MG CAPS     Si tablets twice a day     docusate sodium (COLACE) 100 MG capsule     Sig: Take 100 mg by mouth 2 times daily as needed for constipation     propranolol ER (INDERAL LA) 120 MG 24 hr capsule     Sig: Take 120 mg by mouth daily     propranolol (INDERAL) 10 MG tablet     Sig: Take 10 mg by mouth daily as needed     sodium fluoride dental gel (PREVIDENT) 1.1 % GEL topical gel     Sig: Apply to affected area At Bedtime     atorvastatin (LIPITOR) 40 MG tablet     Sig: Take 1 tablet (40 mg) by mouth daily     Dispense:  90 tablet     Refill:  3       Medications Discontinued During This Encounter   Medication Reason     propranolol HCl 60 MG TABS Dose adjustment     atorvastatin (LIPITOR) 20 MG tablet          Encounter Diagnoses   Name Primary?     Coronary artery disease involving native coronary artery of native heart without angina pectoris      Essential hypertension, benign      Pure hypercholesterolemia Yes     Non morbid obesity due to excess calories      Chronic renal failure, stage 3a        CURRENT MEDICATIONS:  Current Outpatient Medications   Medication Sig Dispense Refill     acetaminophen 500 MG CAPS 2 tablets twice a day       amoxicillin (AMOXIL) 500 MG capsule Take 500 mg by mouth 3 times daily Before dental work       aspirin 81 MG tablet Take 81 mg by mouth  daily       atorvastatin (LIPITOR) 40 MG tablet Take 1 tablet (40 mg) by mouth daily 90 tablet 3     Calcium Carb-Cholecalciferol (CALCIUM-VITAMIN D3) 250-125 MG-UNIT TABS Take 2 tablets by mouth 2 times daily       celecoxib (CELEBREX) 100 MG capsule Take 100 mg by mouth 2 times daily       cyanocobalamin (VITAMIN B-12) 1000 MCG tablet Take by mouth daily       diclofenac (VOLTAREN) 1 % GEL topical gel Place onto the skin as needed for moderate pain       docusate sodium (COLACE) 100 MG capsule Take 100 mg by mouth 2 times daily as needed for constipation       Emollient (VANICREAM EX) APPLY THIN LAYER    TWICE A DAY FOR DRY SKIN       finasteride (PROSCAR) 5 MG tablet Take 5 mg by mouth daily       lidocaine (LIDODERM) 5 % Patch Place 1 patch onto the skin       losartan (COZAAR) 50 MG tablet Take 1 tablet (50 mg) by mouth daily 90 tablet 3     omega 3 1000 MG CAPS Take by mouth daily       polyethylene glycol (MIRALAX/GLYCOLAX) Packet Take 1 packet by mouth as needed for constipation       primidone (MYSOLINE) 250 MG tablet Take 250 mg by mouth 2 times daily       propranolol (INDERAL) 10 MG tablet Take 10 mg by mouth daily as needed       propranolol ER (INDERAL LA) 120 MG 24 hr capsule Take 120 mg by mouth daily       psyllium 0.52 g capsule Take 1 capsule by mouth daily       sodium fluoride dental gel (PREVIDENT) 1.1 % GEL topical gel Apply to affected area At Bedtime       spironolactone (ALDACTONE) 25 MG tablet Take 1 tablet (25 mg) by mouth daily 90 tablet 3     tamsulosin (FLOMAX) 0.4 MG capsule Take 0.4 mg by mouth daily       Vitamin D, Cholecalciferol, 1000 units CAPS Take by mouth daily       hydrocortisone 2.5 % cream Apply topically 2 times daily       ibuprofen (ADVIL/MOTRIN) 600 MG tablet Take 600 mg by mouth 2 times daily       methocarbamol (ROBAXIN) 750 MG tablet Take 750 mg by mouth 3 times daily as needed for muscle spasms       traMADol (ULTRAM) 50 MG tablet TAKE ONE TABLET BY MOUTH TWICE A  DAY AS NEEDED FOR LOW BACK PAIN       Urea 40 % CREA        valACYclovir (VALTREX) 1000 mg tablet TAKE TWO TABLETS BY MOUTH TWICE A DAY         ALLERGIES     Allergies   Allergen Reactions     Lisinopril      Morphine      confusion       PAST MEDICAL HISTORY:  Past Medical History:   Diagnosis Date     Coronary atherosclerosis of unspecified type of vessel, native or graft 8/01    cardiac cath 2005: GER to LAD; cath 2002: GER to RCA     Essential hypertension, benign     INTERMITTENT HYPERTENSION     Essential tremor      Generalized osteoarthrosis, unspecified site      Hyperlipidaemia      Hypertrophy of prostate without urinary obstruction and other lower urinary tract symptoms (LUTS)     BPH - Dr Pinto     Impotence of organic origin     Dr Pinto - Dr Allred     NONSPECIFIC MEDICAL HISTORY     CERVICAL/LUMBAR RADICULOPATHY     NONSPECIFIC MEDICAL HISTORY     SHOULDER IMPINGEMENT     NSTEMI (non-ST elevated myocardial infarction) (H)     inferior 2001     Obese      Other and unspecified hyperlipidemia     ?     Peripheral neuropathy        PAST SURGICAL HISTORY:  Past Surgical History:   Procedure Laterality Date     C TOTAL KNEE ARTHROPLASTY  7/04    Knee Replacement, Total - LEFT Dr Regan     CARDIAC NUC DANISHA STRESS TEST NL  4/09    normal     COLONOSCOPY  6/06    normal - diverticulosis - dr adams     COLONOSCOPY  8/14/2012    Procedure: COLONOSCOPY;  COLONOSCOPY SCREENING/ 6 YEAR FOLLOW UP;  Surgeon: Rey Adams MD;  Location:  GI     HC REPAIR UMBILICAL CHIKA,5+Y/O,REDUC  9/04    dr dominique     SURGICAL HISTORY OF -   1990    S/P CERVICAL DISCECTOMY x 2     SURGICAL HISTORY OF -   1990    S/P LUMBAR DISCECTOMY x 2     SURGICAL HISTORY OF -   1993    S/P LT CARPAL TUNNEL SURG/SHOULDER IMPINGEMENT     SURGICAL HISTORY OF -   8/01    S/P STENT OF RT CORONARY ARTERY     SURGICAL HISTORY OF -   9/05    Drug eluting stent to LAD       FAMILY HISTORY:  Family History   Problem Relation Age of  Onset     Respiratory Father         COPD     Cerebrovascular Disease Mother         CVA     C.A.MIKE. Brother         CABG - after CABG x 3 - rheumatic fever as a child     Breast Cancer Sister      Cerebrovascular Disease Sister        SOCIAL HISTORY:  Social History     Socioeconomic History     Marital status:      Spouse name: paola     Number of children: 4     Years of education: 14     Highest education level: None   Occupational History     None   Social Needs     Financial resource strain: None     Food insecurity     Worry: None     Inability: None     Transportation needs     Medical: None     Non-medical: None   Tobacco Use     Smoking status: Never Smoker     Smokeless tobacco: Never Used   Substance and Sexual Activity     Alcohol use: No     Drug use: No     Sexual activity: Yes     Partners: Female   Lifestyle     Physical activity     Days per week: None     Minutes per session: None     Stress: None   Relationships     Social connections     Talks on phone: None     Gets together: None     Attends Anabaptism service: None     Active member of club or organization: None     Attends meetings of clubs or organizations: None     Relationship status: None     Intimate partner violence     Fear of current or ex partner: None     Emotionally abused: None     Physically abused: None     Forced sexual activity: None   Other Topics Concern      Service Not Asked     Blood Transfusions Not Asked     Caffeine Concern Yes     Comment: 3-4 cups qd     Occupational Exposure Not Asked     Hobby Hazards Not Asked     Sleep Concern Not Asked     Stress Concern Not Asked     Weight Concern Not Asked     Special Diet Not Asked     Back Care Not Asked     Exercise Yes     Comment: limited     Bike Helmet Not Asked     Seat Belt Yes     Self-Exams Not Asked     Parent/sibling w/ CABG, MI or angioplasty before 65F 55M? Not Asked   Social History Narrative     None       Review of Systems:  Skin:  Positive  "for bruising     Eyes:  Positive for glasses    ENT:  Negative      Respiratory:  Negative       Cardiovascular:  Negative;palpitations;chest pain;edema;cyanosis;lightheadedness;dizziness;fatigue;syncope or near-syncope fatigue    Gastroenterology: Positive for heartburn    Genitourinary:  Positive for prostate problem    Musculoskeletal:  Positive for neck pain;joint pain;back pain    Neurologic:  Positive for tremors    Psychiatric:  Negative      Heme/Lymph/Imm:  Positive for easy bruising    Endocrine:  Negative        Physical Exam:  Vitals: /81   Pulse 51   Ht 1.727 m (5' 8\")   Wt 111.3 kg (245 lb 4.8 oz)   BMI 37.30 kg/m      Constitutional:  cooperative, alert and oriented, well developed, well nourished, in no acute distress obese      Skin:  warm and dry to the touch, no apparent skin lesions or masses noted          Head:  normocephalic, no masses or lesions        Eyes:  pupils equal and round, conjunctivae and lids unremarkable, sclera white, no xanthalasma, EOMS intact, no nystagmus        Lymph:      ENT:  no pallor or cyanosis, dentition good        Neck:  carotid pulses are full and equal bilaterally        Respiratory:  normal breath sounds, clear to auscultation, normal A-P diameter, normal symmetry, normal respiratory excursion, no use of accessory muscles         Cardiac: regular rhythm;normal S1 and S2;no S3 or S4       systolic murmur        pulses full and equal                                        GI:    obese      Extremities and Muscular Skeletal:  no edema;no spinal abnormalities noted;normal muscle strength and tone              Neurological:  no gross motor deficits        Psych:  affect appropriate, oriented to time, person and place      Thank you for allowing me to participate in the care of your patient.      Sincerely,     Nabeel Allred MD     [facility]    cc:   Aurora Linton, APRN CNP  [address] " [place] [address]

## 2021-05-19 NOTE — PROGRESS NOTES
Service Date: 05/19/2021    CLINIC VISIT    HISTORY OF PRESENT ILLNESS:  Tony is a very nice gentleman who will be 81 tomorrow.  His past medical history is significant for labile hypertension, hypercholesterolemia, central obesity, inferior wall myocardial infarction with stenting of his right coronary artery in 2001, stenting of his left anterior descending artery because of unstable angina in 2005 with rescue angioplasty of his first diagonal.    Tony returns to clinic today.  He states both he and his wife did have COVID last year but now are both vaccinated.  He states he has not had any chest, arm, neck, jaw or shoulder discomfort but during COVID, he was having severe [facility] and has not been exercising and unfortunately, has gained a significant amount of weight.    He does have white-coat hypertension which we have been able to get a handle on by adding a diuretic, ultimately switching to spironolactone because of his problems with hypokalemia with hydrochlorothiazide.  He brings in a long list of blood pressures showing that for the vast majority of it, he is in the 110s to 120s at home.  He has problems when he goes to the dentist.  His blood pressure may go as high as 190 for which we have had him double up on his losartan.    ASSESSMENT AND PLAN:  Tony appears to be doing well from a cardiac standpoint without clinical evidence of ischemia, heart failure or significant arrhythmia, although he has become quite sedentary and his symptoms may not be as reliable.    Blood pressure today in the office is okay at 134/81 with a pulse of 51 but as stated, his blood pressures recorded at home are ideal.    Weight unfortunately is 245 pounds, up 16 pounds from last fall, giving a body mass index of 37.3.  I emphasized the importance of getting back to his exercise regimen and losing weight, as both of these parameters will effect blood pressure, cholesterol and overall  cardiovascular fitness, not to mention how it may be contributing to his chronic back problems.    Cholesterol profile, as expected, did backslide given his inactivity and weight gain.  Cholesterol is up to 135, HDL is 50, LDL is 65 whereas it had been 50, triglycerides are 102.  At this time, I am going to increase his atorvastatin from 20 to 40 to try to drive the LDL lower.  We had some significant discussion about this and we talked about the LDL hypothesis, the IMPROVE-IT trial, ODYSSEY and FOURIER trials and I think we need to drive his LDL lower given his history of coronary artery disease and the fact that he is now sedentary and gaining weight.  I will recheck a fasting lipid profile and liver function tests in 1 month.  Otherwise, we will see him back in the fall before he heads to [place].    Basic metabolic profile does show that his creatinine has bumped up a bit at 1.2, giving a GFR of 57.  Electrolytes are normal with a potassium of 4.6.  We will continue his antihypertensive regimen as is.  We reviewed his medications.  We will continue them all as is other than his change in his statin.    Thank you for allowing me to participate in his care.  Over 30 minutes were spent with Tony and his wife discussing his various issues.    Nabeel Allred MD, FACC    Nabeel Allred MD, FACC        D: 2021   T: 2021   MT: miracle    Name:     TONY PAYNE  MRN:      [id number]        Account:      [id number]   :      1940           Service Date: 2021       Document: [id number]

## 2021-05-19 NOTE — PROGRESS NOTES
HPI and Plan:   See dictation    Orders Placed This Encounter   Procedures     Lipid Profile     ALT     Basic metabolic panel     Follow-Up with Cardiologist       Orders Placed This Encounter   Medications     finasteride (PROSCAR) 5 MG tablet     Sig: Take 5 mg by mouth daily     celecoxib (CELEBREX) 100 MG capsule     Sig: Take 100 mg by mouth 2 times daily     acetaminophen 500 MG CAPS     Si tablets twice a day     docusate sodium (COLACE) 100 MG capsule     Sig: Take 100 mg by mouth 2 times daily as needed for constipation     propranolol ER (INDERAL LA) 120 MG 24 hr capsule     Sig: Take 120 mg by mouth daily     propranolol (INDERAL) 10 MG tablet     Sig: Take 10 mg by mouth daily as needed     sodium fluoride dental gel (PREVIDENT) 1.1 % GEL topical gel     Sig: Apply to affected area At Bedtime     atorvastatin (LIPITOR) 40 MG tablet     Sig: Take 1 tablet (40 mg) by mouth daily     Dispense:  90 tablet     Refill:  3       Medications Discontinued During This Encounter   Medication Reason     propranolol HCl 60 MG TABS Dose adjustment     atorvastatin (LIPITOR) 20 MG tablet          Encounter Diagnoses   Name Primary?     Coronary artery disease involving native coronary artery of native heart without angina pectoris      Essential hypertension, benign      Pure hypercholesterolemia Yes     Non morbid obesity due to excess calories      Chronic renal failure, stage 3a        CURRENT MEDICATIONS:  Current Outpatient Medications   Medication Sig Dispense Refill     acetaminophen 500 MG CAPS 2 tablets twice a day       amoxicillin (AMOXIL) 500 MG capsule Take 500 mg by mouth 3 times daily Before dental work       aspirin 81 MG tablet Take 81 mg by mouth daily       atorvastatin (LIPITOR) 40 MG tablet Take 1 tablet (40 mg) by mouth daily 90 tablet 3     Calcium Carb-Cholecalciferol (CALCIUM-VITAMIN D3) 250-125 MG-UNIT TABS Take 2 tablets by mouth 2 times daily       celecoxib (CELEBREX) 100 MG capsule  Take 100 mg by mouth 2 times daily       cyanocobalamin (VITAMIN B-12) 1000 MCG tablet Take by mouth daily       diclofenac (VOLTAREN) 1 % GEL topical gel Place onto the skin as needed for moderate pain       docusate sodium (COLACE) 100 MG capsule Take 100 mg by mouth 2 times daily as needed for constipation       Emollient (VANICREAM EX) APPLY THIN LAYER    TWICE A DAY FOR DRY SKIN       finasteride (PROSCAR) 5 MG tablet Take 5 mg by mouth daily       lidocaine (LIDODERM) 5 % Patch Place 1 patch onto the skin       losartan (COZAAR) 50 MG tablet Take 1 tablet (50 mg) by mouth daily 90 tablet 3     omega 3 1000 MG CAPS Take by mouth daily       polyethylene glycol (MIRALAX/GLYCOLAX) Packet Take 1 packet by mouth as needed for constipation       primidone (MYSOLINE) 250 MG tablet Take 250 mg by mouth 2 times daily       propranolol (INDERAL) 10 MG tablet Take 10 mg by mouth daily as needed       propranolol ER (INDERAL LA) 120 MG 24 hr capsule Take 120 mg by mouth daily       psyllium 0.52 g capsule Take 1 capsule by mouth daily       sodium fluoride dental gel (PREVIDENT) 1.1 % GEL topical gel Apply to affected area At Bedtime       spironolactone (ALDACTONE) 25 MG tablet Take 1 tablet (25 mg) by mouth daily 90 tablet 3     tamsulosin (FLOMAX) 0.4 MG capsule Take 0.4 mg by mouth daily       Vitamin D, Cholecalciferol, 1000 units CAPS Take by mouth daily       hydrocortisone 2.5 % cream Apply topically 2 times daily       ibuprofen (ADVIL/MOTRIN) 600 MG tablet Take 600 mg by mouth 2 times daily       methocarbamol (ROBAXIN) 750 MG tablet Take 750 mg by mouth 3 times daily as needed for muscle spasms       traMADol (ULTRAM) 50 MG tablet TAKE ONE TABLET BY MOUTH TWICE A DAY AS NEEDED FOR LOW BACK PAIN       Urea 40 % CREA        valACYclovir (VALTREX) 1000 mg tablet TAKE TWO TABLETS BY MOUTH TWICE A DAY         ALLERGIES     Allergies   Allergen Reactions     Lisinopril      Morphine      confusion       PAST MEDICAL  HISTORY:  Past Medical History:   Diagnosis Date     Coronary atherosclerosis of unspecified type of vessel, native or graft 8/01    cardiac cath 2005: GER to LAD; cath 2002: GER to RCA     Essential hypertension, benign     INTERMITTENT HYPERTENSION     Essential tremor      Generalized osteoarthrosis, unspecified site      Hyperlipidaemia      Hypertrophy of prostate without urinary obstruction and other lower urinary tract symptoms (LUTS)     BPH - Dr Pinto     Impotence of organic origin     Dr Pinto - Dr Allred     NONSPECIFIC MEDICAL HISTORY     CERVICAL/LUMBAR RADICULOPATHY     NONSPECIFIC MEDICAL HISTORY     SHOULDER IMPINGEMENT     NSTEMI (non-ST elevated myocardial infarction) (H)     inferior 2001     Obese      Other and unspecified hyperlipidemia     ?     Peripheral neuropathy        PAST SURGICAL HISTORY:  Past Surgical History:   Procedure Laterality Date     C TOTAL KNEE ARTHROPLASTY  7/04    Knee Replacement, Total - LEFT Dr Regan     CARDIAC NUC DANISHA STRESS TEST NL  4/09    normal     COLONOSCOPY  6/06    normal - diverticulosis - dr adams     COLONOSCOPY  8/14/2012    Procedure: COLONOSCOPY;  COLONOSCOPY SCREENING/ 6 YEAR FOLLOW UP;  Surgeon: Rey Adams MD;  Location: [facility]     HC REPAIR UMBILICAL CHIKA,5+Y/O,REDUC  9/04    dr dominique     SURGICAL HISTORY OF -   1990    S/P CERVICAL DISCECTOMY x 2     SURGICAL HISTORY OF -   1990    S/P LUMBAR DISCECTOMY x 2     SURGICAL HISTORY OF -   1993    S/P LT CARPAL TUNNEL SURG/SHOULDER IMPINGEMENT     SURGICAL HISTORY OF -   8/01    S/P STENT OF RT CORONARY ARTERY     SURGICAL HISTORY OF -   9/05    Drug eluting stent to LAD       FAMILY HISTORY:  Family History   Problem Relation Age of Onset     Respiratory Father         COPD     Cerebrovascular Disease Mother         CVA     C.A.D. Brother         CABG - after CABG x 3 - rheumatic fever as a child     Breast Cancer Sister      Cerebrovascular Disease Sister        SOCIAL  HISTORY:  Social History     Socioeconomic History     Marital status:      Spouse name: paola     Number of children: 4     Years of education: 14     Highest education level: None   Occupational History     None   Social Needs     Financial resource strain: None     Food insecurity     Worry: None     Inability: None     Transportation needs     Medical: None     Non-medical: None   Tobacco Use     Smoking status: Never Smoker     Smokeless tobacco: Never Used   Substance and Sexual Activity     Alcohol use: No     Drug use: No     Sexual activity: Yes     Partners: Female   Lifestyle     Physical activity     Days per week: None     Minutes per session: None     Stress: None   Relationships     Social connections     Talks on phone: None     Gets together: None     Attends Taoism service: None     Active member of club or organization: None     Attends meetings of clubs or organizations: None     Relationship status: None     Intimate partner violence     Fear of current or ex partner: None     Emotionally abused: None     Physically abused: None     Forced sexual activity: None   Other Topics Concern      Service Not Asked     Blood Transfusions Not Asked     Caffeine Concern Yes     Comment: 3-4 cups qd     Occupational Exposure Not Asked     Hobby Hazards Not Asked     Sleep Concern Not Asked     Stress Concern Not Asked     Weight Concern Not Asked     Special Diet Not Asked     Back Care Not Asked     Exercise Yes     Comment: limited     Bike Helmet Not Asked     Seat Belt Yes     Self-Exams Not Asked     Parent/sibling w/ CABG, MI or angioplasty before 65F 55M? Not Asked   Social History Narrative     None       Review of Systems:  Skin:  Positive for bruising     Eyes:  Positive for glasses    ENT:  Negative      Respiratory:  Negative       Cardiovascular:  Negative;palpitations;chest pain;edema;cyanosis;lightheadedness;dizziness;fatigue;syncope or near-syncope fatigue   "  Gastroenterology: Positive for heartburn    Genitourinary:  Positive for prostate problem    Musculoskeletal:  Positive for neck pain;joint pain;back pain    Neurologic:  Positive for tremors    Psychiatric:  Negative      Heme/Lymph/Imm:  Positive for easy bruising    Endocrine:  Negative        Physical Exam:  Vitals: /81   Pulse 51   Ht 1.727 m (5' 8\")   Wt 111.3 kg (245 lb 4.8 oz)   BMI 37.30 kg/m      Constitutional:  cooperative, alert and oriented, well developed, well nourished, in no acute distress obese      Skin:  warm and dry to the touch, no apparent skin lesions or masses noted          Head:  normocephalic, no masses or lesions        Eyes:  pupils equal and round, conjunctivae and lids unremarkable, sclera white, no xanthalasma, EOMS intact, no nystagmus        Lymph:      ENT:  no pallor or cyanosis, dentition good        Neck:  carotid pulses are full and equal bilaterally        Respiratory:  normal breath sounds, clear to auscultation, normal A-P diameter, normal symmetry, normal respiratory excursion, no use of accessory muscles         Cardiac: regular rhythm;normal S1 and S2;no S3 or S4       systolic murmur        pulses full and equal                                        GI:    obese      Extremities and Muscular Skeletal:  no edema;no spinal abnormalities noted;normal muscle strength and tone              Neurological:  no gross motor deficits        Psych:  affect appropriate, oriented to time, person and place        TYSON Azar CNP  [address] [place] [address]              "

## 2021-06-03 ENCOUNTER — TELEPHONE (OUTPATIENT)
Dept: CARDIOLOGY | Facility: CLINIC | Age: 81
End: 2021-06-03

## 2021-06-03 NOTE — TELEPHONE ENCOUNTER
Received request for most recent office note and labs. Return faxed Dr. Allred's dictation from 5/19/2021 and labwork done on 4/19/2021 to the [facility] at [phone].

## 2021-06-10 ENCOUNTER — TRANSFERRED RECORDS (OUTPATIENT)
Dept: HEALTH INFORMATION MANAGEMENT | Facility: CLINIC | Age: 81
End: 2021-06-10

## 2021-06-21 DIAGNOSIS — I25.10 CORONARY ARTERY DISEASE INVOLVING NATIVE CORONARY ARTERY OF NATIVE HEART WITHOUT ANGINA PECTORIS: ICD-10-CM

## 2021-06-21 LAB
ALT SERPL W P-5'-P-CCNC: 36 U/L (ref 0–70)
CHOLEST SERPL-MCNC: 144 MG/DL
HDLC SERPL-MCNC: 60 MG/DL
LDLC SERPL CALC-MCNC: 65 MG/DL
NONHDLC SERPL-MCNC: 84 MG/DL
TRIGL SERPL-MCNC: 93 MG/DL

## 2021-06-21 PROCEDURE — 80061 LIPID PANEL: CPT | Performed by: INTERNAL MEDICINE

## 2021-06-21 PROCEDURE — 84460 ALANINE AMINO (ALT) (SGPT): CPT | Performed by: INTERNAL MEDICINE

## 2021-06-21 PROCEDURE — 36415 COLL VENOUS BLD VENIPUNCTURE: CPT | Performed by: INTERNAL MEDICINE

## 2021-07-15 ENCOUNTER — TRANSFERRED RECORDS (OUTPATIENT)
Dept: HEALTH INFORMATION MANAGEMENT | Facility: CLINIC | Age: 81
End: 2021-07-15

## 2021-08-19 ENCOUNTER — PRE VISIT (OUTPATIENT)
Dept: CARDIOLOGY | Facility: CLINIC | Age: 81
End: 2021-08-19

## 2021-08-19 NOTE — TELEPHONE ENCOUNTER
Faxed request for records update to [facility]      8/20/2021 received records from [facility], copy sent to scan. Labs sent to be added to the grid

## 2021-09-08 ENCOUNTER — LAB (OUTPATIENT)
Dept: LAB | Facility: CLINIC | Age: 81
End: 2021-09-08
Payer: COMMERCIAL

## 2021-09-08 DIAGNOSIS — R06.09 DOE (DYSPNEA ON EXERTION): ICD-10-CM

## 2021-09-08 DIAGNOSIS — I25.10 CORONARY ARTERY DISEASE INVOLVING NATIVE CORONARY ARTERY OF NATIVE HEART WITHOUT ANGINA PECTORIS: ICD-10-CM

## 2021-09-08 LAB
ANION GAP SERPL CALCULATED.3IONS-SCNC: 5 MMOL/L (ref 3–14)
BUN SERPL-MCNC: 17 MG/DL (ref 7–30)
CALCIUM SERPL-MCNC: 8.8 MG/DL (ref 8.5–10.1)
CHLORIDE BLD-SCNC: 107 MMOL/L (ref 94–109)
CO2 SERPL-SCNC: 28 MMOL/L (ref 20–32)
CREAT SERPL-MCNC: 0.83 MG/DL (ref 0.66–1.25)
GFR SERPL CREATININE-BSD FRML MDRD: 81 ML/MIN/1.73M2
GLUCOSE BLD-MCNC: 101 MG/DL (ref 70–99)
POTASSIUM BLD-SCNC: 4.5 MMOL/L (ref 3.4–5.3)
SODIUM SERPL-SCNC: 140 MMOL/L (ref 133–144)

## 2021-09-08 PROCEDURE — 83880 ASSAY OF NATRIURETIC PEPTIDE: CPT

## 2021-09-08 PROCEDURE — 80048 BASIC METABOLIC PNL TOTAL CA: CPT

## 2021-09-08 PROCEDURE — 36415 COLL VENOUS BLD VENIPUNCTURE: CPT

## 2021-09-09 ENCOUNTER — OFFICE VISIT (OUTPATIENT)
Dept: CARDIOLOGY | Facility: CLINIC | Age: 81
End: 2021-09-09
Payer: COMMERCIAL

## 2021-09-09 ENCOUNTER — TELEPHONE (OUTPATIENT)
Dept: CARDIOLOGY | Facility: CLINIC | Age: 81
End: 2021-09-09

## 2021-09-09 VITALS
HEIGHT: 68 IN | SYSTOLIC BLOOD PRESSURE: 132 MMHG | DIASTOLIC BLOOD PRESSURE: 74 MMHG | BODY MASS INDEX: 37.33 KG/M2 | WEIGHT: 246.3 LBS | HEART RATE: 59 BPM | OXYGEN SATURATION: 94 %

## 2021-09-09 DIAGNOSIS — I10 ESSENTIAL HYPERTENSION, BENIGN: ICD-10-CM

## 2021-09-09 DIAGNOSIS — E66.09 NON MORBID OBESITY DUE TO EXCESS CALORIES: ICD-10-CM

## 2021-09-09 DIAGNOSIS — E78.00 PURE HYPERCHOLESTEROLEMIA: Chronic | ICD-10-CM

## 2021-09-09 DIAGNOSIS — R06.09 DOE (DYSPNEA ON EXERTION): Primary | ICD-10-CM

## 2021-09-09 DIAGNOSIS — I25.10 CORONARY ARTERY DISEASE INVOLVING NATIVE CORONARY ARTERY OF NATIVE HEART WITHOUT ANGINA PECTORIS: ICD-10-CM

## 2021-09-09 LAB — NT-PROBNP SERPL-MCNC: 113 PG/ML (ref 0–450)

## 2021-09-09 PROCEDURE — 99214 OFFICE O/P EST MOD 30 MIN: CPT | Performed by: INTERNAL MEDICINE

## 2021-09-09 RX ORDER — FOLIC ACID 1 MG/1
1 TABLET ORAL DAILY
Status: ON HOLD | COMMUNITY
End: 2024-05-09

## 2021-09-09 ASSESSMENT — MIFFLIN-ST. JEOR: SCORE: 1786.71

## 2021-09-09 NOTE — PROGRESS NOTES
HPI and Plan:   See dictation    Orders Placed This Encounter   Procedures     N terminal pro BNP outpatient     Basic metabolic panel     Lipid Profile     Follow-Up with Cardiologist     Echocardiogram Complete       Orders Placed This Encounter   Medications     folic acid (FOLVITE) 1 MG tablet     Sig: Take 1 mg by mouth daily       Medications Discontinued During This Encounter   Medication Reason     ibuprofen (ADVIL/MOTRIN) 600 MG tablet Alternate therapy     methocarbamol (ROBAXIN) 750 MG tablet Stopped by Patient         Encounter Diagnoses   Name Primary?     Coronary artery disease involving native coronary artery of native heart without angina pectoris      ÁLVAREZ (dyspnea on exertion) Yes     Essential hypertension, benign      Pure hypercholesterolemia      Non morbid obesity due to excess calories        CURRENT MEDICATIONS:  Current Outpatient Medications   Medication Sig Dispense Refill     acetaminophen 500 MG CAPS 2 tablets twice a day       amoxicillin (AMOXIL) 500 MG capsule Take 500 mg by mouth 3 times daily Before dental work       aspirin 81 MG tablet Take 81 mg by mouth daily       atorvastatin (LIPITOR) 40 MG tablet Take 1 tablet (40 mg) by mouth daily 90 tablet 3     Calcium Carb-Cholecalciferol (CALCIUM-VITAMIN D3) 250-125 MG-UNIT TABS Take 2 tablets by mouth 2 times daily       celecoxib (CELEBREX) 100 MG capsule Take 100 mg by mouth 2 times daily       cyanocobalamin (VITAMIN B-12) 1000 MCG tablet Take by mouth daily       diclofenac (VOLTAREN) 1 % GEL topical gel Place onto the skin as needed for moderate pain       docusate sodium (COLACE) 100 MG capsule Take 100 mg by mouth 2 times daily as needed for constipation       Emollient (VANICREAM EX) APPLY THIN LAYER    TWICE A DAY FOR DRY SKIN       finasteride (PROSCAR) 5 MG tablet Take 5 mg by mouth daily       folic acid (FOLVITE) 1 MG tablet Take 1 mg by mouth daily       hydrocortisone 2.5 % cream Apply topically 2 times daily        lidocaine (LIDODERM) 5 % Patch Place 1 patch onto the skin       losartan (COZAAR) 50 MG tablet Take 1 tablet (50 mg) by mouth daily 90 tablet 3     omega 3 1000 MG CAPS Take by mouth daily       polyethylene glycol (MIRALAX/GLYCOLAX) Packet Take 1 packet by mouth as needed for constipation       primidone (MYSOLINE) 250 MG tablet Take 250 mg by mouth 2 times daily       propranolol (INDERAL) 10 MG tablet Take 10 mg by mouth daily as needed       propranolol ER (INDERAL LA) 120 MG 24 hr capsule Take 60 mg by mouth daily        psyllium 0.52 g capsule Take 1 capsule by mouth daily       sodium fluoride dental gel (PREVIDENT) 1.1 % GEL topical gel Apply to affected area At Bedtime       spironolactone (ALDACTONE) 25 MG tablet Take 1 tablet (25 mg) by mouth daily 90 tablet 3     tamsulosin (FLOMAX) 0.4 MG capsule Take 0.4 mg by mouth daily       traMADol (ULTRAM) 50 MG tablet TAKE ONE TABLET BY MOUTH TWICE A DAY AS NEEDED FOR LOW BACK PAIN       Urea 40 % CREA        Vitamin D, Cholecalciferol, 1000 units CAPS Take by mouth daily       valACYclovir (VALTREX) 1000 mg tablet TAKE TWO TABLETS BY MOUTH TWICE A DAY (Patient not taking: Reported on 9/9/2021)         ALLERGIES     Allergies   Allergen Reactions     Lisinopril      Morphine      confusion       PAST MEDICAL HISTORY:  Past Medical History:   Diagnosis Date     Coronary atherosclerosis of unspecified type of vessel, native or graft 8/01    cardiac cath 2005: GER to LAD; cath 2002: GRE to RCA     Essential hypertension, benign     INTERMITTENT HYPERTENSION     Essential tremor      Generalized osteoarthrosis, unspecified site      Hyperlipidaemia      Hypertrophy of prostate without urinary obstruction and other lower urinary tract symptoms (LUTS)     BPH - Dr Pinto     Impotence of organic origin     Dr Pinto - Dr Allred     NONSPECIFIC MEDICAL HISTORY     CERVICAL/LUMBAR RADICULOPATHY     NONSPECIFIC MEDICAL HISTORY     SHOULDER IMPINGEMENT     NSTEMI  (non-ST elevated myocardial infarction) (H)     inferior 2001     Obese      Other and unspecified hyperlipidemia     ?     Peripheral neuropathy        PAST SURGICAL HISTORY:  Past Surgical History:   Procedure Laterality Date     C TOTAL KNEE ARTHROPLASTY  7/04    Knee Replacement, Total - LEFT Dr Regan     CARDIAC NUC DANISHA STRESS TEST NL  4/09    normal     COLONOSCOPY  6/06    normal - diverticulosis - dr adams     COLONOSCOPY  8/14/2012    Procedure: COLONOSCOPY;  COLONOSCOPY SCREENING/ 6 YEAR FOLLOW UP;  Surgeon: Rey Adams MD;  Location: [facility]     HC REPAIR UMBILICAL CHIKA,5+Y/O,REDUC  9/04    dr dominique     SURGICAL HISTORY OF -   1990    S/P CERVICAL DISCECTOMY x 2     SURGICAL HISTORY OF -   1990    S/P LUMBAR DISCECTOMY x 2     SURGICAL HISTORY OF -   1993    S/P LT CARPAL TUNNEL SURG/SHOULDER IMPINGEMENT     SURGICAL HISTORY OF -   8/01    S/P STENT OF RT CORONARY ARTERY     SURGICAL HISTORY OF -   9/05    Drug eluting stent to LAD       FAMILY HISTORY:  Family History   Problem Relation Age of Onset     Respiratory Father         COPD     Cerebrovascular Disease Mother         CVA     C.A.D. Brother         CABG - after CABG x 3 - rheumatic fever as a child     Breast Cancer Sister      Cerebrovascular Disease Sister        SOCIAL HISTORY:  Social History     Socioeconomic History     Marital status:      Spouse name: paola     Number of children: 4     Years of education: 14     Highest education level: None   Occupational History     None   Tobacco Use     Smoking status: Never Smoker     Smokeless tobacco: Never Used   Substance and Sexual Activity     Alcohol use: No     Drug use: No     Sexual activity: Yes     Partners: Female   Other Topics Concern      Service Not Asked     Blood Transfusions Not Asked     Caffeine Concern Yes     Comment: 3-4 cups qd     Occupational Exposure Not Asked     Hobby Hazards Not Asked     Sleep Concern Not Asked     Stress Concern Not  "Asked     Weight Concern Not Asked     Special Diet Not Asked     Back Care Not Asked     Exercise Yes     Comment: limited     Bike Helmet Not Asked     Seat Belt Yes     Self-Exams Not Asked     Parent/sibling w/ CABG, MI or angioplasty before 65F 55M? Not Asked   Social History Narrative     None     Social Determinants of Health     Financial Resource Strain:      Difficulty of Paying Living Expenses:    Food Insecurity:      Worried About Running Out of Food in the Last Year:      Ran Out of Food in the Last Year:    Transportation Needs:      Lack of Transportation (Medical):      Lack of Transportation (Non-Medical):    Physical Activity:      Days of Exercise per Week:      Minutes of Exercise per Session:    Stress:      Feeling of Stress :    Social Connections:      Frequency of Communication with Friends and Family:      Frequency of Social Gatherings with Friends and Family:      Attends [employer] Services:      Active Member of Clubs or Organizations:      Attends Club or Organization Meetings:      Marital Status:    Intimate Partner Violence:      Fear of Current or Ex-Partner:      Emotionally Abused:      Physically Abused:      Sexually Abused:        Review of Systems:  Skin:  Negative       Eyes:  Negative      ENT:  Negative      Respiratory:  Positive for dyspnea on exertion     Cardiovascular:    Positive for    Gastroenterology: Positive for heartburn    Genitourinary:  Positive for prostate problem    Musculoskeletal:  Positive for neck pain;joint pain;back pain    Neurologic:  Positive for tremors tingling left side of face on occ  Psychiatric:  Negative      Heme/Lymph/Imm:  Positive for easy bruising due to ASA  Endocrine:  Negative        Physical Exam:  Vitals: /74   Pulse 59   Ht 1.727 m (5' 8\")   Wt 111.7 kg (246 lb 4.8 oz)   SpO2 94%   BMI 37.45 kg/m      Constitutional:  cooperative, alert and oriented, well developed, well nourished, in no acute distress obese  "     Skin:  warm and dry to the touch, no apparent skin lesions or masses noted          Head:  normocephalic, no masses or lesions        Eyes:  pupils equal and round, conjunctivae and lids unremarkable, sclera white, no xanthalasma, EOMS intact, no nystagmus        Lymph:      ENT:  no pallor or cyanosis, dentition good        Neck:  carotid pulses are full and equal bilaterally        Respiratory:  normal breath sounds, clear to auscultation, normal A-P diameter, normal symmetry, normal respiratory excursion, no use of accessory muscles         Cardiac: regular rhythm;normal S1 and S2;no S3 or S4       systolic murmur;RUSB;grade 1        pulses full and equal                                        GI:    obese      Extremities and Muscular Skeletal:  no edema;no spinal abnormalities noted;normal muscle strength and tone              Neurological:  no gross motor deficits        Psych:  affect appropriate, oriented to time, person and place        CC  Nabeel Allred MD  [address]  [place] [id number]

## 2021-09-09 NOTE — PROGRESS NOTES
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    Tony and his wife both had COVID last winter, and both are now vaccinated.    He returns to clinic, states he has no chest, arm, neck, jaw or shoulder discomfort but states his activity is mostly limited by his back, so he is not really doing any exercise.  He does note some shortness of breath with activity.  He has no orthopnea or PND.  No problems with peripheral edema.  Overall, he thinks he is doing quite well other than his back problem.    His only other symptom he notes is that he gets some tingling on the left side of his face that occurs intermittently.  He notes no pattern to it.  He states that when he gets it, he will rub his neck.  He does follow with Neurology and has chronic neck problems.  I have told him to discuss this with his neurologist.    Unfortunately, Tony has gained 20 pounds of weight over the last several months.    ASSESSMENT AND PLAN:  Tony appears to be doing well from a cardiac standpoint without clinical evidence of ischemia, unless his dyspnea is an anginal equivalent.  We have not done any testing on Tony for quite some time, so I will start out with an echocardiogram and I will send off an N-terminal proBNP.  It very well could be that his dyspnea on exertion is age, deconditioning and his obesity.  Other possibilities do include an anginal equivalent or congestive heart failure, which we will evaluate further.    Blood pressure is very well controlled at 132/74 with a pulse of 59.    He does not appear to be in overt heart failure or have  significant arrhythmias.    Unfortunately, weight is 246 pounds, giving him a body mass index of 37.5.  We talked about how important it is for him to lose weight.  He got a back injection yesterday, so he is hoping this will help him out and he will be able to walk more.  They are getting ready to go to [place] in October.  He states usually he does a better job of exercising at that time.    Fasting lipid profile with our switch to atorvastatin 40 has resulted in significant improvement in his cholesterol profile.  Total cholesterol is 144, HDL 60, LDL 65, triglycerides are 93.  We will continue with his current dose.  Again, I have encouraged him to exercise regularly and lose the weight, as this will further improve his cholesterol profile.    Basic metabolic profile shows a creatinine of 0.83 with kidney function that is now back in the normal range.  Previously, he had stage IIIA renal failure, but that might have been on blood work drawn when he comes in fasting and might have been a bit prerenal.  Electrolytes are normal, potassium of 4.5, sodium 140.    We reviewed his medications.  We will continue them as is for now, although if his N-terminal proBNP comes up, I may change his diuretic regimen.  Obviously, if his echo is significantly abnormal, we will respond appropriately.  Otherwise, I will plan on seeing him back in 6 months when he returns from [place].    We discussed the vaccine, and I would get the booster as soon as it is available, as he and his wife are both high risk.    Thank you for allowing me to participate in his care.    Over 30 minutes was spent with Tony and his wife today in reviewing the various studies and answering their questions.    Nabeel Allred MD, [facility]        D: 2021   T: 2021   MT: [email or web address]    Name:     TONY PAYNE  MRN:      [id number]        Account:      [id number]   :      1940           Service Date: 2021       Document: [id number]

## 2021-09-09 NOTE — LETTER
9/9/2021    [facility]  [address]  [place] [address]    RE: Tony Bruce       Dear Colleague,    I had the pleasure of seeing Tony Bruce in the [facility].    HPI and Plan:   See dictation    Orders Placed This Encounter   Procedures     N terminal pro BNP outpatient     Basic metabolic panel     Lipid Profile     Follow-Up with Cardiologist     Echocardiogram Complete       Orders Placed This Encounter   Medications     folic acid (FOLVITE) 1 MG tablet     Sig: Take 1 mg by mouth daily       Medications Discontinued During This Encounter   Medication Reason     ibuprofen (ADVIL/MOTRIN) 600 MG tablet Alternate therapy     methocarbamol (ROBAXIN) 750 MG tablet Stopped by Patient         Encounter Diagnoses   Name Primary?     Coronary artery disease involving native coronary artery of native heart without angina pectoris      ÁLVAREZ (dyspnea on exertion) Yes     Essential hypertension, benign      Pure hypercholesterolemia      Non morbid obesity due to excess calories        CURRENT MEDICATIONS:  Current Outpatient Medications   Medication Sig Dispense Refill     acetaminophen 500 MG CAPS 2 tablets twice a day       amoxicillin (AMOXIL) 500 MG capsule Take 500 mg by mouth 3 times daily Before dental work       aspirin 81 MG tablet Take 81 mg by mouth daily       atorvastatin (LIPITOR) 40 MG tablet Take 1 tablet (40 mg) by mouth daily 90 tablet 3     Calcium Carb-Cholecalciferol (CALCIUM-VITAMIN D3) 250-125 MG-UNIT TABS Take 2 tablets by mouth 2 times daily       celecoxib (CELEBREX) 100 MG capsule Take 100 mg by mouth 2 times daily       cyanocobalamin (VITAMIN B-12) 1000 MCG tablet Take by mouth daily       diclofenac (VOLTAREN) 1 % GEL topical gel Place onto the skin as needed for moderate pain       docusate sodium (COLACE) 100 MG capsule Take 100 mg by mouth 2 times daily as needed for constipation       Emollient  (VANICREAM EX) APPLY THIN LAYER    TWICE A DAY FOR DRY SKIN       finasteride (PROSCAR) 5 MG tablet Take 5 mg by mouth daily       folic acid (FOLVITE) 1 MG tablet Take 1 mg by mouth daily       hydrocortisone 2.5 % cream Apply topically 2 times daily       lidocaine (LIDODERM) 5 % Patch Place 1 patch onto the skin       losartan (COZAAR) 50 MG tablet Take 1 tablet (50 mg) by mouth daily 90 tablet 3     omega 3 1000 MG CAPS Take by mouth daily       polyethylene glycol (MIRALAX/GLYCOLAX) Packet Take 1 packet by mouth as needed for constipation       primidone (MYSOLINE) 250 MG tablet Take 250 mg by mouth 2 times daily       propranolol (INDERAL) 10 MG tablet Take 10 mg by mouth daily as needed       propranolol ER (INDERAL LA) 120 MG 24 hr capsule Take 60 mg by mouth daily        psyllium 0.52 g capsule Take 1 capsule by mouth daily       sodium fluoride dental gel (PREVIDENT) 1.1 % GEL topical gel Apply to affected area At Bedtime       spironolactone (ALDACTONE) 25 MG tablet Take 1 tablet (25 mg) by mouth daily 90 tablet 3     tamsulosin (FLOMAX) 0.4 MG capsule Take 0.4 mg by mouth daily       traMADol (ULTRAM) 50 MG tablet TAKE ONE TABLET BY MOUTH TWICE A DAY AS NEEDED FOR LOW BACK PAIN       Urea 40 % CREA        Vitamin D, Cholecalciferol, 1000 units CAPS Take by mouth daily       valACYclovir (VALTREX) 1000 mg tablet TAKE TWO TABLETS BY MOUTH TWICE A DAY (Patient not taking: Reported on 9/9/2021)         ALLERGIES     Allergies   Allergen Reactions     Lisinopril      Morphine      confusion       PAST MEDICAL HISTORY:  Past Medical History:   Diagnosis Date     Coronary atherosclerosis of unspecified type of vessel, native or graft 8/01    cardiac cath 2005: GER to LAD; cath 2002: GER to RCA     Essential hypertension, benign     INTERMITTENT HYPERTENSION     Essential tremor      Generalized osteoarthrosis, unspecified site      Hyperlipidaemia      Hypertrophy of prostate without urinary obstruction and  other lower urinary tract symptoms (LUTS)     BPH - Dr Pinto     Impotence of organic origin     Dr Pinto - Dr Allred     NONSPECIFIC MEDICAL HISTORY     CERVICAL/LUMBAR RADICULOPATHY     NONSPECIFIC MEDICAL HISTORY     SHOULDER IMPINGEMENT     NSTEMI (non-ST elevated myocardial infarction) (H)     inferior 2001     Obese      Other and unspecified hyperlipidemia     ?     Peripheral neuropathy        PAST SURGICAL HISTORY:  Past Surgical History:   Procedure Laterality Date     C TOTAL KNEE ARTHROPLASTY  7/04    Knee Replacement, Total - LEFT Dr Regan     CARDIAC NUC DANISHA STRESS TEST NL  4/09    normal     COLONOSCOPY  6/06    normal - diverticulosis - dr adams     COLONOSCOPY  8/14/2012    Procedure: COLONOSCOPY;  COLONOSCOPY SCREENING/ 6 YEAR FOLLOW UP;  Surgeon: Rey Adams MD;  Location: [facility]     HC REPAIR UMBILICAL CHIKA,5+Y/O,REDUC  9/04    dr dominique     SURGICAL HISTORY OF -   1990    S/P CERVICAL DISCECTOMY x 2     SURGICAL HISTORY OF -   1990    S/P LUMBAR DISCECTOMY x 2     SURGICAL HISTORY OF -   1993    S/P LT CARPAL TUNNEL SURG/SHOULDER IMPINGEMENT     SURGICAL HISTORY OF -   8/01    S/P STENT OF RT CORONARY ARTERY     SURGICAL HISTORY OF -   9/05    Drug eluting stent to LAD       FAMILY HISTORY:  Family History   Problem Relation Age of Onset     Respiratory Father         COPD     Cerebrovascular Disease Mother         CVA     C.A.D. Brother         CABG - after CABG x 3 - rheumatic fever as a child     Breast Cancer Sister      Cerebrovascular Disease Sister        SOCIAL HISTORY:  Social History     Socioeconomic History     Marital status:      Spouse name: paola     Number of children: 4     Years of education: 14     Highest education level: None   Occupational History     None   Tobacco Use     Smoking status: Never Smoker     Smokeless tobacco: Never Used   Substance and Sexual Activity     Alcohol use: No     Drug use: No     Sexual activity: Yes     Partners: Female    Other Topics Concern      Service Not Asked     Blood Transfusions Not Asked     Caffeine Concern Yes     Comment: 3-4 cups qd     Occupational Exposure Not Asked     Hobby Hazards Not Asked     Sleep Concern Not Asked     Stress Concern Not Asked     Weight Concern Not Asked     Special Diet Not Asked     Back Care Not Asked     Exercise Yes     Comment: limited     Bike Helmet Not Asked     Seat Belt Yes     Self-Exams Not Asked     Parent/sibling w/ CABG, MI or angioplasty before 65F 55M? Not Asked   Social History Narrative     None     Social Determinants of Health     Financial Resource Strain:      Difficulty of Paying Living Expenses:    Food Insecurity:      Worried About Running Out of Food in the Last Year:      Ran Out of Food in the Last Year:    Transportation Needs:      Lack of Transportation (Medical):      Lack of Transportation (Non-Medical):    Physical Activity:      Days of Exercise per Week:      Minutes of Exercise per Session:    Stress:      Feeling of Stress :    Social Connections:      Frequency of Communication with Friends and Family:      Frequency of Social Gatherings with Friends and Family:      Attends [employer] Services:      Active Member of Clubs or Organizations:      Attends Club or Organization Meetings:      Marital Status:    Intimate Partner Violence:      Fear of Current or Ex-Partner:      Emotionally Abused:      Physically Abused:      Sexually Abused:        Review of Systems:  Skin:  Negative       Eyes:  Negative      ENT:  Negative      Respiratory:  Positive for dyspnea on exertion     Cardiovascular:    Positive for    Gastroenterology: Positive for heartburn    Genitourinary:  Positive for prostate problem    Musculoskeletal:  Positive for neck pain;joint pain;back pain    Neurologic:  Positive for tremors tingling left side of face on occ  Psychiatric:  Negative      Heme/Lymph/Imm:  Positive for easy bruising due to ASA  Endocrine:  Negative     "    Physical Exam:  Vitals: /74   Pulse 59   Ht 1.727 m (5' 8\")   Wt 111.7 kg (246 lb 4.8 oz)   SpO2 94%   BMI 37.45 kg/m      Constitutional:  cooperative, alert and oriented, well developed, well nourished, in no acute distress obese      Skin:  warm and dry to the touch, no apparent skin lesions or masses noted          Head:  normocephalic, no masses or lesions        Eyes:  pupils equal and round, conjunctivae and lids unremarkable, sclera white, no xanthalasma, EOMS intact, no nystagmus        Lymph:      ENT:  no pallor or cyanosis, dentition good        Neck:  carotid pulses are full and equal bilaterally        Respiratory:  normal breath sounds, clear to auscultation, normal A-P diameter, normal symmetry, normal respiratory excursion, no use of accessory muscles         Cardiac: regular rhythm;normal S1 and S2;no S3 or S4       systolic murmur;RUSB;grade 1        pulses full and equal                                        GI:    obese      Extremities and Muscular Skeletal:  no edema;no spinal abnormalities noted;normal muscle strength and tone              Neurological:  no gross motor deficits        Psych:  affect appropriate, oriented to time, person and place        CC  Nabeel Allred MD  [address]  [place] [address]                  Thank you for allowing me to participate in the care of your patient.      Sincerely,     Nabeel Allred MD     [facility]  cc:   Nabeel Allred MD  [address]  [place] [address]        "

## 2021-09-09 NOTE — TELEPHONE ENCOUNTER
----- Message from Nabeel Allred MD sent at 9/9/2021 12:56 PM CDT -----  No evidence of CHF. Continue current meds      Spoke to patient, reviewed BNP = 113, no changes per Dr Allred. Echo scheduled for 9/24/21 so will await results.

## 2021-09-24 ENCOUNTER — HOSPITAL ENCOUNTER (OUTPATIENT)
Dept: CARDIOLOGY | Facility: CLINIC | Age: 81
Discharge: HOME | End: 2021-09-24
Attending: INTERNAL MEDICINE | Admitting: INTERNAL MEDICINE
Payer: COMMERCIAL

## 2021-09-24 ENCOUNTER — TELEPHONE (OUTPATIENT)
Dept: CARDIOLOGY | Facility: CLINIC | Age: 81
End: 2021-09-24

## 2021-09-24 DIAGNOSIS — R06.09 DOE (DYSPNEA ON EXERTION): ICD-10-CM

## 2021-09-24 DIAGNOSIS — I25.10 CORONARY ARTERY DISEASE INVOLVING NATIVE CORONARY ARTERY OF NATIVE HEART WITHOUT ANGINA PECTORIS: ICD-10-CM

## 2021-09-24 LAB — LVEF ECHO: NORMAL

## 2021-09-24 PROCEDURE — 93306 TTE W/DOPPLER COMPLETE: CPT | Mod: 26 | Performed by: INTERNAL MEDICINE

## 2021-09-24 PROCEDURE — 999N000208 ECHOCARDIOGRAM COMPLETE

## 2021-09-24 PROCEDURE — 255N000002 HC RX 255 OP 636: Performed by: INTERNAL MEDICINE

## 2021-09-24 RX ADMIN — HUMAN ALBUMIN MICROSPHERES AND PERFLUTREN 3 ML: 10; .22 INJECTION, SOLUTION INTRAVENOUS at 15:36

## 2021-09-24 NOTE — TELEPHONE ENCOUNTER
Echocardiogram appears normal.  N-terminal proBNP is normal.  I do not think his shortness of breath is cardiac in origin.  Most likely age, weight and deconditioning.  No further evaluation at this time.

## 2021-09-24 NOTE — TELEPHONE ENCOUNTER
Echo 9-24-21  1. Normal biventricular size and function. Left ventricular ejection fraction  of 60-65%. No segmental wall motion abnormalities noted.  2. The left atrium is mildly dilated. Right atrial size is normal.  3. No hemodynamically significant valvular disease.  4. Normal pulmonary artery pressure.  No prior study for comparison.    Last Dr. Allred visit 9-9-21 for Dyspnea

## 2021-09-27 NOTE — TELEPHONE ENCOUNTER
Spoke with patient and spouse to review echo and BNP results. Reviewed that patient is to return next spring for follow up.  They ask that we [facility]   Faxed copy of echo, BNP, bmp and most recent lipids from June to [facility]

## 2021-09-27 NOTE — TELEPHONE ENCOUNTER
Dr. Allred's reply - Echocardiogram appears normal.  N-terminal proBNP is normal.  I do not think his shortness of breath is cardiac in origin.  Most likely age, weight and deconditioning.  No further evaluation at this time.

## 2022-03-10 ENCOUNTER — TRANSFERRED RECORDS (OUTPATIENT)
Dept: HEALTH INFORMATION MANAGEMENT | Facility: CLINIC | Age: 82
End: 2022-03-10
Payer: COMMERCIAL

## 2022-03-11 ENCOUNTER — TRANSFERRED RECORDS (OUTPATIENT)
Dept: HEALTH INFORMATION MANAGEMENT | Facility: CLINIC | Age: 82
End: 2022-03-11
Payer: COMMERCIAL

## 2022-03-12 ENCOUNTER — TRANSFERRED RECORDS (OUTPATIENT)
Dept: HEALTH INFORMATION MANAGEMENT | Facility: CLINIC | Age: 82
End: 2022-03-12
Payer: COMMERCIAL

## 2022-03-12 LAB
ALT SERPL-CCNC: 20 U/L (ref 5–60)
AST SERPL-CCNC: 28 U/L (ref 12–47)
CREATININE (EXTERNAL): 0.84 MG/DL (ref 0.6–1.5)
GFR ESTIMATED (EXTERNAL): 87 ML/MIN/1.73M2
GLUCOSE (EXTERNAL): 149 MG/DL (ref 70–115)
POTASSIUM (EXTERNAL): 4.3 MMOL/L (ref 3.6–5.3)

## 2022-03-14 ENCOUNTER — TRANSFERRED RECORDS (OUTPATIENT)
Dept: HEALTH INFORMATION MANAGEMENT | Facility: CLINIC | Age: 82
End: 2022-03-14
Payer: COMMERCIAL

## 2022-03-17 ENCOUNTER — TELEPHONE (OUTPATIENT)
Dept: CARDIOLOGY | Facility: CLINIC | Age: 82
End: 2022-03-17
Payer: COMMERCIAL

## 2022-03-17 DIAGNOSIS — I25.10 CORONARY ARTERY DISEASE INVOLVING NATIVE CORONARY ARTERY OF NATIVE HEART WITHOUT ANGINA PECTORIS: ICD-10-CM

## 2022-03-17 DIAGNOSIS — I49.5 SICK SINUS SYNDROME (H): Primary | ICD-10-CM

## 2022-03-17 NOTE — TELEPHONE ENCOUNTER
[facility] Call Center    Phone Message    May a detailed message be left on voicemail: yes     Reason for Call: Other: patient wife Christina is calling today regarding putting in orders for a stress test for the patient due to patient being in [place] at the moment and can't get it done there untill 3 months out. . they will be back home 4/6 and would like call back to [facility]     Action Taken: Message routed to:  [facility] clinical pool    Travel Screening: Not Applicable

## 2022-03-17 NOTE — TELEPHONE ENCOUNTER
No order for stress echo and no mention of need for stress echo in notes since last visit. Pt due for follow up 9/2022.     Spoke to patient's wife, stated patient had a PPM placed last week in [place]. He saw a cardiologist on Monday who said he needed a chemical stress test. They are coming back to [place] on 4/6 so they are hoping Dr Allred could order it. Will request records to verify exactly what is recommended before messaging Dr Allred.     [facility]   Hospitalized 3/10-3/12  Follow up visit done 3/14 w/ Dr Penelope Garcia (ph [phone])  Fax [phone]

## 2022-03-24 ENCOUNTER — TRANSFERRED RECORDS (OUTPATIENT)
Dept: HEALTH INFORMATION MANAGEMENT | Facility: CLINIC | Age: 82
End: 2022-03-24
Payer: COMMERCIAL

## 2022-03-24 PROBLEM — I49.5 SICK SINUS SYNDROME (H): Status: ACTIVE | Noted: 2022-03-24

## 2022-03-24 NOTE — TELEPHONE ENCOUNTER
Records received from [facility]-   Pt presented to ED on 3/10/22 for 's, found to be bradycardic with first degree AVB and sinus pauses. Pt was admitted and cardiology consult was placed to discuss PPM for ?sick sinus syndrome.   Dual-chamber PPM was implanted on 3/11/22.   Hypertensive urgency now better, add back metoprolol, propranolol on hold (taken for hx tremors)  Advised to hold eliquis until outpatient cardiology f/up   Troponin = 23 on 3/10; = 30 on 3/11    Records sent to scan.     Outpatient visit note from 3/14 requested, not received and still no mention of stress test in records that have been received.

## 2022-03-24 NOTE — TELEPHONE ENCOUNTER
08:56 Received limited note from [facility] noting that patient had a pacemaker implanted for sick sinus syndrome. There is an order for a lexiscan nuclear study pending but no discussion for the diagnosis.  Records sent to scan    Attempted to contact Dr. Jose Negrete @ [facility] @ [phone] for more information; office is not open yet. Will call later in the day.    11:30 spoke with staff at [facility]: patient showed some arrhythmia during admission so they planned an out-patient stress test.    Patient was admitted to [facility] - a separate facility in the system.  Phone: [phone]  FAX: [phone]  Resent HIMS request

## 2022-03-29 NOTE — TELEPHONE ENCOUNTER
Nabeel Allred MD Anderson, Barbara E RN 6 hours ago (1:42 AM)     Yes schedule Lexiscan    Message text      Elizabeth Zuluaga, Nabeel Hernandez MD; [facility] Team 2 18 hours ago (2:19 PM)     Patient was seen in [place] for hypertensive crisis and sick sinus syndrome.   Pacemaker implanted   Per EP consult - some arrhythmias noted during admission so lexiscan nuclear study was ordered   Patient wants to do the study here when they get back to [place] (4/6/2022)   +++OK to order lexican?   Due for follow up but you are booked into August - see ROBYN for test results?          Order placed for lexiscan. Left detailed message for patient that order was placed, scheduling phone number provided to arrange upon return to [place]. Pt to call back with any questions.

## 2022-04-21 ENCOUNTER — TRANSFERRED RECORDS (OUTPATIENT)
Dept: HEALTH INFORMATION MANAGEMENT | Facility: CLINIC | Age: 82
End: 2022-04-21
Payer: COMMERCIAL

## 2022-04-21 LAB — EJECTION FRACTION: 63 %

## 2022-04-22 ENCOUNTER — TELEPHONE (OUTPATIENT)
Dept: CARDIOLOGY | Facility: CLINIC | Age: 82
End: 2022-04-22
Payer: COMMERCIAL

## 2022-04-22 DIAGNOSIS — I25.10 CORONARY ARTERY DISEASE INVOLVING NATIVE CORONARY ARTERY OF NATIVE HEART WITHOUT ANGINA PECTORIS: Primary | ICD-10-CM

## 2022-04-22 NOTE — TELEPHONE ENCOUNTER
Obtain the stress test results.  He should then have an ROBYN visit to review and see if angiogram is warranted

## 2022-04-22 NOTE — TELEPHONE ENCOUNTER
"Spoke to patient/wife who said [facility] some \"concerning findings\" and \"a defect in the middle of his heart.\" He has been experiencing fatigue for about 6mos. He cannot walk far due to shortness of breath which improves with rest. No chest pain, lightheadedness/dizziness, weight gain, edema, or palpitations. They would prefer to see Dr Allred rather than see an ROBYN. Message sent to provider.     Pt/wife will work on having the report faxed and films mailed to the clinic. Contact information provided.   "

## 2022-04-22 NOTE — TELEPHONE ENCOUNTER
Spoke to patient/wife who were displeased with not being able to see Dr Allred, will call on Monday for an ROBYN visit next week. [facility], will have them fax the stress test report then. Order placed for follow up. Pt asks about going to the ED, recommended he go if he has chest pain and/or progressive symptoms.

## 2022-04-22 NOTE — TELEPHONE ENCOUNTER
[facility] Call Center    Phone Message    May a detailed message be left on voicemail: yes     Reason for Call: Other: Pt would like a call back asap as [facility] be [facility] as[facility] due to his stress test and pt would like to discuss as well as see if he can be seen asap as no appt were availble until Sept.Please reach out to pt to discuss      Action Taken: Message routed to:  [facility] Cardio    Travel Screening: Not Applicable

## 2022-04-25 ENCOUNTER — TRANSFERRED RECORDS (OUTPATIENT)
Dept: HEALTH INFORMATION MANAGEMENT | Facility: CLINIC | Age: 82
End: 2022-04-25
Payer: COMMERCIAL

## 2022-05-03 ENCOUNTER — OFFICE VISIT (OUTPATIENT)
Dept: CARDIOLOGY | Facility: CLINIC | Age: 82
End: 2022-05-03
Payer: COMMERCIAL

## 2022-05-03 VITALS
OXYGEN SATURATION: 96 % | HEIGHT: 68 IN | WEIGHT: 233 LBS | DIASTOLIC BLOOD PRESSURE: 73 MMHG | SYSTOLIC BLOOD PRESSURE: 115 MMHG | BODY MASS INDEX: 35.31 KG/M2 | HEART RATE: 60 BPM

## 2022-05-03 DIAGNOSIS — I49.5 SICK SINUS SYNDROME (H): ICD-10-CM

## 2022-05-03 DIAGNOSIS — I10 ESSENTIAL HYPERTENSION, BENIGN: ICD-10-CM

## 2022-05-03 DIAGNOSIS — I25.10 CORONARY ARTERY DISEASE INVOLVING NATIVE CORONARY ARTERY OF NATIVE HEART WITHOUT ANGINA PECTORIS: Primary | ICD-10-CM

## 2022-05-03 DIAGNOSIS — R06.09 DOE (DYSPNEA ON EXERTION): ICD-10-CM

## 2022-05-03 PROCEDURE — 99215 OFFICE O/P EST HI 40 MIN: CPT | Performed by: NURSE PRACTITIONER

## 2022-05-03 RX ORDER — FUROSEMIDE 20 MG
20 TABLET ORAL DAILY
Qty: 30 TABLET | Refills: 3 | Status: SHIPPED | OUTPATIENT
Start: 2022-05-03

## 2022-05-03 RX ORDER — DILTIAZEM HYDROCHLORIDE 180 MG/1
180 CAPSULE, EXTENDED RELEASE ORAL DAILY
Status: ON HOLD | COMMUNITY
End: 2024-05-09

## 2022-05-03 NOTE — PROGRESS NOTES
Cardiology Clinic Progress Note  Tony Bruce MRN# [id number]   YOB: 1940 Age: 81 year old     Primary cardiologist: Dr. 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    Tony was admitted to hospital in [place] with a light headed and dizziness and was found to have sinus node dysfunction and had a subsequent dual-chamber [employer] PPM placed. He was also hypertensive and after the PPM was placed metoprolol was added.  It was recommended that he undergo an outpatient stress echocardiogram.  Unfortunately the patient is unable to walk due to debilitating back pain and he underwent a Lexiscan nuclear stress test through the [facility] upon his return back to [place].  The stress test revealed a small reversible defect of the basal to mid inferior region of uncertain clinical significance that may represent attenuation artifact versus small area of ischemia.  LVEF was 63% with no focal wall motion abnormalities.  He had recent laboratory studies at the [facility] showing a BNP of 174 (elevated upon their laboratory standards), BUN 15, potassium 4.5, creatinine 0.9, hemoglobin 14.1 and WBC 4.3.    Today the patient states that over the last 6 months he has noticed an increase shortness of breath particularly on exertion.  Prior to his MI and subsequent unstable angina presentation he was asymptomatic.  He never had chest discomfort or shortness of breath on either occasions.  Specifically, he was short  of breath when ambulating across the [place] today.  Reviewed the stress test with Dr. Allred at the patient's request.     A lengthy discussion was had with the patient and his wife to pursue management for potential diastolic dysfunction versus proceeding to invasive coronary angiogram.  Through shared decision making it was elected to start a low-dose diuretic and follow-up in clinic.  If his symptoms are not significantly improved at follow-up, we will proceed with a coronary angiogram.            Assessment and Plan:     ASSESSMENT:    1. Shortness of breath on exertion    Etiology DD vs ischemia    2. Sinus node dysfunction    S/p  Dual chamber [facility] [facility]  PPM in [place] 3/11/2022    3. Coronary artery disease    History of MI in 2001 s/p GER to RCA.     4. Hypertension    Controlled    5. Hyperlipidemia    LDL 65    Atorvastatin 40 mg daily    PLAN:     1. Added Lasix 20 mg daily  2. Obtain BMP through [facility] (per patient request) in 1 to 2 weeks  3. Return to clinic in 2 weeks and if symptoms are not significantly improved we will proceed with coronary angiography.       Orders this Visit:  Orders Placed This Encounter   Procedures     Basic metabolic panel     Follow-Up with Cardiology ROBYN     Orders Placed This Encounter   Medications     diltiazem ER (DILT-XR) 180 MG 24 hr capsule     Sig: Take 180 mg by mouth daily     omeprazole (PRILOSEC) 20 MG DR capsule     Sig: Take 20 mg by mouth daily     linaclotide (LINZESS) 145 MCG capsule     Sig: Take by mouth every morning (before breakfast)     furosemide (LASIX) 20 MG tablet     Sig: Take 1 tablet (20 mg) by mouth daily     Dispense:  30 tablet     Refill:  3     There are no discontinued medications.    Today's clinic visit entailed:  Review of prior external note(s) [facility]  [facility] information from [place], [place] reviewed  Review of the result(s) of each unique test - Stress test, cath, BMP, FLP,  "BNP  Assessment requiring an independent historian(s) - family - Wife  40 minutes spent on the date of the encounter doing chart review, history and exam, documentation and further activities per the note  Provider  Link to [facility] Help Grid     The level of medical decision making during this visit was of moderate complexity.           Review of Systems:     Review of Systems:  Skin:  Negative     Eyes:  Negative    ENT:  Negative    Respiratory:  Positive for dyspnea on exertion;shortness of breath  Cardiovascular:  Negative    Gastroenterology: Positive for heartburn  Genitourinary:  Negative    Musculoskeletal:  Positive for neck pain;arthritis;back pain  Neurologic:  Positive for numbness or tingling of hands;numbness or tingling of feet  Psychiatric:  Negative    Heme/Lymph/Imm:  Positive for allergies  Endocrine:  Negative              Physical Exam:     Vitals: /73 (BP Location: Left arm, Cuff Size: Adult Large)   Pulse 60   Ht 1.727 m (5' 8\")   Wt 105.7 kg (233 lb)   SpO2 96%   BMI 35.43 kg/m      Constitutional: Obese and in no apparent distress.  Eyes: Pupils equal, round. Sclerae anicteric.   HEENT: Normocephalic, atraumatic.   Neck: Supple.  Respiratory: Breathing non-labored. Lungs clear to auscultation bilaterally. No crackles, wheezes, rhonchi, or rales.  Cardiovascular:  Regular rate and rhythm, normal S1 and S2. No murmur, rub, or gallop.  Skin: Warm, dry. No rashes, cyanosis, edema, or xanthelasma.  Musculoskeletal/Extremities: Moves all extremities well and symmetrically. No edema.  Neurologic: No gross motor deficits. Alert, awake, and oriented to person, place and time.  Psychiatric: Affect appropriate.             Medications:     Current Outpatient Medications   Medication Sig Dispense Refill     amoxicillin (AMOXIL) 500 MG capsule Take 500 mg by mouth 3 times daily Before dental work       aspirin 81 MG tablet Take 81 mg by mouth daily       atorvastatin (LIPITOR) 40 MG tablet Take 1 " tablet (40 mg) by mouth daily 90 tablet 3     Calcium Carb-Cholecalciferol (CALCIUM-VITAMIN D3) 250-125 MG-UNIT TABS Take 2 tablets by mouth 2 times daily       celecoxib (CELEBREX) 100 MG capsule Take 100 mg by mouth 2 times daily       cyanocobalamin (VITAMIN B-12) 1000 MCG tablet Take by mouth daily       diclofenac (VOLTAREN) 1 % GEL topical gel Place onto the skin as needed for moderate pain       diltiazem ER (DILT-XR) 180 MG 24 hr capsule Take 180 mg by mouth daily       docusate sodium (COLACE) 100 MG capsule Take 100 mg by mouth 2 times daily as needed for constipation       Emollient (VANICREAM EX) APPLY THIN LAYER    TWICE A DAY FOR DRY SKIN       finasteride (PROSCAR) 5 MG tablet Take 5 mg by mouth daily       folic acid (FOLVITE) 1 MG tablet Take 1 mg by mouth daily       furosemide (LASIX) 20 MG tablet Take 1 tablet (20 mg) by mouth daily 30 tablet 3     hydrocortisone 2.5 % cream Apply topically 2 times daily       lidocaine (LIDODERM) 5 % Patch Place 1 patch onto the skin       linaclotide (LINZESS) 145 MCG capsule Take by mouth every morning (before breakfast)       losartan (COZAAR) 50 MG tablet Take 1 tablet (50 mg) by mouth daily 90 tablet 3     omega 3 1000 MG CAPS Take by mouth daily       omeprazole (PRILOSEC) 20 MG DR capsule Take 20 mg by mouth daily       polyethylene glycol (MIRALAX/GLYCOLAX) Packet Take 1 packet by mouth as needed for constipation       primidone (MYSOLINE) 250 MG tablet Take 250 mg by mouth 2 times daily       propranolol (INDERAL) 10 MG tablet Take 10 mg by mouth daily as needed       propranolol ER (INDERAL LA) 120 MG 24 hr capsule Take 60 mg by mouth daily        psyllium 0.52 g capsule Take 1 capsule by mouth daily       sodium fluoride dental gel (PREVIDENT) 1.1 % GEL topical gel Apply to affected area At Bedtime       spironolactone (ALDACTONE) 25 MG tablet Take 1 tablet (25 mg) by mouth daily 90 tablet 3     tamsulosin (FLOMAX) 0.4 MG capsule Take 0.4 mg by mouth  daily       traMADol (ULTRAM) 50 MG tablet TAKE ONE TABLET BY MOUTH TWICE A DAY AS NEEDED FOR LOW BACK PAIN       Urea 40 % CREA        Vitamin D, Cholecalciferol, 1000 units CAPS Take by mouth daily       acetaminophen 500 MG CAPS 2 tablets twice a day (Patient not taking: Reported on 5/3/2022)       valACYclovir (VALTREX) 1000 mg tablet TAKE TWO TABLETS BY MOUTH TWICE A DAY (Patient not taking: No sig reported)         Family History   Problem Relation Age of Onset     Respiratory Father         COPD     Cerebrovascular Disease Mother         CVA     C.A.D. Brother         CABG - after CABG x 3 - rheumatic fever as a child     Breast Cancer Sister      Cerebrovascular Disease Sister        Social History     Socioeconomic History     Marital status:      Spouse name: paola     Number of children: 4     Years of education: 14     Highest education level: Not on file   Occupational History     Not on file   Tobacco Use     Smoking status: Never Smoker     Smokeless tobacco: Never Used   Substance and Sexual Activity     Alcohol use: No     Drug use: No     Sexual activity: Yes     Partners: Female   Other Topics Concern      Service Not Asked     Blood Transfusions Not Asked     Caffeine Concern Yes     Comment: 3-4 cups qd     Occupational Exposure Not Asked     Hobby Hazards Not Asked     Sleep Concern Not Asked     Stress Concern Not Asked     Weight Concern Not Asked     Special Diet Not Asked     Back Care Not Asked     Exercise Yes     Comment: limited     Bike Helmet Not Asked     Seat Belt Yes     Self-Exams Not Asked     Parent/sibling w/ CABG, MI or angioplasty before 65F 55M? Not Asked   Social History Narrative     Not on file     Social Determinants of Health     Financial Resource Strain: Not on file   Food Insecurity: Not on file   Transportation Needs: Not on file   Physical Activity: Not on file   Stress: Not on file   Social Connections: Not on file   Intimate Partner Violence: Not  on file   Housing Stability: Not on file            Past Medical History:     Past Medical History:   Diagnosis Date     Coronary atherosclerosis of unspecified type of vessel, native or graft 08/2001    cardiac cath 2005: GER to LAD; cath 2002: GER to RCA     Essential hypertension, benign     INTERMITTENT HYPERTENSION     Essential tremor      Generalized osteoarthrosis, unspecified site      Hyperlipidaemia      Hypertrophy of prostate without urinary obstruction and other lower urinary tract symptoms (LUTS)     BPH - Dr Pinto     Impotence of organic origin     Dr Pinto - Dr Allred     NONSPECIFIC MEDICAL HISTORY     CERVICAL/LUMBAR RADICULOPATHY     NONSPECIFIC MEDICAL HISTORY     SHOULDER IMPINGEMENT     NSTEMI (non-ST elevated myocardial infarction) (H)     inferior 2001     Obese      Other and unspecified hyperlipidemia     ?     Peripheral neuropathy      Sick sinus syndrome (H)     s/p [employer] [facility] pacemaker implanted March 2022 in [place]              Past Surgical History:     Past Surgical History:   Procedure Laterality Date     CARDIAC NUC DANISHA STRESS TEST NL  4/09    normal     COLONOSCOPY  6/06    normal - diverticulosis - dr adams     COLONOSCOPY  8/14/2012    Procedure: COLONOSCOPY;  COLONOSCOPY SCREENING/ 6 YEAR FOLLOW UP;  Surgeon: Rey Adams MD;  Location:  GI     HC REPAIR UMBILICAL CHIKA,5+Y/O,REDUC  9/04    dr dominique     SURGICAL HISTORY OF -   1990    S/P CERVICAL DISCECTOMY x 2     SURGICAL HISTORY OF -   1990    S/P LUMBAR DISCECTOMY x 2     SURGICAL HISTORY OF -   1993    S/P LT CARPAL TUNNEL SURG/SHOULDER IMPINGEMENT     SURGICAL HISTORY OF -   8/01    S/P STENT OF RT CORONARY ARTERY     SURGICAL HISTORY OF -   9/05    Drug eluting stent to LAD     ZZC TOTAL KNEE ARTHROPLASTY  7/04    Knee Replacement, Total - LEFT Dr Regan              Allergies:   Lisinopril and Morphine       Data:   All laboratory data reviewed:    Recent Labs   Lab Test 09/08/21  1018 06/21/21  0929  04/19/21  0000 09/09/20  0954 07/25/19  0800 01/02/19  0000   LDL  --  65 84 65 50  --    HDL  --  60 48 50 65  --    NHDL  --  84  --  85 64  --    CHOL  --  144 151 135 129  --    TRIG  --  93 95 102 70  --    TSH  --   --   --   --   --  3.98   NTBNP 113  --   --   --   --   --        Lab Results   Component Value Date    WBC 5.01 04/19/2021    RBC 3.97 09/18/2019    HGB 13.7 04/19/2021    HCT 41.5 04/19/2021    MCV 98 09/18/2019    MCH 32.5 09/18/2019    MCHC 33.2 09/18/2019    RDW 12.2 09/18/2019     04/19/2021       Lab Results   Component Value Date     09/08/2021     04/19/2021    POTASSIUM 4.5 09/08/2021    POTASSIUM 4.7 04/19/2021    CHLORIDE 107 09/08/2021    CHLORIDE 110 (H) 06/10/2021    CHLORIDE 105 10/07/2020    CO2 28 09/08/2021    CO2 29 10/07/2020    ANIONGAP 5 09/08/2021    ANIONGAP 11.6 10/07/2020     (H) 09/08/2021     (A) 04/19/2021    BUN 17 09/08/2021    BUN 17 04/19/2021    CR 0.83 09/08/2021    CR 0.9 04/19/2021    GFRESTIMATED 81 09/08/2021    GFRESTIMATED 81 04/19/2021    GFRESTBLACK 69 10/07/2020    EFREM 8.8 09/08/2021    EFREM 9.2 04/19/2021      Lab Results   Component Value Date    AST 28 04/19/2021    ALT 36 06/21/2021       Lab Results   Component Value Date    A1C 5.1 05/03/2013       Lab Results   Component Value Date    INR 1.02 08/27/2008    INR 1.34 (H) 11/27/2007         TYSON ALVARADO [facility]  Pager: [phone]  RN phone: [phone]

## 2022-05-03 NOTE — LETTER
5/3/2022    Formerly [facility]  [address]  [facility] [address]    RE: Tony Bruce       Dear Colleague,     I had the pleasure of seeing Tony Bruce in the [facility].    Cardiology Clinic Progress Note  Tony Bruce MRN# [id number]   YOB: 1940 Age: 81 year old     Primary cardiologist: Dr. 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    Tony was admitted to hospital in [place] with a light headed and dizziness and was found to have sinus node dysfunction and had a subsequent dual-chamber [facility] PPM placed. He was also hypertensive and after the PPM was placed metoprolol was added.  It was recommended that he undergo an outpatient stress echocardiogram.  Unfortunately the patient is unable to walk due to debilitating back pain and he underwent a Lexiscan nuclear stress test through the [facility] upon his return back to [place].  The stress test revealed a small reversible defect of the basal to mid inferior region of uncertain clinical significance that may represent attenuation artifact versus small area of ischemia.  LVEF was 63% with no focal wall motion abnormalities.  He had recent laboratory studies at the [facility] showing a BNP of 174 (elevated upon their laboratory standards), BUN 15, potassium 4.5, creatinine 0.9, hemoglobin 14.1 and WBC 4.3.    Today the patient states that over the last 6 months he has noticed an increase shortness of  breath particularly on exertion.  Prior to his MI and subsequent unstable angina presentation he was asymptomatic.  He never had chest discomfort or shortness of breath on either occasions.  Specifically, he was short of breath when ambulating across the [place] today.  Reviewed the stress test with Dr. Allred at the patient's request.     A lengthy discussion was had with the patient and his wife to pursue management for potential diastolic dysfunction versus proceeding to invasive coronary angiogram.  Through shared decision making it was elected to start a low-dose diuretic and follow-up in clinic.  If his symptoms are not significantly improved at follow-up, we will proceed with a coronary angiogram.            Assessment and Plan:     ASSESSMENT:    1. Shortness of breath on exertion    Etiology DD vs ischemia    2. Sinus node dysfunction    S/p  Dual chamber [facility]  PPM in [place] 3/11/2022    3. Coronary artery disease    History of MI in 2001 s/p GER to RCA.     4. Hypertension    Controlled    5. Hyperlipidemia    LDL 65    Atorvastatin 40 mg daily    PLAN:     1. Added Lasix 20 mg daily  2. Obtain BMP through [facility] (per patient request) in 1 to 2 weeks  3. Return to clinic in 2 weeks and if symptoms are not significantly improved we will proceed with coronary angiography.       Orders this Visit:  Orders Placed This Encounter   Procedures     Basic metabolic panel     Follow-Up with Cardiology ROBYN     Orders Placed This Encounter   Medications     diltiazem ER (DILT-XR) 180 MG 24 hr capsule     Sig: Take 180 mg by mouth daily     omeprazole (PRILOSEC) 20 MG DR capsule     Sig: Take 20 mg by mouth daily     linaclotide (LINZESS) 145 MCG capsule     Sig: Take by mouth every morning (before breakfast)     furosemide (LASIX) 20 MG tablet     Sig: Take 1 tablet (20 mg) by mouth daily     Dispense:  30 tablet     Refill:  3     There are no discontinued medications.    Today's clinic visit entailed:  Review of  "prior external note(s) [facility]  [facility]where information from [place], [place] reviewed  Review of the result(s) of each unique test - Stress test, cath, BMP, FLP, BNP  Assessment requiring an independent historian(s) - family - Wife  40 minutes spent on the date of the encounter doing chart review, history and exam, documentation and further activities per the note  Provider  Link to MDM Help Grid     The level of medical decision making during this visit was of moderate complexity.           Review of Systems:     Review of Systems:  Skin:  Negative     Eyes:  Negative    ENT:  Negative    Respiratory:  Positive for dyspnea on exertion;shortness of breath  Cardiovascular:  Negative    Gastroenterology: Positive for heartburn  Genitourinary:  Negative    Musculoskeletal:  Positive for neck pain;arthritis;back pain  Neurologic:  Positive for numbness or tingling of hands;numbness or tingling of feet  Psychiatric:  Negative    Heme/Lymph/Imm:  Positive for allergies  Endocrine:  Negative              Physical Exam:     Vitals: /73 (BP Location: Left arm, Cuff Size: Adult Large)   Pulse 60   Ht 1.727 m (5' 8\")   Wt 105.7 kg (233 lb)   SpO2 96%   BMI 35.43 kg/m      Constitutional: Obese and in no apparent distress.  Eyes: Pupils equal, round. Sclerae anicteric.   HEENT: Normocephalic, atraumatic.   Neck: Supple.  Respiratory: Breathing non-labored. Lungs clear to auscultation bilaterally. No crackles, wheezes, rhonchi, or rales.  Cardiovascular:  Regular rate and rhythm, normal S1 and S2. No murmur, rub, or gallop.  Skin: Warm, dry. No rashes, cyanosis, edema, or xanthelasma.  Musculoskeletal/Extremities: Moves all extremities well and symmetrically. No edema.  Neurologic: No gross motor deficits. Alert, awake, and oriented to person, place and time.  Psychiatric: Affect appropriate.             Medications:     Current Outpatient Medications   Medication " Sig Dispense Refill     amoxicillin (AMOXIL) 500 MG capsule Take 500 mg by mouth 3 times daily Before dental work       aspirin 81 MG tablet Take 81 mg by mouth daily       atorvastatin (LIPITOR) 40 MG tablet Take 1 tablet (40 mg) by mouth daily 90 tablet 3     Calcium Carb-Cholecalciferol (CALCIUM-VITAMIN D3) 250-125 MG-UNIT TABS Take 2 tablets by mouth 2 times daily       celecoxib (CELEBREX) 100 MG capsule Take 100 mg by mouth 2 times daily       cyanocobalamin (VITAMIN B-12) 1000 MCG tablet Take by mouth daily       diclofenac (VOLTAREN) 1 % GEL topical gel Place onto the skin as needed for moderate pain       diltiazem ER (DILT-XR) 180 MG 24 hr capsule Take 180 mg by mouth daily       docusate sodium (COLACE) 100 MG capsule Take 100 mg by mouth 2 times daily as needed for constipation       Emollient (VANICREAM EX) APPLY THIN LAYER    TWICE A DAY FOR DRY SKIN       finasteride (PROSCAR) 5 MG tablet Take 5 mg by mouth daily       folic acid (FOLVITE) 1 MG tablet Take 1 mg by mouth daily       furosemide (LASIX) 20 MG tablet Take 1 tablet (20 mg) by mouth daily 30 tablet 3     hydrocortisone 2.5 % cream Apply topically 2 times daily       lidocaine (LIDODERM) 5 % Patch Place 1 patch onto the skin       linaclotide (LINZESS) 145 MCG capsule Take by mouth every morning (before breakfast)       losartan (COZAAR) 50 MG tablet Take 1 tablet (50 mg) by mouth daily 90 tablet 3     omega 3 1000 MG CAPS Take by mouth daily       omeprazole (PRILOSEC) 20 MG DR capsule Take 20 mg by mouth daily       polyethylene glycol (MIRALAX/GLYCOLAX) Packet Take 1 packet by mouth as needed for constipation       primidone (MYSOLINE) 250 MG tablet Take 250 mg by mouth 2 times daily       propranolol (INDERAL) 10 MG tablet Take 10 mg by mouth daily as needed       propranolol ER (INDERAL LA) 120 MG 24 hr capsule Take 60 mg by mouth daily        psyllium 0.52 g capsule Take 1 capsule by mouth daily       sodium fluoride dental gel  (PREVIDENT) 1.1 % GEL topical gel Apply to affected area At Bedtime       spironolactone (ALDACTONE) 25 MG tablet Take 1 tablet (25 mg) by mouth daily 90 tablet 3     tamsulosin (FLOMAX) 0.4 MG capsule Take 0.4 mg by mouth daily       traMADol (ULTRAM) 50 MG tablet TAKE ONE TABLET BY MOUTH TWICE A DAY AS NEEDED FOR LOW BACK PAIN       Urea 40 % CREA        Vitamin D, Cholecalciferol, 1000 units CAPS Take by mouth daily       acetaminophen 500 MG CAPS 2 tablets twice a day (Patient not taking: Reported on 5/3/2022)       valACYclovir (VALTREX) 1000 mg tablet TAKE TWO TABLETS BY MOUTH TWICE A DAY (Patient not taking: No sig reported)         Family History   Problem Relation Age of Onset     Respiratory Father         COPD     Cerebrovascular Disease Mother         CVA     C.A.D. Brother         CABG - after CABG x 3 - rheumatic fever as a child     Breast Cancer Sister      Cerebrovascular Disease Sister        Social History     Socioeconomic History     Marital status:      Spouse name: paola     Number of children: 4     Years of education: 14     Highest education level: Not on file   Occupational History     Not on file   Tobacco Use     Smoking status: Never Smoker     Smokeless tobacco: Never Used   Substance and Sexual Activity     Alcohol use: No     Drug use: No     Sexual activity: Yes     Partners: Female   Other Topics Concern      Service Not Asked     Blood Transfusions Not Asked     Caffeine Concern Yes     Comment: 3-4 cups qd     Occupational Exposure Not Asked     Hobby Hazards Not Asked     Sleep Concern Not Asked     Stress Concern Not Asked     Weight Concern Not Asked     Special Diet Not Asked     Back Care Not Asked     Exercise Yes     Comment: limited     Bike Helmet Not Asked     Seat Belt Yes     Self-Exams Not Asked     Parent/sibling w/ CABG, MI or angioplasty before 65F 55M? Not Asked   Social History Narrative     Not on file     Social Determinants of Health      Financial Resource Strain: Not on file   Food Insecurity: Not on file   Transportation Needs: Not on file   Physical Activity: Not on file   Stress: Not on file   Social Connections: Not on file   Intimate Partner Violence: Not on file   Housing Stability: Not on file            Past Medical History:     Past Medical History:   Diagnosis Date     Coronary atherosclerosis of unspecified type of vessel, native or graft 08/2001    cardiac cath 2005: GER to LAD; cath 2002: GER to RCA     Essential hypertension, benign     INTERMITTENT HYPERTENSION     Essential tremor      Generalized osteoarthrosis, unspecified site      Hyperlipidaemia      Hypertrophy of prostate without urinary obstruction and other lower urinary tract symptoms (LUTS)     BPH - Dr Pinto     Impotence of organic origin     Dr Pinto - Dr Allred     NONSPECIFIC MEDICAL HISTORY     CERVICAL/LUMBAR RADICULOPATHY     NONSPECIFIC MEDICAL HISTORY     SHOULDER IMPINGEMENT     NSTEMI (non-ST elevated myocardial infarction) (H)     inferior 2001     Obese      Other and unspecified hyperlipidemia     ?     Peripheral neuropathy      Sick sinus syndrome (H)     s/p [facility] pacemaker implanted March 2022 in [place]              Past Surgical History:     Past Surgical History:   Procedure Laterality Date     CARDIAC NUC DANISHA STRESS TEST NL  4/09    normal     COLONOSCOPY  6/06    normal - diverticulosis - dr adams     COLONOSCOPY  8/14/2012    Procedure: COLONOSCOPY;  COLONOSCOPY SCREENING/ 6 YEAR FOLLOW UP;  Surgeon: Rey Adams MD;  Location:  GI     HC REPAIR UMBILICAL CHIKA,5+Y/O,REDUC  9/04    dr dominique     SURGICAL HISTORY OF -   1990    S/P CERVICAL DISCECTOMY x 2     SURGICAL HISTORY OF -   1990    S/P LUMBAR DISCECTOMY x 2     SURGICAL HISTORY OF -   1993    S/P LT CARPAL TUNNEL SURG/SHOULDER IMPINGEMENT     SURGICAL HISTORY OF -   8/01    S/P STENT OF RT CORONARY ARTERY     SURGICAL HISTORY OF -   9/05    Drug eluting stent to LAD      ZZC TOTAL KNEE ARTHROPLASTY  7/04    Knee Replacement, Total - LEFT Dr Regan              Allergies:   Lisinopril and Morphine       Data:   All laboratory data reviewed:    Recent Labs   Lab Test 09/08/21  1018 06/21/21  0951 04/19/21  0000 09/09/20  0954 07/25/19  0800 01/02/19  0000   LDL  --  65 84 65 50  --    HDL  --  60 48 50 65  --    NHDL  --  84  --  85 64  --    CHOL  --  144 151 135 129  --    TRIG  --  93 95 102 70  --    TSH  --   --   --   --   --  3.98   NTBNP 113  --   --   --   --   --        Lab Results   Component Value Date    WBC 5.01 04/19/2021    RBC 3.97 09/18/2019    HGB 13.7 04/19/2021    HCT 41.5 04/19/2021    MCV 98 09/18/2019    MCH 32.5 09/18/2019    MCHC 33.2 09/18/2019    RDW 12.2 09/18/2019     04/19/2021       Lab Results   Component Value Date     09/08/2021     04/19/2021    POTASSIUM 4.5 09/08/2021    POTASSIUM 4.7 04/19/2021    CHLORIDE 107 09/08/2021    CHLORIDE 110 (H) 06/10/2021    CHLORIDE 105 10/07/2020    CO2 28 09/08/2021    CO2 29 10/07/2020    ANIONGAP 5 09/08/2021    ANIONGAP 11.6 10/07/2020     (H) 09/08/2021     (A) 04/19/2021    BUN 17 09/08/2021    BUN 17 04/19/2021    CR 0.83 09/08/2021    CR 0.9 04/19/2021    GFRESTIMATED 81 09/08/2021    GFRESTIMATED 81 04/19/2021    GFRESTBLACK 69 10/07/2020    EFREM 8.8 09/08/2021    EFREM 9.2 04/19/2021      Lab Results   Component Value Date    AST 28 04/19/2021    ALT 36 06/21/2021       Lab Results   Component Value Date    A1C 5.1 05/03/2013       Lab Results   Component Value Date    INR 1.02 08/27/2008    INR 1.34 (H) 11/27/2007         TYSON ALVARADO CNP  [facility]  Pager: [phone]  RN phone: [phone]    Thank you for allowing me to participate in the care of your patient.      Sincerely,     TYSON ALVARADO CNP     [facility]  cc:   No referring provider defined for this encounter.

## 2022-05-03 NOTE — PATIENT INSTRUCTIONS
Today's Recommendations    Add Lasix 20 mg daily  BMP in 1-2 weeks  Continue all other medications without changes.  Please follow up with Caroline or another NP/PA in 2 weeks.    Please send a [phone] message or call [phone] to the RN team with questions or concerns.     Scheduling number [phone]    TYSON Barron, CNP

## 2022-05-04 ENCOUNTER — TRANSFERRED RECORDS (OUTPATIENT)
Dept: HEALTH INFORMATION MANAGEMENT | Facility: CLINIC | Age: 82
End: 2022-05-04

## 2022-05-16 ENCOUNTER — OFFICE VISIT (OUTPATIENT)
Dept: CARDIOLOGY | Facility: CLINIC | Age: 82
End: 2022-05-16
Attending: NURSE PRACTITIONER
Payer: COMMERCIAL

## 2022-05-16 VITALS
HEART RATE: 63 BPM | OXYGEN SATURATION: 97 % | BODY MASS INDEX: 35.8 KG/M2 | DIASTOLIC BLOOD PRESSURE: 74 MMHG | SYSTOLIC BLOOD PRESSURE: 116 MMHG | HEIGHT: 68 IN | WEIGHT: 236.2 LBS

## 2022-05-16 DIAGNOSIS — I10 ESSENTIAL HYPERTENSION, BENIGN: ICD-10-CM

## 2022-05-16 DIAGNOSIS — I25.10 CAD (CORONARY ARTERY DISEASE): Primary | ICD-10-CM

## 2022-05-16 DIAGNOSIS — I25.10 CORONARY ARTERY DISEASE INVOLVING NATIVE CORONARY ARTERY OF NATIVE HEART WITHOUT ANGINA PECTORIS: ICD-10-CM

## 2022-05-16 DIAGNOSIS — I49.5 SICK SINUS SYNDROME (H): ICD-10-CM

## 2022-05-16 DIAGNOSIS — R06.09 DOE (DYSPNEA ON EXERTION): ICD-10-CM

## 2022-05-16 PROCEDURE — 99214 OFFICE O/P EST MOD 30 MIN: CPT | Performed by: NURSE PRACTITIONER

## 2022-05-16 NOTE — LETTER
5/16/2022    [facility]  [address]  [facility] [address]    RE: Tony Bruce       Dear Colleague,     I had the pleasure of seeing Tony Bruce in the [facility].    Cardiology Clinic Progress Note  Tony Bruce MRN# [id number]   YOB: 1940 Age: 81 year old     Primary cardiologist: Dr. 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  He has a history of COVID in March 2020     Tony was admitted to hospital in [place] with a light headed and dizziness and was found to have sinus node dysfunction and had a subsequent dual-chamber [facility] PPM placed. He was also hypertensive and after the PPM was placed metoprolol was added.  It was recommended that he undergo an outpatient stress echocardiogram.     Unfortunately, the patient is unable to walk due to debilitating back pain and he underwent a Lexiscan nuclear stress test through the [facility] upon his return back to [place].  The stress test revealed a small reversible defect of the basal to mid inferior region of uncertain clinical significance that may represent attenuation artifact versus small area of ischemia.  LVEF was 63% with no focal wall motion abnormalities.  He had recent laboratory studies at the [facility] showing a BNP of 174 (elevated upon their laboratory standards), BUN 15, potassium 4.5, creatinine 0.9, hemoglobin 14.1 and WBC 4.3.    At our last visit we discussed his admission and testing in detail. Also, his testing was gone over  in detail with Dr. Allred.  He also reported an increase shortness of breath particularly on exertion.  He was started on Lasix 20 mg daily.  Repeat BMP was completed at the [facility] and the patient brings a paper copy of his labs today showing sodium of 141, potassium of 4.5, BUN of 18, creatinine of 1.0.     Today the patient reports with his wife.  He has not had any symptomatic improvement with the addition of Lasix and a would like to proceed with an invasive coronary angiogram.  He does have chronic back pain and will be unable to lie flat post procedure if a femoral artery access is obtained.    All risks and benefits for this procedure have been explained to this patient and accepted.  This includes but is not limited to death, heart attack, stroke, blood clots, cardiac or vascular perforation, bleeding including the need for blood transfusion and the risk thereof, allergic reaction to x-ray dye, arrhythmia necessitating cardioversion, contrast dye nephropathy (including risks of temporary or permanent dialysis).  We talked about intracoronary stenting and the risks thereof and bypass surgery.      No history of bleeding problems or current bleeding, and no scheduled surgeries or procedures in the next year. Patient understands and wishes to proceed with it.    Contrast allergy: NNo  Anticoagulation: No   Metformin: No (hold day of and 2 days post)  Oral DM meds: No (hold day of)  Insulin: No  Diuretic: Yes, Lasix and spironlactone  Use of phosphodiesterase type 5 inhibitor: No  Aspirin: Yes (take night before and morning of take 4)  Pt informed to be NPO at midnight: Yes  Renal issues No  Pt has transportation and 24 hours post procedure monitoring set up. Yes  Pt aware of no driving for 24 hours post procedure.  No           Assessment and Plan:     ASSESSMENT:    1. Shortness of breath on exertion    Etiology DD vs ischemia    No symptomatic improvement with diuretics    BMP stable     2. Sinus node  dysfunction    S/p  Dual chamber [facility]  PPM in [place] 3/11/2022     3. Coronary artery disease    History of MI in 2001 s/p GER to RCA.      4. Hypertension    Controlled     5. Hyperlipidemia    LDL 65    Atorvastatin 40 mg daily    PLAN:     1. Remain off Lasix at this time (patient has run out of tablets)  2. Proceed with coronary angiogram with Dr. Allred.  Due to chronic back pain patient will have extreme difficulty lying flat for prolonged period of time  3. Return to clinic in 7 to 10 days for follow-up.       Orders this Visit:  Orders Placed This Encounter   Procedures     Basic metabolic panel     Follow-Up with Cardiology ROBYN     Case Request Cath Lab: Coronary Angiogram     No orders of the defined types were placed in this encounter.    There are no discontinued medications.    Today's clinic visit entailed:  [facility]  Review of the result(s) of each unique test - Stress test, echocardiogram, coronary angiogram  Ordering of each unique test  Prescription drug management  25 minutes spent on the date of the encounter doing chart review, history and exam, documentation and further activities per the note  Provider  Link to [facility] Help Grid     The level of medical decision making during this visit was of moderate complexity.           Review of Systems:     Review of Systems:  Skin:  Positive for bruising   Eyes:  Negative glasses  ENT:  Negative    Respiratory:  Positive for dyspnea on exertion  Cardiovascular:  Negative    Gastroenterology: Positive for heartburn  Genitourinary:  Negative prostate problem  Musculoskeletal:  Positive for neck pain;arthritis;back pain  Neurologic:  Positive for numbness or tingling of hands;numbness or tingling of feet  Psychiatric:  Negative sleep disturbances  Heme/Lymph/Imm:  Positive for allergies  Endocrine:  Negative              Physical Exam:     Vitals: /74   Pulse 63   Ht 1.727 m  "(5' 8\")   Wt 107.1 kg (236 lb 3.2 oz)   SpO2 97%   BMI 35.91 kg/m    Constitutional: Well nourished and in no apparent distress.  Eyes: Pupils equal, round. Sclerae anicteric.   HEENT: Normocephalic, atraumatic.   Neck: Supple.  Respiratory: Breathing non-labored. Lungs clear to auscultation bilaterally. No crackles, wheezes, rhonchi, or rales.  Cardiovascular:  Regular rate and rhythm, normal S1 and S2. No murmur, rub, or gallop.  Skin: Warm, dry. No rashes, cyanosis, or xanthelasma.  Extremities: No edema.  Neurologic: No gross motor deficits. Alert, awake, and oriented to person, place and time.  Psychiatric: Affect appropriate.             Medications:     Current Outpatient Medications   Medication Sig Dispense Refill     acetaminophen 500 MG CAPS 2 tablets twice a day       amoxicillin (AMOXIL) 500 MG capsule Take 500 mg by mouth 3 times daily Before dental work       aspirin 81 MG tablet Take 81 mg by mouth daily       atorvastatin (LIPITOR) 40 MG tablet Take 1 tablet (40 mg) by mouth daily 90 tablet 3     Calcium Carb-Cholecalciferol (CALCIUM-VITAMIN D3) 250-125 MG-UNIT TABS Take 2 tablets by mouth 2 times daily       celecoxib (CELEBREX) 100 MG capsule Take 100 mg by mouth 2 times daily       cyanocobalamin (VITAMIN B-12) 1000 MCG tablet Take by mouth daily       diclofenac (VOLTAREN) 1 % GEL topical gel Place onto the skin as needed for moderate pain       diltiazem ER (DILT-XR) 180 MG 24 hr capsule Take 180 mg by mouth daily       docusate sodium (COLACE) 100 MG capsule Take 100 mg by mouth 2 times daily as needed for constipation       Emollient (VANICREAM EX) APPLY THIN LAYER    TWICE A DAY FOR DRY SKIN       finasteride (PROSCAR) 5 MG tablet Take 5 mg by mouth daily       folic acid (FOLVITE) 1 MG tablet Take 1 mg by mouth daily       furosemide (LASIX) 20 MG tablet Take 1 tablet (20 mg) by mouth daily 30 tablet 3     hydrocortisone 2.5 % cream Apply topically 2 times daily       lidocaine " (LIDODERM) 5 % Patch Place 1 patch onto the skin       linaclotide (LINZESS) 145 MCG capsule Take by mouth every morning (before breakfast)       losartan (COZAAR) 50 MG tablet Take 1 tablet (50 mg) by mouth daily 90 tablet 3     omega 3 1000 MG CAPS Take by mouth daily       omeprazole (PRILOSEC) 20 MG DR capsule Take 20 mg by mouth daily       polyethylene glycol (MIRALAX/GLYCOLAX) Packet Take 1 packet by mouth as needed for constipation       primidone (MYSOLINE) 250 MG tablet Take 250 mg by mouth 2 times daily       propranolol (INDERAL) 10 MG tablet Take 10 mg by mouth daily as needed       propranolol ER (INDERAL LA) 120 MG 24 hr capsule Take 60 mg by mouth daily        psyllium 0.52 g capsule Take 1 capsule by mouth daily       sodium fluoride dental gel (PREVIDENT) 1.1 % GEL topical gel Apply to affected area At Bedtime       spironolactone (ALDACTONE) 25 MG tablet Take 1 tablet (25 mg) by mouth daily 90 tablet 3     tamsulosin (FLOMAX) 0.4 MG capsule Take 0.4 mg by mouth daily       traMADol (ULTRAM) 50 MG tablet TAKE ONE TABLET BY MOUTH TWICE A DAY AS NEEDED FOR LOW BACK PAIN       Urea 40 % CREA        valACYclovir (VALTREX) 1000 mg tablet TAKE TWO TABLETS BY MOUTH TWICE A DAY       Vitamin D, Cholecalciferol, 1000 units CAPS Take by mouth daily         Family History   Problem Relation Age of Onset     Respiratory Father         COPD     Cerebrovascular Disease Mother         CVA     C.A.D. Brother         CABG - after CABG x 3 - rheumatic fever as a child     Breast Cancer Sister      Cerebrovascular Disease Sister        Social History     Socioeconomic History     Marital status:      Spouse name: paola     Number of children: 4     Years of education: 14     Highest education level: Not on file   Occupational History     Not on file   Tobacco Use     Smoking status: Never Smoker     Smokeless tobacco: Never Used   Substance and Sexual Activity     Alcohol use: No     Drug use: No     Sexual  activity: Yes     Partners: Female   Other Topics Concern      Service Not Asked     Blood Transfusions Not Asked     Caffeine Concern Yes     Comment: 3-4 cups qd     Occupational Exposure Not Asked     Hobby Hazards Not Asked     Sleep Concern Not Asked     Stress Concern Not Asked     Weight Concern Not Asked     Special Diet Not Asked     Back Care Not Asked     Exercise Yes     Comment: limited     Bike Helmet Not Asked     Seat Belt Yes     Self-Exams Not Asked     Parent/sibling w/ CABG, MI or angioplasty before 65F 55M? Not Asked   Social History Narrative     Not on file     Social Determinants of Health     Financial Resource Strain: Not on file   Food Insecurity: Not on file   Transportation Needs: Not on file   Physical Activity: Not on file   Stress: Not on file   Social Connections: Not on file   Intimate Partner Violence: Not on file   Housing Stability: Not on file            Past Medical History:     Past Medical History:   Diagnosis Date     Coronary atherosclerosis of unspecified type of vessel, native or graft 08/2001    cardiac cath 2005: GER to LAD; cath 2002: GER to RCA     Essential hypertension, benign     INTERMITTENT HYPERTENSION     Essential tremor      Generalized osteoarthrosis, unspecified site      Hyperlipidaemia      Hypertrophy of prostate without urinary obstruction and other lower urinary tract symptoms (LUTS)     BPH - Dr Pinto     Impotence of organic origin     Dr Pinto - Dr Allred     NONSPECIFIC MEDICAL HISTORY     CERVICAL/LUMBAR RADICULOPATHY     NONSPECIFIC MEDICAL HISTORY     SHOULDER IMPINGEMENT     NSTEMI (non-ST elevated myocardial infarction) (H)     inferior 2001     Obese      Other and unspecified hyperlipidemia     ?     Peripheral neuropathy      Sick sinus syndrome (H)     s/p [facility] pacemaker implanted March 2022 in [place]              Past Surgical History:     Past Surgical History:   Procedure Laterality Date     CARDIAC NUC DANISHA STRESS TEST NL   4/09    normal     COLONOSCOPY  6/06    normal - diverticulosis - dr adams     COLONOSCOPY  8/14/2012    Procedure: COLONOSCOPY;  COLONOSCOPY SCREENING/ 6 YEAR FOLLOW UP;  Surgeon: Rey Adams MD;  Location: [facility]     HC REPAIR UMBILICAL CHIKA,5+Y/O,REDUC  9/04    dr dominique     SURGICAL HISTORY OF -   1990    S/P CERVICAL DISCECTOMY x 2     SURGICAL HISTORY OF -   1990    S/P LUMBAR DISCECTOMY x 2     SURGICAL HISTORY OF -   1993    S/P LT CARPAL TUNNEL SURG/SHOULDER IMPINGEMENT     SURGICAL HISTORY OF -   8/01    S/P STENT OF RT CORONARY ARTERY     SURGICAL HISTORY OF -   9/05    Drug eluting stent to LAD     ZZC TOTAL KNEE ARTHROPLASTY  7/04    Knee Replacement, Total - LEFT Dr Regan              Allergies:   Lisinopril and Morphine       Data:   All laboratory data reviewed:    Recent Labs   Lab Test 09/08/21  1018 06/21/21  0951 04/19/21  0000 09/09/20  0954 07/25/19  0800 01/02/19  0000   LDL  --  65 84 65 50  --    HDL  --  60 48 50 65  --    NHDL  --  84  --  85 64  --    CHOL  --  144 151 135 129  --    TRIG  --  93 95 102 70  --    TSH  --   --   --   --   --  3.98   NTBNP 113  --   --   --   --   --        Lab Results   Component Value Date    WBC 5.01 04/19/2021    RBC 3.97 09/18/2019    HGB 13.7 04/19/2021    HCT 41.5 04/19/2021    MCV 98 09/18/2019    MCH 32.5 09/18/2019    MCHC 33.2 09/18/2019    RDW 12.2 09/18/2019     04/19/2021       Lab Results   Component Value Date     09/08/2021     04/19/2021    POTASSIUM 4.5 09/08/2021    POTASSIUM 4.7 04/19/2021    CHLORIDE 107 09/08/2021    CHLORIDE 110 (H) 06/10/2021    CHLORIDE 105 10/07/2020    CO2 28 09/08/2021    CO2 29 10/07/2020    ANIONGAP 5 09/08/2021    ANIONGAP 11.6 10/07/2020     (H) 09/08/2021     (A) 04/19/2021    BUN 17 09/08/2021    BUN 17 04/19/2021    CR 0.83 09/08/2021    CR 0.9 04/19/2021    GFRESTIMATED 81 09/08/2021    GFRESTIMATED 81 04/19/2021    GFRESTBLACK 69 10/07/2020    EFREM 8.8  09/08/2021    EFREM 9.2 04/19/2021      Lab Results   Component Value Date    AST 28 04/19/2021    ALT 36 06/21/2021       Lab Results   Component Value Date    A1C 5.1 05/03/2013       Lab Results   Component Value Date    INR 1.02 08/27/2008    INR 1.34 (H) 11/27/2007         TYSON ALVARADO CNP  [facility]  Pager: [phone]  RN phone: [phone]      Thank you for allowing me to participate in the care of your patient.      Sincerely,     TYSON ALVARADO CNP     [facility]  cc:   TYSON Will CNP  [address]  [place] [address]

## 2022-05-16 NOTE — PATIENT INSTRUCTIONS
Today's Recommendations    Coronary angiogram  Remain off Lasix (water pill) and if may restart or increase depending on cath results  Continue all other medications without changes.  Please follow up in 7-10 days after the procedures.    Please send a [employer] message or call [phone] to the RN team with questions or concerns.     Scheduling number [phone]    TYSON Barron, CNP

## 2022-05-16 NOTE — PROGRESS NOTES
Cardiology Clinic Progress Note  Tony Bruce MRN# [id number]   YOB: 1940 Age: 81 year old     Primary cardiologist: Dr. 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  He has a history of COVID in March 2020     Tony was admitted to hospital in [place] with a light headed and dizziness and was found to have sinus node dysfunction and had a subsequent dual-chamber [employer] [facility] PPM placed. He was also hypertensive and after the PPM was placed metoprolol was added.  It was recommended that he undergo an outpatient stress echocardiogram.     Unfortunately, the patient is unable to walk due to debilitating back pain and he underwent a Lexiscan nuclear stress test through the [facility] upon his return back to [place].  The stress test revealed a small reversible defect of the basal to mid inferior region of uncertain clinical significance that may represent attenuation artifact versus small area of ischemia.  LVEF was 63% with no focal wall motion abnormalities.  He had recent laboratory studies at the [facility] showing a BNP of 174 (elevated upon their laboratory standards), BUN 15, potassium 4.5, creatinine 0.9, hemoglobin 14.1 and WBC 4.3.    At our last visit we discussed his admission and testing in detail. Also, his testing was gone over in detail with Dr. Allred.  He also reported an increase shortness of breath particularly on exertion.  He was started on Lasix 20 mg daily.  Repeat BMP was completed at the [facility] and the patient brings a paper  copy of his labs today showing sodium of 141, potassium of 4.5, BUN of 18, creatinine of 1.0.     Today the patient reports with his wife.  He has not had any symptomatic improvement with the addition of Lasix and a would like to proceed with an invasive coronary angiogram.  He does have chronic back pain and will be unable to lie flat post procedure if a femoral artery access is obtained.    All risks and benefits for this procedure have been explained to this patient and accepted.  This includes but is not limited to death, heart attack, stroke, blood clots, cardiac or vascular perforation, bleeding including the need for blood transfusion and the risk thereof, allergic reaction to x-ray dye, arrhythmia necessitating cardioversion, contrast dye nephropathy (including risks of temporary or permanent dialysis).  We talked about intracoronary stenting and the risks thereof and bypass surgery.      No history of bleeding problems or current bleeding, and no scheduled surgeries or procedures in the next year. Patient understands and wishes to proceed with it.    Contrast allergy: NNo  Anticoagulation: No   Metformin: No (hold day of and 2 days post)  Oral DM meds: No (hold day of)  Insulin: No  Diuretic: Yes, Lasix and spironlactone  Use of phosphodiesterase type 5 inhibitor: No  Aspirin: Yes (take night before and morning of take 4)  Pt informed to be NPO at midnight: Yes  Renal issues No  Pt has transportation and 24 hours post procedure monitoring set up. Yes  Pt aware of no driving for 24 hours post procedure.  No           Assessment and Plan:     ASSESSMENT:    1. Shortness of breath on exertion    Etiology DD vs ischemia    No symptomatic improvement with diuretics    BMP stable     2. Sinus node dysfunction    S/p  Dual chamber [facility]  PPM in [place] 3/11/2022     3. Coronary artery disease    History of MI in 2001 s/p GER to RCA.      4. Hypertension    Controlled     5. Hyperlipidemia    LDL 65    Atorvastatin  "40 mg daily    PLAN:     1. Remain off Lasix at this time (patient has run out of tablets)  2. Proceed with coronary angiogram with Dr. Allred.  Due to chronic back pain patient will have extreme difficulty lying flat for prolonged period of time  3. Return to clinic in 7 to 10 days for follow-up.       Orders this Visit:  Orders Placed This Encounter   Procedures     Basic metabolic panel     Follow-Up with Cardiology ROBYN     Case Request Cath Lab: Coronary Angiogram     No orders of the defined types were placed in this encounter.    There are no discontinued medications.    Today's clinic visit entailed:  Review of prior external note(s) [facility]  reviewed  Review of the result(s) of each unique test - Stress test, echocardiogram, coronary angiogram  Ordering of each unique test  Prescription drug management  25 minutes spent on the date of the encounter doing chart review, history and exam, documentation and further activities per the note  Provider  Link to [facility] Help Grid     The level of medical decision making during this visit was of moderate complexity.           Review of Systems:     Review of Systems:  Skin:  Positive for bruising   Eyes:  Negative glasses  ENT:  Negative    Respiratory:  Positive for dyspnea on exertion  Cardiovascular:  Negative    Gastroenterology: Positive for heartburn  Genitourinary:  Negative prostate problem  Musculoskeletal:  Positive for neck pain;arthritis;back pain  Neurologic:  Positive for numbness or tingling of hands;numbness or tingling of feet  Psychiatric:  Negative sleep disturbances  Heme/Lymph/Imm:  Positive for allergies  Endocrine:  Negative              Physical Exam:     Vitals: /74   Pulse 63   Ht 1.727 m (5' 8\")   Wt 107.1 kg (236 lb 3.2 oz)   SpO2 97%   BMI 35.91 kg/m    Constitutional: Well nourished and in no apparent distress.  Eyes: Pupils equal, round. Sclerae anicteric.   HEENT: Normocephalic, atraumatic. "   Neck: Supple.  Respiratory: Breathing non-labored. Lungs clear to auscultation bilaterally. No crackles, wheezes, rhonchi, or rales.  Cardiovascular:  Regular rate and rhythm, normal S1 and S2. No murmur, rub, or gallop.  Skin: Warm, dry. No rashes, cyanosis, or xanthelasma.  Extremities: No edema.  Neurologic: No gross motor deficits. Alert, awake, and oriented to person, place and time.  Psychiatric: Affect appropriate.             Medications:     Current Outpatient Medications   Medication Sig Dispense Refill     acetaminophen 500 MG CAPS 2 tablets twice a day       amoxicillin (AMOXIL) 500 MG capsule Take 500 mg by mouth 3 times daily Before dental work       aspirin 81 MG tablet Take 81 mg by mouth daily       atorvastatin (LIPITOR) 40 MG tablet Take 1 tablet (40 mg) by mouth daily 90 tablet 3     Calcium Carb-Cholecalciferol (CALCIUM-VITAMIN D3) 250-125 MG-UNIT TABS Take 2 tablets by mouth 2 times daily       celecoxib (CELEBREX) 100 MG capsule Take 100 mg by mouth 2 times daily       cyanocobalamin (VITAMIN B-12) 1000 MCG tablet Take by mouth daily       diclofenac (VOLTAREN) 1 % GEL topical gel Place onto the skin as needed for moderate pain       diltiazem ER (DILT-XR) 180 MG 24 hr capsule Take 180 mg by mouth daily       docusate sodium (COLACE) 100 MG capsule Take 100 mg by mouth 2 times daily as needed for constipation       Emollient (VANICREAM EX) APPLY THIN LAYER    TWICE A DAY FOR DRY SKIN       finasteride (PROSCAR) 5 MG tablet Take 5 mg by mouth daily       folic acid (FOLVITE) 1 MG tablet Take 1 mg by mouth daily       furosemide (LASIX) 20 MG tablet Take 1 tablet (20 mg) by mouth daily 30 tablet 3     hydrocortisone 2.5 % cream Apply topically 2 times daily       lidocaine (LIDODERM) 5 % Patch Place 1 patch onto the skin       linaclotide (LINZESS) 145 MCG capsule Take by mouth every morning (before breakfast)       losartan (COZAAR) 50 MG tablet Take 1 tablet (50 mg) by mouth daily 90 tablet  3     omega 3 1000 MG CAPS Take by mouth daily       omeprazole (PRILOSEC) 20 MG DR capsule Take 20 mg by mouth daily       polyethylene glycol (MIRALAX/GLYCOLAX) Packet Take 1 packet by mouth as needed for constipation       primidone (MYSOLINE) 250 MG tablet Take 250 mg by mouth 2 times daily       propranolol (INDERAL) 10 MG tablet Take 10 mg by mouth daily as needed       propranolol ER (INDERAL LA) 120 MG 24 hr capsule Take 60 mg by mouth daily        psyllium 0.52 g capsule Take 1 capsule by mouth daily       sodium fluoride dental gel (PREVIDENT) 1.1 % GEL topical gel Apply to affected area At Bedtime       spironolactone (ALDACTONE) 25 MG tablet Take 1 tablet (25 mg) by mouth daily 90 tablet 3     tamsulosin (FLOMAX) 0.4 MG capsule Take 0.4 mg by mouth daily       traMADol (ULTRAM) 50 MG tablet TAKE ONE TABLET BY MOUTH TWICE A DAY AS NEEDED FOR LOW BACK PAIN       Urea 40 % CREA        valACYclovir (VALTREX) 1000 mg tablet TAKE TWO TABLETS BY MOUTH TWICE A DAY       Vitamin D, Cholecalciferol, 1000 units CAPS Take by mouth daily         Family History   Problem Relation Age of Onset     Respiratory Father         COPD     Cerebrovascular Disease Mother         CVA     C.A.D. Brother         CABG - after CABG x 3 - rheumatic fever as a child     Breast Cancer Sister      Cerebrovascular Disease Sister        Social History     Socioeconomic History     Marital status:      Spouse name: paola     Number of children: 4     Years of education: 14     Highest education level: Not on file   Occupational History     Not on file   Tobacco Use     Smoking status: Never Smoker     Smokeless tobacco: Never Used   Substance and Sexual Activity     Alcohol use: No     Drug use: No     Sexual activity: Yes     Partners: Female   Other Topics Concern      Service Not Asked     Blood Transfusions Not Asked     Caffeine Concern Yes     Comment: 3-4 cups qd     Occupational Exposure Not Asked     Hobby Hazards  Not Asked     Sleep Concern Not Asked     Stress Concern Not Asked     Weight Concern Not Asked     Special Diet Not Asked     Back Care Not Asked     Exercise Yes     Comment: limited     Bike Helmet Not Asked     Seat Belt Yes     Self-Exams Not Asked     Parent/sibling w/ CABG, MI or angioplasty before 65F 55M? Not Asked   Social History Narrative     Not on file     Social Determinants of Health     Financial Resource Strain: Not on file   Food Insecurity: Not on file   Transportation Needs: Not on file   Physical Activity: Not on file   Stress: Not on file   Social Connections: Not on file   Intimate Partner Violence: Not on file   Housing Stability: Not on file            Past Medical History:     Past Medical History:   Diagnosis Date     Coronary atherosclerosis of unspecified type of vessel, native or graft 08/2001    cardiac cath 2005: GER to LAD; cath 2002: GER to RCA     Essential hypertension, benign     INTERMITTENT HYPERTENSION     Essential tremor      Generalized osteoarthrosis, unspecified site      Hyperlipidaemia      Hypertrophy of prostate without urinary obstruction and other lower urinary tract symptoms (LUTS)     BPH - Dr Pinto     Impotence of organic origin     Dr Pinto - Dr Allred     NONSPECIFIC MEDICAL HISTORY     CERVICAL/LUMBAR RADICULOPATHY     NONSPECIFIC MEDICAL HISTORY     SHOULDER IMPINGEMENT     NSTEMI (non-ST elevated myocardial infarction) (H)     inferior 2001     Obese      Other and unspecified hyperlipidemia     ?     Peripheral neuropathy      Sick sinus syndrome (H)     s/p [facility] [facility] pacemaker implanted March 2022 in [place]              Past Surgical History:     Past Surgical History:   Procedure Laterality Date     CARDIAC NUC DANISHA STRESS TEST NL  4/09    normal     COLONOSCOPY  6/06    normal - diverticulosis - dr adams     COLONOSCOPY  8/14/2012    Procedure: COLONOSCOPY;  COLONOSCOPY SCREENING/ 6 YEAR FOLLOW UP;  Surgeon: Rey Adams MD;  Location:  GI      HC REPAIR UMBILICAL CHIKA,5+Y/O,REDUC  9/04    dr dominique     SURGICAL HISTORY OF -   1990    S/P CERVICAL DISCECTOMY x 2     SURGICAL HISTORY OF -   1990    S/P LUMBAR DISCECTOMY x 2     SURGICAL HISTORY OF -   1993    S/P LT CARPAL TUNNEL SURG/SHOULDER IMPINGEMENT     SURGICAL HISTORY OF -   8/01    S/P STENT OF RT CORONARY ARTERY     SURGICAL HISTORY OF -   9/05    Drug eluting stent to LAD     ZZC TOTAL KNEE ARTHROPLASTY  7/04    Knee Replacement, Total - LEFT Dr Regan              Allergies:   Lisinopril and Morphine       Data:   All laboratory data reviewed:    Recent Labs   Lab Test 09/08/21  1018 06/21/21  0951 04/19/21  0000 09/09/20  0954 07/25/19  0800 01/02/19  0000   LDL  --  65 84 65 50  --    HDL  --  60 48 50 65  --    NHDL  --  84  --  85 64  --    CHOL  --  144 151 135 129  --    TRIG  --  93 95 102 70  --    TSH  --   --   --   --   --  3.98   NTBNP 113  --   --   --   --   --        Lab Results   Component Value Date    WBC 5.01 04/19/2021    RBC 3.97 09/18/2019    HGB 13.7 04/19/2021    HCT 41.5 04/19/2021    MCV 98 09/18/2019    MCH 32.5 09/18/2019    MCHC 33.2 09/18/2019    RDW 12.2 09/18/2019     04/19/2021       Lab Results   Component Value Date     09/08/2021     04/19/2021    POTASSIUM 4.5 09/08/2021    POTASSIUM 4.7 04/19/2021    CHLORIDE 107 09/08/2021    CHLORIDE 110 (H) 06/10/2021    CHLORIDE 105 10/07/2020    CO2 28 09/08/2021    CO2 29 10/07/2020    ANIONGAP 5 09/08/2021    ANIONGAP 11.6 10/07/2020     (H) 09/08/2021     (A) 04/19/2021    BUN 17 09/08/2021    BUN 17 04/19/2021    CR 0.83 09/08/2021    CR 0.9 04/19/2021    GFRESTIMATED 81 09/08/2021    GFRESTIMATED 81 04/19/2021    GFRESTBLACK 69 10/07/2020    EFREM 8.8 09/08/2021    EFREM 9.2 04/19/2021      Lab Results   Component Value Date    AST 28 04/19/2021    ALT 36 06/21/2021       Lab Results   Component Value Date    A1C 5.1 05/03/2013       Lab Results   Component Value Date    INR 1.02  08/27/2008    INR 1.34 (H) 11/27/2007         TYSON ALVARADO [facility]  Pager: [phone]  RN phone: [phone]

## 2022-05-17 ENCOUNTER — TELEPHONE (OUTPATIENT)
Dept: CARDIOLOGY | Facility: CLINIC | Age: 82
End: 2022-05-17
Payer: COMMERCIAL

## 2022-05-17 NOTE — TELEPHONE ENCOUNTER
Received call from patients wife wondering about patients follow up visit. She thought Caroline said patient could be seen 6/6/22 at 1 pm instead of 6/21/22. RN sent Caroline message asking as pt is currently scheduled for 6/21/22 still. Will await Caroline's response.

## 2022-05-17 NOTE — TELEPHONE ENCOUNTER
Spoke with Caroline and she was ok switching patient to be seen 6/6/22 at 1 pm instead of 6/21/22. Pts wife updated and schedule changed.

## 2022-05-17 NOTE — TELEPHONE ENCOUNTER
[facility] Call Center    Phone Message    May a detailed message be left on voicemail: yes     Reason for Call: Other: Patient's spouse called to confim appointment change from 6/21 to 6/6 at 1pm.  did not see anything listed. Please call and discuss.      Action Taken: Other: Cardiology    Travel Screening: Not Applicable

## 2022-05-19 ENCOUNTER — TELEPHONE (OUTPATIENT)
Dept: CARDIOLOGY | Facility: CLINIC | Age: 82
End: 2022-05-19
Payer: COMMERCIAL

## 2022-05-19 DIAGNOSIS — I10 ESSENTIAL HYPERTENSION, BENIGN: Primary | ICD-10-CM

## 2022-05-19 DIAGNOSIS — I25.10 CORONARY ARTERY DISEASE INVOLVING NATIVE CORONARY ARTERY OF NATIVE HEART WITHOUT ANGINA PECTORIS: ICD-10-CM

## 2022-05-19 RX ORDER — ASPIRIN 81 MG/1
243 TABLET, CHEWABLE ORAL ONCE
Status: CANCELLED | OUTPATIENT
Start: 2022-05-19

## 2022-05-19 RX ORDER — ASPIRIN 325 MG
325 TABLET ORAL ONCE
Status: CANCELLED | OUTPATIENT
Start: 2022-05-19 | End: 2022-05-19

## 2022-05-19 RX ORDER — LIDOCAINE 40 MG/G
CREAM TOPICAL
Status: CANCELLED | OUTPATIENT
Start: 2022-05-19

## 2022-05-19 RX ORDER — SODIUM CHLORIDE 9 MG/ML
INJECTION, SOLUTION INTRAVENOUS CONTINUOUS
Status: CANCELLED | OUTPATIENT
Start: 2022-05-19

## 2022-05-19 NOTE — TELEPHONE ENCOUNTER
Contacted patient to review pre-cath procedures: patient is scheduled for coronary angiogram on 5-25-22   Patient's arrival time is  6:30  and procedure time is 1 st case of the day.   Reviewed that these times are not precise due to unplanned emergent needs of other patients.  Patient is aware to be NPO except for medications. Patient will hold the medication Spironolactone morning of.   Patient has arranged for transportation and 24 hour f/u care. ByWife    Patient has no known contract dye allergy.   Patient is not diabetic .  Patient is not taking / is taking the anti-coagulation medication.   Patient's renal function is WNL for procedure.   Patient will continue daily aspirin dose 81mg with 3 additional doses the morning of the procedure   Patient reminded to check temperature the morning of procedure; if T>100 then call [facility] @ [phone] or [facility] @ [phone] for instructions.  Order for procedure entered.    COVID-19 test is scheduled fo 5-22-22 and  reminded .    Patient will call with any further questions or concerns prior.       Heart cath orders entered.

## 2022-05-22 ENCOUNTER — LAB (OUTPATIENT)
Dept: LAB | Facility: CLINIC | Age: 82
End: 2022-05-22
Payer: COMMERCIAL

## 2022-05-22 DIAGNOSIS — I25.10 CAD (CORONARY ARTERY DISEASE): ICD-10-CM

## 2022-05-22 PROCEDURE — U0003 INFECTIOUS AGENT DETECTION BY NUCLEIC ACID (DNA OR RNA); SEVERE ACUTE RESPIRATORY SYNDROME CORONAVIRUS 2 (SARS-COV-2) (CORONAVIRUS DISEASE [COVID-19]), AMPLIFIED PROBE TECHNIQUE, MAKING USE OF HIGH THROUGHPUT TECHNOLOGIES AS DESCRIBED BY CMS-2020-01-R: HCPCS

## 2022-05-23 LAB — SARS-COV-2 RNA RESP QL NAA+PROBE: NEGATIVE

## 2022-05-25 ENCOUNTER — HOSPITAL ENCOUNTER (OUTPATIENT)
Facility: CLINIC | Age: 82
Discharge: HOME | End: 2022-05-25
Admitting: INTERNAL MEDICINE
Payer: COMMERCIAL

## 2022-05-25 VITALS
SYSTOLIC BLOOD PRESSURE: 129 MMHG | WEIGHT: 236 LBS | BODY MASS INDEX: 35.88 KG/M2 | OXYGEN SATURATION: 94 % | DIASTOLIC BLOOD PRESSURE: 68 MMHG | TEMPERATURE: 98.5 F | RESPIRATION RATE: 16 BRPM | HEART RATE: 60 BPM

## 2022-05-25 DIAGNOSIS — I25.118 CORONARY ARTERY DISEASE OF NATIVE ARTERY OF NATIVE HEART WITH STABLE ANGINA PECTORIS (H): ICD-10-CM

## 2022-05-25 DIAGNOSIS — Z98.61 POSTSURGICAL PERCUTANEOUS TRANSLUMINAL CORONARY ANGIOPLASTY STATUS: ICD-10-CM

## 2022-05-25 DIAGNOSIS — I10 ESSENTIAL HYPERTENSION, BENIGN: ICD-10-CM

## 2022-05-25 DIAGNOSIS — R06.09 DOE (DYSPNEA ON EXERTION): ICD-10-CM

## 2022-05-25 DIAGNOSIS — I25.10 CORONARY ARTERY DISEASE INVOLVING NATIVE CORONARY ARTERY OF NATIVE HEART WITHOUT ANGINA PECTORIS: ICD-10-CM

## 2022-05-25 PROBLEM — Z98.890 STATUS POST CORONARY ANGIOGRAM: Status: ACTIVE | Noted: 2022-05-25

## 2022-05-25 LAB
ACT BLD: 254 SECONDS (ref 74–150)
ANION GAP SERPL CALCULATED.3IONS-SCNC: 4 MMOL/L (ref 3–14)
BUN SERPL-MCNC: 16 MG/DL (ref 7–30)
CALCIUM SERPL-MCNC: 8.5 MG/DL (ref 8.5–10.1)
CHLORIDE BLD-SCNC: 109 MMOL/L (ref 94–109)
CO2 SERPL-SCNC: 27 MMOL/L (ref 20–32)
CREAT SERPL-MCNC: 0.84 MG/DL (ref 0.66–1.25)
ERYTHROCYTE [DISTWIDTH] IN BLOOD BY AUTOMATED COUNT: 11.7 % (ref 10–15)
GFR SERPL CREATININE-BSD FRML MDRD: 86 ML/MIN/1.73M2
GLUCOSE BLD-MCNC: 105 MG/DL (ref 70–99)
HCT VFR BLD AUTO: 39.5 % (ref 40–53)
HGB BLD-MCNC: 12.9 G/DL (ref 13.3–17.7)
INR PPP: 1.1 (ref 0.85–1.15)
MCH RBC QN AUTO: 33.3 PG (ref 26.5–33)
MCHC RBC AUTO-ENTMCNC: 32.7 G/DL (ref 31.5–36.5)
MCV RBC AUTO: 102 FL (ref 78–100)
PLATELET # BLD AUTO: 161 10E3/UL (ref 150–450)
POTASSIUM BLD-SCNC: 4.2 MMOL/L (ref 3.4–5.3)
RBC # BLD AUTO: 3.87 10E6/UL (ref 4.4–5.9)
SODIUM SERPL-SCNC: 140 MMOL/L (ref 133–144)
WBC # BLD AUTO: 3.6 10E3/UL (ref 4–11)

## 2022-05-25 PROCEDURE — 250N000011 HC RX IP 250 OP 636: Performed by: INTERNAL MEDICINE

## 2022-05-25 PROCEDURE — 99153 MOD SED SAME PHYS/QHP EA: CPT | Performed by: INTERNAL MEDICINE

## 2022-05-25 PROCEDURE — C1725 CATH, TRANSLUMIN NON-LASER: HCPCS | Performed by: INTERNAL MEDICINE

## 2022-05-25 PROCEDURE — 82310 ASSAY OF CALCIUM: CPT | Performed by: INTERNAL MEDICINE

## 2022-05-25 PROCEDURE — 36591 DRAW BLOOD OFF VENOUS DEVICE: CPT

## 2022-05-25 PROCEDURE — 93010 ELECTROCARDIOGRAM REPORT: CPT | Performed by: INTERNAL MEDICINE

## 2022-05-25 PROCEDURE — 250N000013 HC RX MED GY IP 250 OP 250 PS 637: Performed by: INTERNAL MEDICINE

## 2022-05-25 PROCEDURE — 999N000184 HC STATISTIC TELEMETRY

## 2022-05-25 PROCEDURE — 250N000009 HC RX 250: Performed by: INTERNAL MEDICINE

## 2022-05-25 PROCEDURE — C1894 INTRO/SHEATH, NON-LASER: HCPCS | Performed by: INTERNAL MEDICINE

## 2022-05-25 PROCEDURE — 93571 IV DOP VEL&/PRESS C FLO 1ST: CPT | Performed by: INTERNAL MEDICINE

## 2022-05-25 PROCEDURE — 93458 L HRT ARTERY/VENTRICLE ANGIO: CPT | Performed by: INTERNAL MEDICINE

## 2022-05-25 PROCEDURE — C1887 CATHETER, GUIDING: HCPCS | Performed by: INTERNAL MEDICINE

## 2022-05-25 PROCEDURE — 93458 L HRT ARTERY/VENTRICLE ANGIO: CPT | Mod: 26 | Performed by: INTERNAL MEDICINE

## 2022-05-25 PROCEDURE — 258N000003 HC RX IP 258 OP 636: Performed by: INTERNAL MEDICINE

## 2022-05-25 PROCEDURE — 93005 ELECTROCARDIOGRAM TRACING: CPT

## 2022-05-25 PROCEDURE — 272N000001 HC OR GENERAL SUPPLY STERILE: Performed by: INTERNAL MEDICINE

## 2022-05-25 PROCEDURE — C1769 GUIDE WIRE: HCPCS | Performed by: INTERNAL MEDICINE

## 2022-05-25 PROCEDURE — 85347 COAGULATION TIME ACTIVATED: CPT

## 2022-05-25 PROCEDURE — 999N000054 HC STATISTIC EKG NON-CHARGEABLE

## 2022-05-25 PROCEDURE — 85027 COMPLETE CBC AUTOMATED: CPT | Performed by: INTERNAL MEDICINE

## 2022-05-25 PROCEDURE — 99152 MOD SED SAME PHYS/QHP 5/>YRS: CPT | Performed by: INTERNAL MEDICINE

## 2022-05-25 PROCEDURE — 93571 IV DOP VEL&/PRESS C FLO 1ST: CPT | Mod: 26 | Performed by: INTERNAL MEDICINE

## 2022-05-25 PROCEDURE — 999N000071 HC STATISTIC HEART CATH LAB OR EP LAB

## 2022-05-25 PROCEDURE — 85610 PROTHROMBIN TIME: CPT | Performed by: INTERNAL MEDICINE

## 2022-05-25 PROCEDURE — 36415 COLL VENOUS BLD VENIPUNCTURE: CPT | Performed by: INTERNAL MEDICINE

## 2022-05-25 RX ORDER — FENTANYL CITRATE 50 UG/ML
25 INJECTION, SOLUTION INTRAMUSCULAR; INTRAVENOUS
Status: DISCONTINUED | OUTPATIENT
Start: 2022-05-25 | End: 2022-05-25 | Stop reason: HOSPADM

## 2022-05-25 RX ORDER — ASPIRIN 325 MG
325 TABLET ORAL ONCE
Status: DISCONTINUED | OUTPATIENT
Start: 2022-05-25 | End: 2022-05-25 | Stop reason: HOSPADM

## 2022-05-25 RX ORDER — NALOXONE HYDROCHLORIDE 0.4 MG/ML
0.4 INJECTION, SOLUTION INTRAMUSCULAR; INTRAVENOUS; SUBCUTANEOUS
Status: DISCONTINUED | OUTPATIENT
Start: 2022-05-25 | End: 2022-05-25 | Stop reason: HOSPADM

## 2022-05-25 RX ORDER — ACETAMINOPHEN 325 MG/1
650 TABLET ORAL EVERY 4 HOURS PRN
Status: DISCONTINUED | OUTPATIENT
Start: 2022-05-25 | End: 2022-05-25 | Stop reason: HOSPADM

## 2022-05-25 RX ORDER — FLUMAZENIL 0.1 MG/ML
0.2 INJECTION, SOLUTION INTRAVENOUS
Status: DISCONTINUED | OUTPATIENT
Start: 2022-05-25 | End: 2022-05-25 | Stop reason: HOSPADM

## 2022-05-25 RX ORDER — PROPRANOLOL HCL 60 MG
60 CAPSULE, EXTENDED RELEASE 24HR ORAL DAILY
Status: CANCELLED | OUTPATIENT
Start: 2022-05-25

## 2022-05-25 RX ORDER — IOPAMIDOL 755 MG/ML
INJECTION, SOLUTION INTRAVASCULAR
Status: DISCONTINUED | OUTPATIENT
Start: 2022-05-25 | End: 2022-05-25 | Stop reason: HOSPADM

## 2022-05-25 RX ORDER — CLOPIDOGREL BISULFATE 75 MG/1
75 TABLET ORAL DAILY
Qty: 90 TABLET | Refills: 3 | Status: SHIPPED | OUTPATIENT
Start: 2022-05-26 | End: 2022-05-26

## 2022-05-25 RX ORDER — ASPIRIN 81 MG/1
243 TABLET, CHEWABLE ORAL ONCE
Status: DISCONTINUED | OUTPATIENT
Start: 2022-05-25 | End: 2022-05-25 | Stop reason: HOSPADM

## 2022-05-25 RX ORDER — ISOSORBIDE MONONITRATE 30 MG/1
15 TABLET, EXTENDED RELEASE ORAL DAILY
Qty: 45 TABLET | Refills: 3 | Status: SHIPPED | OUTPATIENT
Start: 2022-05-25 | End: 2022-05-26

## 2022-05-25 RX ORDER — ATORVASTATIN CALCIUM 40 MG/1
40 TABLET, FILM COATED ORAL DAILY
Status: CANCELLED | OUTPATIENT
Start: 2022-05-25

## 2022-05-25 RX ORDER — CLOPIDOGREL BISULFATE 75 MG/1
75 TABLET ORAL DAILY
Status: DISCONTINUED | OUTPATIENT
Start: 2022-05-26 | End: 2022-05-25 | Stop reason: HOSPADM

## 2022-05-25 RX ORDER — DILTIAZEM HYDROCHLORIDE 180 MG/1
180 CAPSULE, EXTENDED RELEASE ORAL DAILY
Status: CANCELLED | OUTPATIENT
Start: 2022-05-25

## 2022-05-25 RX ORDER — OXYCODONE HYDROCHLORIDE 5 MG/1
5 TABLET ORAL EVERY 4 HOURS PRN
Status: DISCONTINUED | OUTPATIENT
Start: 2022-05-25 | End: 2022-05-25 | Stop reason: HOSPADM

## 2022-05-25 RX ORDER — SODIUM CHLORIDE 9 MG/ML
INJECTION, SOLUTION INTRAVENOUS CONTINUOUS
Status: DISCONTINUED | OUTPATIENT
Start: 2022-05-25 | End: 2022-05-25 | Stop reason: HOSPADM

## 2022-05-25 RX ORDER — ATROPINE SULFATE 0.1 MG/ML
0.5 INJECTION INTRAVENOUS
Status: DISCONTINUED | OUTPATIENT
Start: 2022-05-25 | End: 2022-05-25 | Stop reason: HOSPADM

## 2022-05-25 RX ORDER — SODIUM CHLORIDE 9 MG/ML
INJECTION, SOLUTION INTRAVENOUS CONTINUOUS
Status: ACTIVE | OUTPATIENT
Start: 2022-05-25 | End: 2022-05-25

## 2022-05-25 RX ORDER — CLOPIDOGREL BISULFATE 75 MG/1
TABLET ORAL
Status: DISCONTINUED | OUTPATIENT
Start: 2022-05-25 | End: 2022-05-25 | Stop reason: HOSPADM

## 2022-05-25 RX ORDER — OXYCODONE HYDROCHLORIDE 5 MG/1
10 TABLET ORAL EVERY 4 HOURS PRN
Status: DISCONTINUED | OUTPATIENT
Start: 2022-05-25 | End: 2022-05-25 | Stop reason: HOSPADM

## 2022-05-25 RX ORDER — ONDANSETRON 2 MG/ML
4 INJECTION INTRAMUSCULAR; INTRAVENOUS EVERY 6 HOURS PRN
Status: DISCONTINUED | OUTPATIENT
Start: 2022-05-25 | End: 2022-05-25 | Stop reason: HOSPADM

## 2022-05-25 RX ORDER — METOPROLOL TARTRATE 1 MG/ML
5 INJECTION, SOLUTION INTRAVENOUS
Status: DISCONTINUED | OUTPATIENT
Start: 2022-05-25 | End: 2022-05-25 | Stop reason: HOSPADM

## 2022-05-25 RX ORDER — FENTANYL CITRATE 50 UG/ML
INJECTION, SOLUTION INTRAMUSCULAR; INTRAVENOUS
Status: DISCONTINUED | OUTPATIENT
Start: 2022-05-25 | End: 2022-05-25 | Stop reason: HOSPADM

## 2022-05-25 RX ORDER — LIDOCAINE 40 MG/G
CREAM TOPICAL
Status: DISCONTINUED | OUTPATIENT
Start: 2022-05-25 | End: 2022-05-25 | Stop reason: HOSPADM

## 2022-05-25 RX ORDER — NALOXONE HYDROCHLORIDE 0.4 MG/ML
0.2 INJECTION, SOLUTION INTRAMUSCULAR; INTRAVENOUS; SUBCUTANEOUS
Status: DISCONTINUED | OUTPATIENT
Start: 2022-05-25 | End: 2022-05-25 | Stop reason: HOSPADM

## 2022-05-25 RX ORDER — NITROGLYCERIN 0.4 MG/1
0.4 TABLET SUBLINGUAL EVERY 5 MIN PRN
Status: DISCONTINUED | OUTPATIENT
Start: 2022-05-25 | End: 2022-05-25 | Stop reason: HOSPADM

## 2022-05-25 RX ORDER — ASPIRIN 81 MG/1
81 TABLET ORAL DAILY
Status: DISCONTINUED | OUTPATIENT
Start: 2022-05-26 | End: 2022-05-25 | Stop reason: HOSPADM

## 2022-05-25 RX ORDER — NITROGLYCERIN 0.4 MG/1
TABLET SUBLINGUAL
Qty: 25 TABLET | Refills: 3 | Status: SHIPPED | OUTPATIENT
Start: 2022-05-25 | End: 2024-05-15

## 2022-05-25 RX ORDER — NITROGLYCERIN 5 MG/ML
VIAL (ML) INTRAVENOUS
Status: DISCONTINUED | OUTPATIENT
Start: 2022-05-25 | End: 2022-05-25 | Stop reason: HOSPADM

## 2022-05-25 RX ORDER — VERAPAMIL HYDROCHLORIDE 2.5 MG/ML
INJECTION, SOLUTION INTRAVENOUS
Status: DISCONTINUED | OUTPATIENT
Start: 2022-05-25 | End: 2022-05-25 | Stop reason: HOSPADM

## 2022-05-25 RX ORDER — ASPIRIN 81 MG/1
81 TABLET, CHEWABLE ORAL ONCE
Status: DISCONTINUED | OUTPATIENT
Start: 2022-05-25 | End: 2022-05-25 | Stop reason: HOSPADM

## 2022-05-25 RX ORDER — HEPARIN SODIUM 1000 [USP'U]/ML
INJECTION, SOLUTION INTRAVENOUS; SUBCUTANEOUS
Status: DISCONTINUED | OUTPATIENT
Start: 2022-05-25 | End: 2022-05-25 | Stop reason: HOSPADM

## 2022-05-25 RX ORDER — HYDRALAZINE HYDROCHLORIDE 20 MG/ML
10 INJECTION INTRAMUSCULAR; INTRAVENOUS EVERY 4 HOURS PRN
Status: DISCONTINUED | OUTPATIENT
Start: 2022-05-25 | End: 2022-05-25 | Stop reason: HOSPADM

## 2022-05-25 RX ORDER — ONDANSETRON 4 MG/1
4 TABLET, ORALLY DISINTEGRATING ORAL EVERY 6 HOURS PRN
Status: DISCONTINUED | OUTPATIENT
Start: 2022-05-25 | End: 2022-05-25 | Stop reason: HOSPADM

## 2022-05-25 RX ADMIN — SODIUM CHLORIDE: 9 INJECTION, SOLUTION INTRAVENOUS at 07:14

## 2022-05-25 RX ADMIN — ONDANSETRON 4 MG: 2 INJECTION INTRAMUSCULAR; INTRAVENOUS at 10:23

## 2022-05-25 NOTE — PROGRESS NOTES
PATIENT/VISITOR WELLNESS SCREENING    Step 1 Patient Screening    1. In the last month, have you been in contact with someone who was confirmed or suspected to have Coronavirus/COVID-19? No    2. Do you have the following symptoms?  Fever/Chills? No   Cough? No   Shortness of breath? No   New loss of taste or smell? No  Sore throat? No  Muscle or body aches? No  Headaches? No  Fatigue? No  Vomiting or diarrhea? No    Step 2 Visitor Screening    1. Name of Visitor (1 visitor per patient): Christina    2. In the last month, have you been in contact with someone who was confirmed or suspected to have Coronavirus/COVID-19? No    3. Do you have the following symptoms?  Fever/Chills? No   Cough? No   Shortness of breath? No   Skin rash? No   Loss of taste or smell? No  Sore throat? No  Runny or stuffy nose? No  Muscle or body aches? No  Headaches? No  Fatigue? No  Vomiting or diarrhea? No    If the visitor has positive symptoms, notify supervisor/manger  Per policy, the visitor will need to leave the facility     Step 3 Refer to logic grid below for actions    NO SYMPTOM(S)    ACTIONS:  1. Standard rooming process  2. Provider to assess per normal protocol  3. Implement precautions as needed and per guidelines     POSITIVE SYMPTOM(S)  If positive for ANY of the following symptoms: fever, cough, shortness of breath, rash    ACTION:  1. Continue to have the patient wear a mask   2. Room patient as soon as possible  3. Don appropriate PPE when entering room  4. Provider evaluation

## 2022-05-25 NOTE — DISCHARGE INSTRUCTIONS
"Chief Complaint   Patient presents with     Pre-Op Exam     gall bladder-10/09       Initial /82 (BP Location: Left arm, Patient Position: Chair, Cuff Size: Adult Regular)  Pulse 69  Temp 97.7  F (36.5  C) (Oral)  Ht 5' 7\" (1.702 m)  Wt 178 lb 3.2 oz (80.8 kg)  LMP 07/19/2015  SpO2 100%  BMI 27.91 kg/m2 Estimated body mass index is 27.91 kg/(m^2) as calculated from the following:    Height as of this encounter: 5' 7\" (1.702 m).    Weight as of this encounter: 178 lb 3.2 oz (80.8 kg).  Medication Reconciliation: complete    " Cardiac Angiogram Discharge Instructions - Radial    After you go home:    Have an adult stay with you until tomorrow.  Drink extra fluids for 2 days.  You may resume your normal diet.  No smoking       For 24 hours - due to the sedation you received:  Relax and take it easy.  Do NOT make any important or legal decisions.  Do NOT drive or operate machines at home or at work.  Do NOT drink alcohol.    Care of Wrist Puncture Site:    For the first 24 hrs - check the puncture site every 1-2 hours while awake.  It is normal to have soreness at the puncture site and mild tingling in your hand for up to 3 days.  Remove the bandaid after 24 hours. If there is minor oozing, apply another bandaid and remove it after 12 hours.  You may shower tomorrow.  Do NOT take a bath, or use a hot tub or pool for at least 3 days. Do NOT scrub the site. Do not use lotion or powder near the puncture site.           Activity:        For 2 days:   do not use your hand or arm to support your weight (such as rising from a chair)   do not bend your wrist (such as lifting a garage door).  do not lift more than 5 pounds or exercise your arm (such as tennis, golf or bowling).  Do NOT do any heavy activity such as exercise, lifting, or straining.     Bleeding:    If you start bleeding from the site in your wrist, sit down and press firmly on/above the site for 10 minutes.   Once bleeding stops, keep arm still for 2 hours.   Call [facility] as soon as you can.       Call 911 right away if you have heavy bleeding or bleeding that does not stop.      Medicines:    If you are taking an antiplatelet medication such as Plavix, Brilinta or Effient, do not stop taking it until you talk to your cardiologist.      Take your medications, including blood thinners, unless your provider tells you not to.    If you have stopped any medicines, check with your provider about when to restart them.    Follow Up Appointments:    Follow up with [facility] Nurse  Practitioner at [facility] of patient preference in 7-10 days.    Call the clinic if:    You have a large or growing hard lump around the site.  The site is red, swollen, hot or tender.  Blood or fluid is draining from the site.  You have chills or a fever greater than 101 F (38 C).  Your arm feels numb, cool or changes color.  You have hives, a rash or unusual itching.  Any questions or concerns.          [facility] [place]:    [phone] [facility] (7 days a week)

## 2022-05-25 NOTE — PRE-PROCEDURE
GENERAL PRE-PROCEDURE:   Procedure:  Xoronary angiogram, left heart cath, possible intervention  Date/Time:  5/25/2022 8:21 AM    Written consent obtained?: Yes    Risks and benefits: Risks, benefits and alternatives were discussed    Consent given by:  Patient  Patient states understanding of procedure being performed: Yes    Patient's understanding of procedure matches consent: Yes    Procedure consent matches procedure scheduled: Yes    Expected level of sedation:  Moderate  Appropriately NPO:  Yes  Mallampati  :  Grade 2- soft palate, base of uvula, tonsillar pillars, and portion of posterior pharyngeal wall visible  Lungs:  Lungs clear with good breath sounds bilaterally  Heart:  Normal heart sounds and rate  History & Physical reviewed:  History and physical reviewed and no updates needed  Statement of review:  I have reviewed the lab findings, diagnostic data, medications, and the plan for sedation

## 2022-05-25 NOTE — PROGRESS NOTES
Care Suites Post Procedure Note    Patient Information  Name: Tony Bruce  Age: 82 year old    Post Procedure  Time patient returned to Care Suites: 0512  Concerns/abnormal assessment: none at this time  If abnormal assessment, provider notified: N/A  Plan/Other: post care as ordered discharge to home    Marques Kraft, RN     1015  DR Allred here to see pt.  Pt slightly nauseated, zofran given per order.  Pt still sleepy O2 sats on low side, placed on 2 L NC

## 2022-05-25 NOTE — PROGRESS NOTES
Care Suites Admission Nursing Note    Patient Information  Name: Tony Bruce  Age: 82 year old  Reason for admission: angiogram  Care Suites arrival time: 0630    Visitor Information  Name: Christina  Informed of visitor restrictions: Yes  1 visitor allowed per patient   Visitor must screen negative for COVID symptoms   Visitor must wear a mask  Waiting rooms closed to visitors    Patient Admission/Assessment   Pre-procedure assessment complete: Yes  If abnormal assessment/labs, provider notified: N/A  NPO: Yes  Medications held per instructions/orders: Yes  Consent: deferred  If applicable, pregnancy test status: deferred  Patient oriented to room: Yes  Education/questions answered: Yes  Plan/other: proceed as planned discharge to home    Discharge Planning  Discharge name/phone number: Christina [phone]  Overnight post sedation caregiver: Christina  Discharge location: home    Marques Kraft RN

## 2022-05-25 NOTE — PROGRESS NOTES
1230 Pt weaned off O2,  VSS.  Nausea improved ordered meal tray.slight ooze from TR band. Air replaced will try again per order  1315 cont to have a slight ooze when final air released.  1415 all air removed no oozing, TR band removed at 1420.  Pt up to BR voided annia well will discharge to home  Care Suites Discharge Nursing Note    Patient Information  Name: Tony Bruce  Age: 82 year old    Discharge Education:  Discharge instructions reviewed: Yes  Additional education/resources provided: yes  Patient/patient representative verbalizes understanding: Yes  Patient discharging on new medications: Yes  Medication education completed: Yes    Discharge Plans:   Discharge location: home  Discharge ride contacted: Yes  Approximate discharge time: 1515    Discharge Criteria:  Discharge criteria met and vital signs stable: Yes    Patient Belongs:  Patient belongings returned to patient: Yes    Marques Kraft RN

## 2022-05-26 ENCOUNTER — DOCUMENTATION ONLY (OUTPATIENT)
Dept: CARDIOLOGY | Facility: CLINIC | Age: 82
End: 2022-05-26
Payer: COMMERCIAL

## 2022-05-26 ENCOUNTER — TELEPHONE (OUTPATIENT)
Dept: CARDIOLOGY | Facility: CLINIC | Age: 82
End: 2022-05-26
Payer: COMMERCIAL

## 2022-05-26 DIAGNOSIS — I25.118 CORONARY ARTERY DISEASE OF NATIVE ARTERY OF NATIVE HEART WITH STABLE ANGINA PECTORIS (H): ICD-10-CM

## 2022-05-26 RX ORDER — ISOSORBIDE MONONITRATE 30 MG/1
15 TABLET, EXTENDED RELEASE ORAL DAILY
Qty: 7 TABLET | Refills: 0 | Status: SHIPPED | OUTPATIENT
Start: 2022-05-26 | End: 2022-08-16

## 2022-05-26 RX ORDER — CLOPIDOGREL BISULFATE 75 MG/1
75 TABLET ORAL DAILY
Qty: 15 TABLET | Refills: 0 | Status: SHIPPED | OUTPATIENT
Start: 2022-05-26 | End: 2023-05-17

## 2022-05-26 NOTE — TELEPHONE ENCOUNTER
Patient called after having a heart cath yesterday. The prescription for the clopidogrel and Imdur mononitrate [facility] not [facility] . Prescriptions sent yesterday.  Patient request a 2 week prescription on both medications be sent to [facility] pharmacy in [place] and then [facility].     Clopidogrel 75 mg one tablet daily  Isosorbide mononitrate 30 mg tablet take 0.5 tablets  (15mg by mouth daily).  Nitroglycerine SL [facility] be [facility] just [facility],     Prescriptions sent to local pharmacy.   [facility].     Patient agrees with plan.

## 2022-05-26 NOTE — PROGRESS NOTES
Re-faxed scripts sent yesterday by cath team: plavix, imdur, NTG and ASA with office note 5/16/2022 and cath report 5/25/2022 to [facility] @ [phone]

## 2022-05-27 LAB
ATRIAL RATE - MUSE: 60 BPM
DIASTOLIC BLOOD PRESSURE - MUSE: NORMAL MMHG
INTERPRETATION ECG - MUSE: NORMAL
P AXIS - MUSE: NORMAL DEGREES
PR INTERVAL - MUSE: 242 MS
QRS DURATION - MUSE: 162 MS
QT - MUSE: 484 MS
QTC - MUSE: 484 MS
R AXIS - MUSE: -60 DEGREES
SYSTOLIC BLOOD PRESSURE - MUSE: NORMAL MMHG
T AXIS - MUSE: 92 DEGREES
VENTRICULAR RATE- MUSE: 60 BPM

## 2022-05-31 ENCOUNTER — TELEPHONE (OUTPATIENT)
Dept: CARDIOLOGY | Facility: CLINIC | Age: 82
End: 2022-05-31
Payer: COMMERCIAL

## 2022-05-31 NOTE — TELEPHONE ENCOUNTER
GAGAN Britton, # [phone]  Fax# [phone], [facility]  [facility]     ROBYN 6-6-22 visit post Heart cath.     Heart cath 5-25-22 - 1.  Chronically occluded right coronary artery filling by left to right collaterals.  2.  Calcified distal left main ostial and proximal LAD that visually appears to be in the 55% range.  iFR however is 0.85.  I was unsuccessful in getting a balloon to advance across the stenosis and decided to stop and refer to the Chip team.    3.  50% first obtuse marginal stenosis.  4.  Left ventricular end-diastolic pressure of 25 mmHg.  No ventriculogram performed

## 2022-06-03 LAB
ATRIAL RATE - MUSE: 535 BPM
DIASTOLIC BLOOD PRESSURE - MUSE: NORMAL MMHG
INTERPRETATION ECG - MUSE: NORMAL
P AXIS - MUSE: NORMAL DEGREES
PR INTERVAL - MUSE: 324 MS
QRS DURATION - MUSE: 96 MS
QT - MUSE: 414 MS
QTC - MUSE: 414 MS
R AXIS - MUSE: 21 DEGREES
SYSTOLIC BLOOD PRESSURE - MUSE: NORMAL MMHG
T AXIS - MUSE: 9 DEGREES
VENTRICULAR RATE- MUSE: 60 BPM

## 2022-06-06 ENCOUNTER — OFFICE VISIT (OUTPATIENT)
Dept: CARDIOLOGY | Facility: CLINIC | Age: 82
End: 2022-06-06
Attending: NURSE PRACTITIONER
Payer: COMMERCIAL

## 2022-06-06 VITALS
BODY MASS INDEX: 35.97 KG/M2 | DIASTOLIC BLOOD PRESSURE: 65 MMHG | OXYGEN SATURATION: 96 % | HEART RATE: 60 BPM | SYSTOLIC BLOOD PRESSURE: 115 MMHG | WEIGHT: 237.3 LBS | HEIGHT: 68 IN

## 2022-06-06 DIAGNOSIS — R06.09 DOE (DYSPNEA ON EXERTION): ICD-10-CM

## 2022-06-06 DIAGNOSIS — I25.10 CORONARY ARTERY DISEASE INVOLVING NATIVE CORONARY ARTERY OF NATIVE HEART WITHOUT ANGINA PECTORIS: ICD-10-CM

## 2022-06-06 PROCEDURE — 99213 OFFICE O/P EST LOW 20 MIN: CPT | Performed by: NURSE PRACTITIONER

## 2022-06-06 NOTE — LETTER
6/6/2022    [facility]  [address] [facility] [id number]    RE: Tony Bruce       Dear Colleague,     I had the pleasure of seeing Tony Bruce in the [facility].    Cardiology Clinic Progress Note  Tony Bruce MRN# [id number]   YOB: 1940 Age: 82 year old     Primary cardiologist: Dr. 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  He has a history of COVID in March 2020     Tony was admitted to hospital in [place] with a light headed and dizziness and was found to have sinus node dysfunction and had a subsequent dual-chamber [facility] PPM placed. He was also hypertensive and after the PPM was placed metoprolol was added.  It was recommended that he undergo an outpatient stress echocardiogram.     Unfortunately, the patient is unable to walk due to debilitating back pain and he underwent a Lexiscan nuclear stress test through the [facility] upon his return back to [place].  The stress test revealed a small reversible defect of the basal to mid inferior region of uncertain clinical significance that may represent attenuation artifact versus small area of ischemia.  LVEF was 63% with no focal wall motion abnormalities.  He had recent laboratory studies at the [facility] showing a BNP of 174 (elevated upon their laboratory standards), BUN 15, potassium 4.5, creatinine 0.9, hemoglobin 14.1 and WBC 4.3.    At previously we discussed his admission and testing in detail and was reviewed with Dr. Allred. The patient  reported an increase shortness of breath particularly on exertion.  He was started on Lasix 20 mg daily without much symptom improvement. Post Lasix initiation a BMP was completed at the [facility] noting sodium 141, potassium of 4.5, BUN of 18, creatinine of 1.0. Due to no symptomatic improvement diuretics were discontinued and the patient was sent to cath lab.     The angiogram was completed on 5/25/2022 via a left radial approach. The study revealed LV filling pressures moderately elevated and an mid RCA  with left to right collaterals. There was a calcified distal LM and ostial to prox LAD that was 55% or (iFR 0.85). Dr. Allred was unable to advance a wire across the stenosis of the recommended evaluation by the CHIP team. He was placed on DAPT (ASA and Plavix) and Imdur.     Today Tnoy presents with his wife. He states he is actually feeling some what better and is fatigued less easily. We discussed the cath findings, and he would like to continue medical management at this point. He is scheudled to see Dr. Allred in August and will be reevaluated then          Assessment and Plan:     ASSESSMENT:    1. Shortness of breath on exertion    Improved on DAPT and Imdur    No symptomatic improvement with diuretics    BMP stable     2. Sinus node dysfunction    S/p  Dual chamber [facility] [facility]  PPM in [place] 3/11/2022     3. Coronary artery disease    History of MI in 2001 s/p GER to RCA.      4. Hypertension    Controlled     5. Hyperlipidemia    LDL 65    Atorvastatin 40 mg daily    PLAN:     1. Continue DAPT and Imdur   2. Follow up with Dr. Allred in August as scheduled. If symptoms progress prior, patient instructed to call.        Orders this Visit:  No orders of the defined types were placed in this encounter.    No orders of the defined types were placed in this encounter.    There are no discontinued medications.    Today's clinic visit entailed:  Review of prior external note(s) from - [facility]  "[facility]  reviewed  Review of the result(s) of each unique test - Stress test, echocardiogram, coronary angiogram  Ordering of each unique test  Prescription drug management  20 minutes spent on the date of the encounter doing chart review, history and exam, documentation and further activities per the note  Provider  Link to [facility] Help Grid     The level of medical decision making during this visit was of moderate complexity.           Review of Systems:     Review of Systems:  Skin:  Positive for bruising   Eyes:       ENT:       Respiratory:  Positive for dyspnea on exertion  Cardiovascular:  Negative;chest pain;palpitations;edema;dizziness;lightheadedness;fatigue    Gastroenterology:      Genitourinary:       Musculoskeletal:       Neurologic:       Psychiatric:       Heme/Lymph/Imm:       Endocrine:                 Physical Exam:     Vitals: /65 (BP Location: Left arm, Patient Position: Sitting)   Pulse 60   Ht 1.727 m (5' 8\")   Wt 107.6 kg (237 lb 4.8 oz)   SpO2 96%   BMI 36.08 kg/m    Constitutional: Well nourished and in no apparent distress.  Eyes: Pupils equal, round. Sclerae anicteric.   HEENT: Normocephalic, atraumatic.   Neck: Supple.  Respiratory: Breathing non-labored. Lungs clear to auscultation bilaterally. No crackles, wheezes, rhonchi, or rales.  Cardiovascular:  Regular rate and rhythm, normal S1 and S2. No murmur, rub, or gallop.  Skin: Warm, dry. No rashes, cyanosis, or xanthelasma.  Extremities: No edema.  Neurologic: No gross motor deficits. Alert, awake, and oriented to person, place and time.  Psychiatric: Affect appropriate.             Medications:     Current Outpatient Medications   Medication Sig Dispense Refill     acetaminophen 500 MG CAPS 2 tablets twice a day       amoxicillin (AMOXIL) 500 MG capsule Take 500 mg by mouth 3 times daily Before dental work       aspirin (ASA) 81 MG EC tablet Take 1 tablet (81 mg) by mouth daily Start tomorrow. 30 tablet 3     aspirin 81 MG " "tablet Take 81 mg by mouth daily       atorvastatin (LIPITOR) 40 MG tablet Take 1 tablet (40 mg) by mouth daily 90 tablet 3     Calcium Carb-Cholecalciferol (CALCIUM-VITAMIN D3) 250-125 MG-UNIT TABS Take 2 tablets by mouth 2 times daily       celecoxib (CELEBREX) 100 MG capsule Take 100 mg by mouth 2 times daily       clopidogrel (PLAVIX) 75 MG tablet Take 1 tablet (75 mg) by mouth daily 15 tablet 0     cyanocobalamin (VITAMIN B-12) 1000 MCG tablet Take by mouth daily       diclofenac (VOLTAREN) 1 % GEL topical gel Place onto the skin as needed for moderate pain       diltiazem ER (DILT-XR) 180 MG 24 hr capsule Take 180 mg by mouth daily       docusate sodium (COLACE) 100 MG capsule Take 100 mg by mouth 2 times daily as needed for constipation       Emollient (VANICREAM EX) APPLY THIN LAYER    TWICE A DAY FOR DRY SKIN       finasteride (PROSCAR) 5 MG tablet Take 5 mg by mouth daily       folic acid (FOLVITE) 1 MG tablet Take 1 mg by mouth daily       furosemide (LASIX) 20 MG tablet Take 1 tablet (20 mg) by mouth daily 30 tablet 3     hydrocortisone 2.5 % cream Apply topically 2 times daily       isosorbide mononitrate (IMDUR) 30 MG 24 hr tablet Take 0.5 tablets (15 mg) by mouth daily 7 tablet 0     lidocaine (LIDODERM) 5 % Patch Place 1 patch onto the skin       linaclotide (LINZESS) 145 MCG capsule Take by mouth every morning (before breakfast)       losartan (COZAAR) 50 MG tablet Take 1 tablet (50 mg) by mouth daily 90 tablet 3     nitroGLYcerin (NITROSTAT) 0.4 MG sublingual tablet One tablet under the tongue every 5 minutes if needed for chest pain. May repeat every 5 minutes for a maximum of 3 doses in 15 minutes\" 25 tablet 3     omega 3 1000 MG CAPS Take by mouth daily       omeprazole (PRILOSEC) 20 MG DR capsule Take 20 mg by mouth daily       polyethylene glycol (MIRALAX/GLYCOLAX) Packet Take 1 packet by mouth as needed for constipation       primidone (MYSOLINE) 250 MG tablet Take 250 mg by mouth 2 times " daily       propranolol (INDERAL) 10 MG tablet Take 10 mg by mouth daily as needed       propranolol ER (INDERAL LA) 120 MG 24 hr capsule Take 60 mg by mouth daily        psyllium 0.52 g capsule Take 1 capsule by mouth daily       sodium fluoride dental gel (PREVIDENT) 1.1 % GEL topical gel Apply to affected area At Bedtime       spironolactone (ALDACTONE) 25 MG tablet Take 1 tablet (25 mg) by mouth daily 90 tablet 3     tamsulosin (FLOMAX) 0.4 MG capsule Take 0.4 mg by mouth daily       traMADol (ULTRAM) 50 MG tablet TAKE ONE TABLET BY MOUTH TWICE A DAY AS NEEDED FOR LOW BACK PAIN       Urea 40 % CREA        valACYclovir (VALTREX) 1000 mg tablet TAKE TWO TABLETS BY MOUTH TWICE A DAY       Vitamin D, Cholecalciferol, 1000 units CAPS Take by mouth daily         Family History   Problem Relation Age of Onset     Respiratory Father         COPD     Cerebrovascular Disease Mother         CVA     C.A.D. Brother         CABG - after CABG x 3 - rheumatic fever as a child     Breast Cancer Sister      Cerebrovascular Disease Sister        Social History     Socioeconomic History     Marital status:      Spouse name: paola     Number of children: 4     Years of education: 14     Highest education level: Not on file   Occupational History     Not on file   Tobacco Use     Smoking status: Never Smoker     Smokeless tobacco: Never Used   Substance and Sexual Activity     Alcohol use: No     Drug use: No     Sexual activity: Yes     Partners: Female   Other Topics Concern      Service Not Asked     Blood Transfusions Not Asked     Caffeine Concern Yes     Comment: 3-4 cups qd     Occupational Exposure Not Asked     Hobby Hazards Not Asked     Sleep Concern Not Asked     Stress Concern Not Asked     Weight Concern Not Asked     Special Diet Not Asked     Back Care Not Asked     Exercise Yes     Comment: limited     Bike Helmet Not Asked     Seat Belt Yes     Self-Exams Not Asked     Parent/sibling w/ CABG, MI or  angioplasty before 65F 55M? Not Asked   Social History Narrative     Not on file     Social Determinants of Health     Financial Resource Strain: Not on file   Food Insecurity: Not on file   Transportation Needs: Not on file   Physical Activity: Not on file   Stress: Not on file   Social Connections: Not on file   Intimate Partner Violence: Not on file   Housing Stability: Not on file            Past Medical History:     Past Medical History:   Diagnosis Date     Coronary atherosclerosis of unspecified type of vessel, native or graft 08/2001    cardiac cath 2005: GER to LAD; cath 2002: GER to RCA     Essential hypertension, benign     INTERMITTENT HYPERTENSION     Essential tremor      Generalized osteoarthrosis, unspecified site      Hyperlipidaemia      Hypertrophy of prostate without urinary obstruction and other lower urinary tract symptoms (LUTS)     BPH - Dr Pinto     Impotence of organic origin     Dr Pinto - Dr Allred     NONSPECIFIC MEDICAL HISTORY     CERVICAL/LUMBAR RADICULOPATHY     NONSPECIFIC MEDICAL HISTORY     SHOULDER IMPINGEMENT     NSTEMI (non-ST elevated myocardial infarction) (H)     inferior 2001     Obese      Other and unspecified hyperlipidemia     ?     Peripheral neuropathy      Sick sinus syndrome (H)     s/p [facility] [facility] pacemaker implanted March 2022 in [place]              Past Surgical History:     Past Surgical History:   Procedure Laterality Date     CARDIAC NUC DANISHA STRESS TEST NL  4/09    normal     COLONOSCOPY  6/06    normal - diverticulosis - dr adams     COLONOSCOPY  8/14/2012    Procedure: COLONOSCOPY;  COLONOSCOPY SCREENING/ 6 YEAR FOLLOW UP;  Surgeon: Rey Adams MD;  Location:  GI     CV CORONARY ANGIOGRAM N/A 5/25/2022    Procedure: Coronary Angiogram;  Surgeon: Nabeel Allred MD;  Location:  HEART CARDIAC CATH LAB     CV INSTANTANEOUS WAVE-FREE RATIO N/A 5/25/2022    Procedure: Instantaneous Wave-Free Ratio;  Surgeon: Nabeel Allred MD;  Location:   HEART CARDIAC CATH LAB     CV LEFT HEART CATH N/A 5/25/2022    Procedure: Left Heart Catheterization;  Surgeon: Nabeel Allred MD;  Location:  HEART CARDIAC CATH LAB     CV PCI N/A 5/25/2022    Procedure: Percutaneous Coronary Intervention;  Surgeon: Nabeel Allred MD;  Location:  HEART CARDIAC CATH LAB     HC REPAIR UMBILICAL CHIKA,5+Y/O,REDUC  9/04    dr dominique     SURGICAL HISTORY OF -   1990    S/P CERVICAL DISCECTOMY x 2     SURGICAL HISTORY OF -   1990    S/P LUMBAR DISCECTOMY x 2     SURGICAL HISTORY OF -   1993    S/P LT CARPAL TUNNEL SURG/SHOULDER IMPINGEMENT     SURGICAL HISTORY OF -   8/01    S/P STENT OF RT CORONARY ARTERY     SURGICAL HISTORY OF -   9/05    Drug eluting stent to LAD     ZZC TOTAL KNEE ARTHROPLASTY  7/04    Knee Replacement, Total - LEFT Dr Regan              Allergies:   Lisinopril and Morphine       Data:   All laboratory data reviewed:    Recent Labs   Lab Test 09/08/21  1018 06/21/21  0951 04/19/21  0000 09/09/20  0954 07/25/19  0800 01/02/19  0000   LDL  --  65 84 65 50  --    HDL  --  60 48 50 65  --    NHDL  --  84  --  85 64  --    CHOL  --  144 151 135 129  --    TRIG  --  93 95 102 70  --    TSH  --   --   --   --   --  3.98   NTBNP 113  --   --   --   --   --        Lab Results   Component Value Date    WBC 3.6 (L) 05/25/2022    WBC 5.01 04/19/2021    RBC 3.87 (L) 05/25/2022    RBC 3.97 09/18/2019    HGB 12.9 (L) 05/25/2022    HGB 13.7 04/19/2021    HCT 39.5 (L) 05/25/2022    HCT 41.5 04/19/2021     (H) 05/25/2022    MCV 98 09/18/2019    MCH 33.3 (H) 05/25/2022    MCH 32.5 09/18/2019    MCHC 32.7 05/25/2022    MCHC 33.2 09/18/2019    RDW 11.7 05/25/2022    RDW 12.2 09/18/2019     05/25/2022     04/19/2021       Lab Results   Component Value Date     05/25/2022     04/19/2021    POTASSIUM 4.2 05/25/2022    POTASSIUM 4.7 04/19/2021    CHLORIDE 109 05/25/2022    CHLORIDE 108 (H) 05/16/2022    CHLORIDE 105 10/07/2020    CO2 27  05/25/2022    CO2 29 10/07/2020    ANIONGAP 4 05/25/2022    ANIONGAP 11.6 10/07/2020     (H) 05/25/2022     (A) 04/19/2021    BUN 16 05/25/2022    BUN 17 04/19/2021    CR 0.84 05/25/2022    CR 0.9 04/19/2021    GFRESTIMATED 86 05/25/2022    GFRESTIMATED 81 04/19/2021    GFRESTBLACK 69 10/07/2020    EFREM 8.5 05/25/2022    EFREM 9.2 04/19/2021      Lab Results   Component Value Date    AST 28 04/19/2021    ALT 36 06/21/2021       Lab Results   Component Value Date    A1C 5.1 05/03/2013       Lab Results   Component Value Date    INR 1.10 05/25/2022    INR 1.02 08/27/2008    INR 1.34 (H) 11/27/2007         TYSON ALVARADO CNP  [facility]  Pager: [phone]  RN phone: [phone]      Thank you for allowing me to participate in the care of your patient.      Sincerely,     TYSON ALVARADO CNP     [facility]  cc:   TYSON Will CNP  [address]  [place] [id number]

## 2022-06-06 NOTE — PROGRESS NOTES
Cardiology Clinic Progress Note  Tony Bruce MRN# [id number]   YOB: 1940 Age: 82 year old     Primary cardiologist: Dr. 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  He has a history of COVID in March 2020     Tony was admitted to hospital in [place] with a light headed and dizziness and was found to have sinus node dysfunction and had a subsequent dual-chamber [facility] PPM placed. He was also hypertensive and after the PPM was placed metoprolol was added.  It was recommended that he undergo an outpatient stress echocardiogram.     Unfortunately, the patient is unable to walk due to debilitating back pain and he underwent a Lexiscan nuclear stress test through the [facility] upon his return back to [place].  The stress test revealed a small reversible defect of the basal to mid inferior region of uncertain clinical significance that may represent attenuation artifact versus small area of ischemia.  LVEF was 63% with no focal wall motion abnormalities.  He had recent laboratory studies at the [facility] showing a BNP of 174 (elevated upon their laboratory standards), BUN 15, potassium 4.5, creatinine 0.9, hemoglobin 14.1 and WBC 4.3.    At previously we discussed his admission and testing in detail and was reviewed with Dr. Allred. The patient reported an increase shortness of breath particularly on exertion.  He was started on Lasix 20 mg daily without much symptom improvement. Post Lasix initiation a BMP was completed at the [facility] noting sodium 141, potassium  of 4.5, BUN of 18, creatinine of 1.0. Due to no symptomatic improvement diuretics were discontinued and the patient was sent to cath lab.     The angiogram was completed on 5/25/2022 via a left radial approach. The study revealed LV filling pressures moderately elevated and an mid RCA  with left to right collaterals. There was a calcified distal LM and ostial to prox LAD that was 55% or (iFR 0.85). Dr. Allred was unable to advance a wire across the stenosis of the recommended evaluation by the CHIP team. He was placed on DAPT (ASA and Plavix) and Imdur.     Today Tony presents with his wife. He states he is actually feeling some what better and is fatigued less easily. We discussed the cath findings, and he would like to continue medical management at this point. He is scheudled to see Dr. Allred in August and will be reevaluated then          Assessment and Plan:     ASSESSMENT:    1. Shortness of breath on exertion    Improved on DAPT and Imdur    No symptomatic improvement with diuretics    BMP stable     2. Sinus node dysfunction    S/p  Dual chamber [facility]  PPM in [place] 3/11/2022     3. Coronary artery disease    History of MI in 2001 s/p GER to RCA.      4. Hypertension    Controlled     5. Hyperlipidemia    LDL 65    Atorvastatin 40 mg daily    PLAN:     1. Continue DAPT and Imdur   2. Follow up with Dr. Allred in August as scheduled. If symptoms progress prior, patient instructed to call.        Orders this Visit:  No orders of the defined types were placed in this encounter.    No orders of the defined types were placed in this encounter.    There are no discontinued medications.    Today's clinic visit entailed:  [facility]  Review of the result(s) of each unique test - Stress test, echocardiogram, coronary angiogram  Ordering of each unique test  Prescription drug management  20 minutes spent on the date of the encounter  "doing chart review, history and exam, documentation and further activities per the note  Provider  Link to [facility] Help Grid     The level of medical decision making during this visit was of moderate complexity.           Review of Systems:     Review of Systems:  Skin:  Positive for bruising   Eyes:       ENT:       Respiratory:  Positive for dyspnea on exertion  Cardiovascular:  Negative;chest pain;palpitations;edema;dizziness;lightheadedness;fatigue    Gastroenterology:      Genitourinary:       Musculoskeletal:       Neurologic:       Psychiatric:       Heme/Lymph/Imm:       Endocrine:                 Physical Exam:     Vitals: /65 (BP Location: Left arm, Patient Position: Sitting)   Pulse 60   Ht 1.727 m (5' 8\")   Wt 107.6 kg (237 lb 4.8 oz)   SpO2 96%   BMI 36.08 kg/m    Constitutional: Well nourished and in no apparent distress.  Eyes: Pupils equal, round. Sclerae anicteric.   HEENT: Normocephalic, atraumatic.   Neck: Supple.  Respiratory: Breathing non-labored. Lungs clear to auscultation bilaterally. No crackles, wheezes, rhonchi, or rales.  Cardiovascular:  Regular rate and rhythm, normal S1 and S2. No murmur, rub, or gallop.  Skin: Warm, dry. No rashes, cyanosis, or xanthelasma.  Extremities: No edema.  Neurologic: No gross motor deficits. Alert, awake, and oriented to person, place and time.  Psychiatric: Affect appropriate.             Medications:     Current Outpatient Medications   Medication Sig Dispense Refill     acetaminophen 500 MG CAPS 2 tablets twice a day       amoxicillin (AMOXIL) 500 MG capsule Take 500 mg by mouth 3 times daily Before dental work       aspirin (ASA) 81 MG EC tablet Take 1 tablet (81 mg) by mouth daily Start tomorrow. 30 tablet 3     aspirin 81 MG tablet Take 81 mg by mouth daily       atorvastatin (LIPITOR) 40 MG tablet Take 1 tablet (40 mg) by mouth daily 90 tablet 3     Calcium Carb-Cholecalciferol (CALCIUM-VITAMIN D3) 250-125 MG-UNIT TABS Take 2 tablets by mouth " "2 times daily       celecoxib (CELEBREX) 100 MG capsule Take 100 mg by mouth 2 times daily       clopidogrel (PLAVIX) 75 MG tablet Take 1 tablet (75 mg) by mouth daily 15 tablet 0     cyanocobalamin (VITAMIN B-12) 1000 MCG tablet Take by mouth daily       diclofenac (VOLTAREN) 1 % GEL topical gel Place onto the skin as needed for moderate pain       diltiazem ER (DILT-XR) 180 MG 24 hr capsule Take 180 mg by mouth daily       docusate sodium (COLACE) 100 MG capsule Take 100 mg by mouth 2 times daily as needed for constipation       Emollient (VANICREAM EX) APPLY THIN LAYER    TWICE A DAY FOR DRY SKIN       finasteride (PROSCAR) 5 MG tablet Take 5 mg by mouth daily       folic acid (FOLVITE) 1 MG tablet Take 1 mg by mouth daily       furosemide (LASIX) 20 MG tablet Take 1 tablet (20 mg) by mouth daily 30 tablet 3     hydrocortisone 2.5 % cream Apply topically 2 times daily       isosorbide mononitrate (IMDUR) 30 MG 24 hr tablet Take 0.5 tablets (15 mg) by mouth daily 7 tablet 0     lidocaine (LIDODERM) 5 % Patch Place 1 patch onto the skin       linaclotide (LINZESS) 145 MCG capsule Take by mouth every morning (before breakfast)       losartan (COZAAR) 50 MG tablet Take 1 tablet (50 mg) by mouth daily 90 tablet 3     nitroGLYcerin (NITROSTAT) 0.4 MG sublingual tablet One tablet under the tongue every 5 minutes if needed for chest pain. May repeat every 5 minutes for a maximum of 3 doses in 15 minutes\" 25 tablet 3     omega 3 1000 MG CAPS Take by mouth daily       omeprazole (PRILOSEC) 20 MG DR capsule Take 20 mg by mouth daily       polyethylene glycol (MIRALAX/GLYCOLAX) Packet Take 1 packet by mouth as needed for constipation       primidone (MYSOLINE) 250 MG tablet Take 250 mg by mouth 2 times daily       propranolol (INDERAL) 10 MG tablet Take 10 mg by mouth daily as needed       propranolol ER (INDERAL LA) 120 MG 24 hr capsule Take 60 mg by mouth daily        psyllium 0.52 g capsule Take 1 capsule by mouth daily   "     sodium fluoride dental gel (PREVIDENT) 1.1 % GEL topical gel Apply to affected area At Bedtime       spironolactone (ALDACTONE) 25 MG tablet Take 1 tablet (25 mg) by mouth daily 90 tablet 3     tamsulosin (FLOMAX) 0.4 MG capsule Take 0.4 mg by mouth daily       traMADol (ULTRAM) 50 MG tablet TAKE ONE TABLET BY MOUTH TWICE A DAY AS NEEDED FOR LOW BACK PAIN       Urea 40 % CREA        valACYclovir (VALTREX) 1000 mg tablet TAKE TWO TABLETS BY MOUTH TWICE A DAY       Vitamin D, Cholecalciferol, 1000 units CAPS Take by mouth daily         Family History   Problem Relation Age of Onset     Respiratory Father         COPD     Cerebrovascular Disease Mother         CVA     C.A.D. Brother         CABG - after CABG x 3 - rheumatic fever as a child     Breast Cancer Sister      Cerebrovascular Disease Sister        Social History     Socioeconomic History     Marital status:      Spouse name: paola     Number of children: 4     Years of education: 14     Highest education level: Not on file   Occupational History     Not on file   Tobacco Use     Smoking status: Never Smoker     Smokeless tobacco: Never Used   Substance and Sexual Activity     Alcohol use: No     Drug use: No     Sexual activity: Yes     Partners: Female   Other Topics Concern      Service Not Asked     Blood Transfusions Not Asked     Caffeine Concern Yes     Comment: 3-4 cups qd     Occupational Exposure Not Asked     Hobby Hazards Not Asked     Sleep Concern Not Asked     Stress Concern Not Asked     Weight Concern Not Asked     Special Diet Not Asked     Back Care Not Asked     Exercise Yes     Comment: limited     Bike Helmet Not Asked     Seat Belt Yes     Self-Exams Not Asked     Parent/sibling w/ CABG, MI or angioplasty before 65F 55M? Not Asked   Social History Narrative     Not on file     Social Determinants of Health     Financial Resource Strain: Not on file   Food Insecurity: Not on file   Transportation Needs: Not on file    Physical Activity: Not on file   Stress: Not on file   Social Connections: Not on file   Intimate Partner Violence: Not on file   Housing Stability: Not on file            Past Medical History:     Past Medical History:   Diagnosis Date     Coronary atherosclerosis of unspecified type of vessel, native or graft 08/2001    cardiac cath 2005: GER to LAD; cath 2002: GER to RCA     Essential hypertension, benign     INTERMITTENT HYPERTENSION     Essential tremor      Generalized osteoarthrosis, unspecified site      Hyperlipidaemia      Hypertrophy of prostate without urinary obstruction and other lower urinary tract symptoms (LUTS)     BPH - Dr Pinto     Impotence of organic origin     Dr Pinto - Dr Allred     NONSPECIFIC MEDICAL HISTORY     CERVICAL/LUMBAR RADICULOPATHY     NONSPECIFIC MEDICAL HISTORY     SHOULDER IMPINGEMENT     NSTEMI (non-ST elevated myocardial infarction) (H)     inferior 2001     Obese      Other and unspecified hyperlipidemia     ?     Peripheral neuropathy      Sick sinus syndrome (H)     s/p [facility] pacemaker implanted March 2022 in [place]              Past Surgical History:     Past Surgical History:   Procedure Laterality Date     CARDIAC NUC DANISHA STRESS TEST NL  4/09    normal     COLONOSCOPY  6/06    normal - diverticulosis - dr adams     COLONOSCOPY  8/14/2012    Procedure: COLONOSCOPY;  COLONOSCOPY SCREENING/ 6 YEAR FOLLOW UP;  Surgeon: Rey Adams MD;  Location:  GI     CV CORONARY ANGIOGRAM N/A 5/25/2022    Procedure: Coronary Angiogram;  Surgeon: Nabeel Alrled MD;  Location: [facility] CARDIAC CATH LAB     CV INSTANTANEOUS WAVE-FREE RATIO N/A 5/25/2022    Procedure: Instantaneous Wave-Free Ratio;  Surgeon: Nabeel Allred MD;  Location:  HEART CARDIAC CATH LAB     CV LEFT HEART CATH N/A 5/25/2022    Procedure: Left Heart Catheterization;  Surgeon: Nabeel Allred MD;  Location:  HEART CARDIAC CATH LAB     CV PCI N/A 5/25/2022    Procedure: Percutaneous  Coronary Intervention;  Surgeon: Nabeel Allred MD;  Location:  HEART CARDIAC CATH LAB     HC REPAIR UMBILICAL CHIKA,5+Y/O,REDUC  9/04    dr dominique     SURGICAL HISTORY OF -   1990    S/P CERVICAL DISCECTOMY x 2     SURGICAL HISTORY OF -   1990    S/P LUMBAR DISCECTOMY x 2     SURGICAL HISTORY OF -   1993    S/P LT CARPAL TUNNEL SURG/SHOULDER IMPINGEMENT     SURGICAL HISTORY OF -   8/01    S/P STENT OF RT CORONARY ARTERY     SURGICAL HISTORY OF -   9/05    Drug eluting stent to LAD     ZZC TOTAL KNEE ARTHROPLASTY  7/04    Knee Replacement, Total - LEFT Dr Regan              Allergies:   Lisinopril and Morphine       Data:   All laboratory data reviewed:    Recent Labs   Lab Test 09/08/21  1018 06/21/21  0951 04/19/21  0000 09/09/20  0954 07/25/19  0800 01/02/19  0000   LDL  --  65 84 65 50  --    HDL  --  60 48 50 65  --    NHDL  --  84  --  85 64  --    CHOL  --  144 151 135 129  --    TRIG  --  93 95 102 70  --    TSH  --   --   --   --   --  3.98   NTBNP 113  --   --   --   --   --        Lab Results   Component Value Date    WBC 3.6 (L) 05/25/2022    WBC 5.01 04/19/2021    RBC 3.87 (L) 05/25/2022    RBC 3.97 09/18/2019    HGB 12.9 (L) 05/25/2022    HGB 13.7 04/19/2021    HCT 39.5 (L) 05/25/2022    HCT 41.5 04/19/2021     (H) 05/25/2022    MCV 98 09/18/2019    MCH 33.3 (H) 05/25/2022    MCH 32.5 09/18/2019    MCHC 32.7 05/25/2022    MCHC 33.2 09/18/2019    RDW 11.7 05/25/2022    RDW 12.2 09/18/2019     05/25/2022     04/19/2021       Lab Results   Component Value Date     05/25/2022     04/19/2021    POTASSIUM 4.2 05/25/2022    POTASSIUM 4.7 04/19/2021    CHLORIDE 109 05/25/2022    CHLORIDE 108 (H) 05/16/2022    CHLORIDE 105 10/07/2020    CO2 27 05/25/2022    CO2 29 10/07/2020    ANIONGAP 4 05/25/2022    ANIONGAP 11.6 10/07/2020     (H) 05/25/2022     (A) 04/19/2021    BUN 16 05/25/2022    BUN 17 04/19/2021    CR 0.84 05/25/2022    CR 0.9 04/19/2021     GFRESTIMATED 86 05/25/2022    GFRESTIMATED 81 04/19/2021    GFRESTBLACK 69 10/07/2020    EFREM 8.5 05/25/2022    EFREM 9.2 04/19/2021      Lab Results   Component Value Date    AST 28 04/19/2021    ALT 36 06/21/2021       Lab Results   Component Value Date    A1C 5.1 05/03/2013       Lab Results   Component Value Date    INR 1.10 05/25/2022    INR 1.02 08/27/2008    INR 1.34 (H) 11/27/2007         TYSON ALVARADO [facility]  Pager: [phone]  RN phone: [phone]

## 2022-06-14 ENCOUNTER — TRANSCRIBE ORDERS (OUTPATIENT)
Dept: OTHER | Age: 82
End: 2022-06-14
Payer: COMMERCIAL

## 2022-06-14 DIAGNOSIS — R06.00 DYSPNEA, UNSPECIFIED: Primary | ICD-10-CM

## 2022-06-23 ENCOUNTER — HOSPITAL ENCOUNTER (OUTPATIENT)
Dept: CARDIAC REHAB | Facility: CLINIC | Age: 82
Discharge: HOME | End: 2022-06-23
Attending: INTERNAL MEDICINE
Payer: COMMERCIAL

## 2022-06-23 DIAGNOSIS — R06.00 DYSPNEA, UNSPECIFIED: ICD-10-CM

## 2022-06-23 PROCEDURE — 93798 PHYS/QHP OP CAR RHAB W/ECG: CPT | Performed by: REHABILITATION PRACTITIONER

## 2022-06-23 PROCEDURE — 93797 PHYS/QHP OP CAR RHAB WO ECG: CPT | Mod: XU | Performed by: REHABILITATION PRACTITIONER

## 2022-06-27 ENCOUNTER — HOSPITAL ENCOUNTER (OUTPATIENT)
Dept: CARDIAC REHAB | Facility: CLINIC | Age: 82
Discharge: HOME | End: 2022-06-27
Attending: INTERNAL MEDICINE
Payer: COMMERCIAL

## 2022-06-27 PROCEDURE — 93798 PHYS/QHP OP CAR RHAB W/ECG: CPT

## 2022-06-30 ENCOUNTER — HOSPITAL ENCOUNTER (OUTPATIENT)
Dept: CARDIAC REHAB | Facility: CLINIC | Age: 82
Discharge: HOME | End: 2022-06-30
Attending: INTERNAL MEDICINE
Payer: COMMERCIAL

## 2022-06-30 PROCEDURE — 93798 PHYS/QHP OP CAR RHAB W/ECG: CPT | Performed by: REHABILITATION PRACTITIONER

## 2022-07-01 ENCOUNTER — HOSPITAL ENCOUNTER (OUTPATIENT)
Dept: CARDIAC REHAB | Facility: CLINIC | Age: 82
Discharge: HOME | End: 2022-07-01
Attending: INTERNAL MEDICINE
Payer: COMMERCIAL

## 2022-07-01 PROCEDURE — 93798 PHYS/QHP OP CAR RHAB W/ECG: CPT | Performed by: REHABILITATION PRACTITIONER

## 2022-07-06 ENCOUNTER — HOSPITAL ENCOUNTER (OUTPATIENT)
Dept: CARDIAC REHAB | Facility: CLINIC | Age: 82
Discharge: HOME | End: 2022-07-06
Attending: INTERNAL MEDICINE
Payer: COMMERCIAL

## 2022-07-06 PROCEDURE — 93798 PHYS/QHP OP CAR RHAB W/ECG: CPT

## 2022-07-08 ENCOUNTER — HOSPITAL ENCOUNTER (OUTPATIENT)
Dept: CARDIAC REHAB | Facility: CLINIC | Age: 82
Discharge: HOME | End: 2022-07-08
Attending: INTERNAL MEDICINE
Payer: COMMERCIAL

## 2022-07-08 PROCEDURE — 93798 PHYS/QHP OP CAR RHAB W/ECG: CPT

## 2022-07-11 ENCOUNTER — HOSPITAL ENCOUNTER (OUTPATIENT)
Dept: CARDIAC REHAB | Facility: CLINIC | Age: 82
Discharge: HOME | End: 2022-07-11
Attending: INTERNAL MEDICINE
Payer: COMMERCIAL

## 2022-07-11 PROCEDURE — 93798 PHYS/QHP OP CAR RHAB W/ECG: CPT | Performed by: REHABILITATION PRACTITIONER

## 2022-07-13 ENCOUNTER — HOSPITAL ENCOUNTER (OUTPATIENT)
Dept: CARDIAC REHAB | Facility: CLINIC | Age: 82
Discharge: HOME | End: 2022-07-13
Attending: INTERNAL MEDICINE
Payer: COMMERCIAL

## 2022-07-13 PROCEDURE — 93798 PHYS/QHP OP CAR RHAB W/ECG: CPT

## 2022-07-14 ENCOUNTER — HOSPITAL ENCOUNTER (OUTPATIENT)
Dept: CARDIAC REHAB | Facility: CLINIC | Age: 82
Discharge: HOME | End: 2022-07-14
Attending: INTERNAL MEDICINE
Payer: COMMERCIAL

## 2022-07-14 PROCEDURE — 93798 PHYS/QHP OP CAR RHAB W/ECG: CPT

## 2022-07-18 ENCOUNTER — HOSPITAL ENCOUNTER (OUTPATIENT)
Dept: CARDIAC REHAB | Facility: CLINIC | Age: 82
Discharge: HOME | End: 2022-07-18
Attending: INTERNAL MEDICINE
Payer: COMMERCIAL

## 2022-07-18 PROCEDURE — 93798 PHYS/QHP OP CAR RHAB W/ECG: CPT

## 2022-07-20 ENCOUNTER — HOSPITAL ENCOUNTER (OUTPATIENT)
Dept: CARDIAC REHAB | Facility: CLINIC | Age: 82
Discharge: HOME | End: 2022-07-20
Attending: INTERNAL MEDICINE
Payer: COMMERCIAL

## 2022-07-20 PROCEDURE — 93798 PHYS/QHP OP CAR RHAB W/ECG: CPT | Performed by: REHABILITATION PRACTITIONER

## 2022-07-22 ENCOUNTER — HOSPITAL ENCOUNTER (OUTPATIENT)
Dept: CARDIAC REHAB | Facility: CLINIC | Age: 82
Discharge: HOME | End: 2022-07-22
Attending: INTERNAL MEDICINE
Payer: COMMERCIAL

## 2022-07-22 PROCEDURE — 93798 PHYS/QHP OP CAR RHAB W/ECG: CPT

## 2022-07-25 ENCOUNTER — PRE VISIT (OUTPATIENT)
Dept: CARDIOLOGY | Facility: CLINIC | Age: 82
End: 2022-07-25

## 2022-07-25 ENCOUNTER — TRANSFERRED RECORDS (OUTPATIENT)
Dept: HEALTH INFORMATION MANAGEMENT | Facility: CLINIC | Age: 82
End: 2022-07-25

## 2022-07-25 NOTE — TELEPHONE ENCOUNTER
Called patient's spouse to ask if they are set up for device checks after new pacemaker implanted in March in [place]. Spouse states they brought home a [email or web address] unit from [employer] [facility]. She sent a request to have the strips transferred to Dr. Allred while they are in [place].    Message to EP team for help in finding patient's data so far.    Spouse will have patient schedule lipids locally before his appointment.

## 2022-07-25 NOTE — TELEPHONE ENCOUNTER
Pt is not set up in our device clinic. We cannot see the records from his [employer] remote monitor because they must still be going to his [place] clinic. PPM records will have to be obtained from his other clinic.

## 2022-07-26 ENCOUNTER — HOSPITAL ENCOUNTER (OUTPATIENT)
Dept: CARDIAC REHAB | Facility: CLINIC | Age: 82
Discharge: HOME | End: 2022-07-26
Attending: INTERNAL MEDICINE
Payer: COMMERCIAL

## 2022-07-26 PROCEDURE — 93798 PHYS/QHP OP CAR RHAB W/ECG: CPT

## 2022-07-26 NOTE — TELEPHONE ENCOUNTER
Spoke with patient's spouse, they have not established any PCP clinic or cardiology care in [place] outside the ER or hospital. Spouse will call the contact number on their home unit and see if she can find out where their information is being sent. She notes [facility] is [facility] [place], so wonders if that is the location.     Awaiting update from patient's spouse.      1530 per EP device team, the strips are not viewable in our system. The [employer] company has to release the records in their website to [employer] and then the strips can be viewed and reviewed.

## 2022-07-27 NOTE — TELEPHONE ENCOUNTER
Spoke with patient's spouse: she has been in contact with [employer] [facility] and the records are being sent to [facility] in [place], [place] ([phone]). She is waiting to hear back from them about moving the records to [facility].    7/28/2022 called [facility] to request the device records and/or have the account transferred to [facility]. Left a message for staff to call back.    Patient's pacemaker was implanted at [facility] in March, per [facility] [facility] the strips are being sent to [facility]. Unclear if there have been any device checks done since implantation.      7/29/2022 spoke with staff at [facility]. They have the strips from patient's home unit. Sent a records request to [phone] to request what they have.    Message to EP team to contact [facility] and have records access released to [facility] going forward

## 2022-07-28 ENCOUNTER — HOSPITAL ENCOUNTER (OUTPATIENT)
Dept: CARDIAC REHAB | Facility: CLINIC | Age: 82
Discharge: HOME | End: 2022-07-28
Attending: INTERNAL MEDICINE
Payer: COMMERCIAL

## 2022-07-28 PROCEDURE — 93798 PHYS/QHP OP CAR RHAB W/ECG: CPT | Performed by: REHABILITATION PRACTITIONER

## 2022-07-29 ENCOUNTER — HOSPITAL ENCOUNTER (OUTPATIENT)
Dept: CARDIAC REHAB | Facility: CLINIC | Age: 82
Discharge: HOME | End: 2022-07-29
Attending: INTERNAL MEDICINE
Payer: COMMERCIAL

## 2022-07-29 ENCOUNTER — TELEPHONE (OUTPATIENT)
Dept: CARDIOLOGY | Facility: CLINIC | Age: 82
End: 2022-07-29

## 2022-07-29 PROCEDURE — 93798 PHYS/QHP OP CAR RHAB W/ECG: CPT | Performed by: REHABILITATION PRACTITIONER

## 2022-07-29 NOTE — TELEPHONE ENCOUNTER
----- Message from Elizabeth Zuluaga RN sent at 7/29/2022  3:33 PM CDT -----  Regarding: new pacemaker 2022 - need access to [facility] records and future device check  Patient had a pacemaker implanted in [place] this March. He left the hospital and drove back to [place].  He does NOT have a PCP or cardiologist in [place].    Patient was sent home with a [email or web address] from [facility] [facility]  The wife filled out forms or sent a letter asking them to send his strips to [facility]  This did not happen    I send a request to get his current information to  [facility] in [place], [place] ([phone]) [phone]    Can your team talk to [facility] to have them release the [place] clinic and send his records to us going forward? He will need ongoing device checks    Thanks  Anita    Will ask Device techs to assist in this

## 2022-08-01 ENCOUNTER — HOSPITAL ENCOUNTER (OUTPATIENT)
Dept: CARDIAC REHAB | Facility: CLINIC | Age: 82
Discharge: HOME | End: 2022-08-01
Attending: INTERNAL MEDICINE
Payer: COMMERCIAL

## 2022-08-01 PROCEDURE — 93798 PHYS/QHP OP CAR RHAB W/ECG: CPT

## 2022-08-03 ENCOUNTER — HOSPITAL ENCOUNTER (OUTPATIENT)
Dept: CARDIAC REHAB | Facility: CLINIC | Age: 82
Discharge: HOME | End: 2022-08-03
Attending: INTERNAL MEDICINE
Payer: COMMERCIAL

## 2022-08-03 PROCEDURE — 93798 PHYS/QHP OP CAR RHAB W/ECG: CPT | Performed by: REHABILITATION PRACTITIONER

## 2022-08-04 ENCOUNTER — HOSPITAL ENCOUNTER (OUTPATIENT)
Dept: CARDIAC REHAB | Facility: CLINIC | Age: 82
Discharge: HOME | End: 2022-08-04
Attending: INTERNAL MEDICINE
Payer: COMMERCIAL

## 2022-08-04 ENCOUNTER — TELEPHONE (OUTPATIENT)
Dept: CARDIOLOGY | Facility: CLINIC | Age: 82
End: 2022-08-04

## 2022-08-04 PROCEDURE — 93798 PHYS/QHP OP CAR RHAB W/ECG: CPT | Performed by: REHABILITATION PRACTITIONER

## 2022-08-04 NOTE — TELEPHONE ENCOUNTER
Meghan from [facility] # [phone],  Fax #'s [phone] or [phone]  Called requesting a signed  JENAE for pacemaker records be sent on Clinic letter head.     Message sent to Team.

## 2022-08-04 NOTE — TELEPHONE ENCOUNTER
Called patient's spouse to review. JENAE form to be mailed today to their home and they will sign and mail it back.    Awaiting JENAE form to be faxed to [employer] @ Fax #'s [phone] or [phone] for OV on 8/16/2022 8/9/2022 patient returned signed JENAE form. JENAE and request for device strip faxed to [employer]. Copy of JENAE sent to scan

## 2022-08-08 ENCOUNTER — HOSPITAL ENCOUNTER (OUTPATIENT)
Dept: CARDIAC REHAB | Facility: CLINIC | Age: 82
Discharge: HOME | End: 2022-08-08
Attending: INTERNAL MEDICINE
Payer: COMMERCIAL

## 2022-08-08 PROCEDURE — 93798 PHYS/QHP OP CAR RHAB W/ECG: CPT

## 2022-08-10 ENCOUNTER — HOSPITAL ENCOUNTER (OUTPATIENT)
Dept: CARDIAC REHAB | Facility: CLINIC | Age: 82
Discharge: HOME | End: 2022-08-10
Attending: INTERNAL MEDICINE
Payer: COMMERCIAL

## 2022-08-10 PROCEDURE — 93798 PHYS/QHP OP CAR RHAB W/ECG: CPT

## 2022-08-12 ENCOUNTER — HOSPITAL ENCOUNTER (OUTPATIENT)
Dept: CARDIAC REHAB | Facility: CLINIC | Age: 82
Discharge: HOME | End: 2022-08-12
Attending: INTERNAL MEDICINE
Payer: COMMERCIAL

## 2022-08-12 PROCEDURE — 93798 PHYS/QHP OP CAR RHAB W/ECG: CPT

## 2022-08-15 ENCOUNTER — HOSPITAL ENCOUNTER (OUTPATIENT)
Dept: CARDIAC REHAB | Facility: CLINIC | Age: 82
Discharge: HOME | End: 2022-08-15
Attending: INTERNAL MEDICINE
Payer: COMMERCIAL

## 2022-08-15 PROCEDURE — 93798 PHYS/QHP OP CAR RHAB W/ECG: CPT | Performed by: OCCUPATIONAL THERAPIST

## 2022-08-16 ENCOUNTER — DOCUMENTATION ONLY (OUTPATIENT)
Dept: CARDIOLOGY | Facility: CLINIC | Age: 82
End: 2022-08-16

## 2022-08-16 ENCOUNTER — OFFICE VISIT (OUTPATIENT)
Dept: CARDIOLOGY | Facility: CLINIC | Age: 82
End: 2022-08-16
Attending: INTERNAL MEDICINE
Payer: COMMERCIAL

## 2022-08-16 ENCOUNTER — TRANSFERRED RECORDS (OUTPATIENT)
Dept: HEALTH INFORMATION MANAGEMENT | Facility: CLINIC | Age: 82
End: 2022-08-16

## 2022-08-16 VITALS
HEART RATE: 60 BPM | HEIGHT: 68 IN | BODY MASS INDEX: 35.39 KG/M2 | WEIGHT: 233.5 LBS | SYSTOLIC BLOOD PRESSURE: 129 MMHG | DIASTOLIC BLOOD PRESSURE: 75 MMHG | OXYGEN SATURATION: 97 %

## 2022-08-16 DIAGNOSIS — E78.00 PURE HYPERCHOLESTEROLEMIA: Chronic | ICD-10-CM

## 2022-08-16 DIAGNOSIS — I49.5 SICK SINUS SYNDROME (H): ICD-10-CM

## 2022-08-16 DIAGNOSIS — Z95.0 CARDIAC PACEMAKER IN SITU: ICD-10-CM

## 2022-08-16 DIAGNOSIS — E66.812 CLASS 2 OBESITY DUE TO EXCESS CALORIES WITHOUT SERIOUS COMORBIDITY WITH BODY MASS INDEX (BMI) OF 35.0 TO 35.9 IN ADULT: ICD-10-CM

## 2022-08-16 DIAGNOSIS — E66.09 CLASS 2 OBESITY DUE TO EXCESS CALORIES WITHOUT SERIOUS COMORBIDITY WITH BODY MASS INDEX (BMI) OF 35.0 TO 35.9 IN ADULT: ICD-10-CM

## 2022-08-16 DIAGNOSIS — I10 ESSENTIAL HYPERTENSION, BENIGN: ICD-10-CM

## 2022-08-16 DIAGNOSIS — I25.10 CORONARY ARTERY DISEASE INVOLVING NATIVE CORONARY ARTERY OF NATIVE HEART WITHOUT ANGINA PECTORIS: Primary | ICD-10-CM

## 2022-08-16 DIAGNOSIS — N18.31 CHRONIC RENAL FAILURE, STAGE 3A (H): ICD-10-CM

## 2022-08-16 DIAGNOSIS — R06.09 DOE (DYSPNEA ON EXERTION): ICD-10-CM

## 2022-08-16 DIAGNOSIS — G25.0 ESSENTIAL TREMOR: ICD-10-CM

## 2022-08-16 PROCEDURE — 99215 OFFICE O/P EST HI 40 MIN: CPT | Performed by: INTERNAL MEDICINE

## 2022-08-16 NOTE — PROGRESS NOTES
Post-OV 8/16/2022: sent request to EP device team to move patient's device care to  [facility].  Faxed request for recent lipids and office note to [facility].  Faxed 4th request for 2022 device strips to [facility] [employer].

## 2022-08-16 NOTE — PROGRESS NOTES
Service Date: 08/16/2022    HISTORY OF PRESENT ILLNESS:  Tony is a delightful 82-year-old gentleman with past medical history significant for labile hypertension, hypercholesterolemia, central obesity, inferior wall myocardial infarction with stenting of his right coronary in 2001, stenting of his left anterior descending artery because of unstable angina in 2005 with rescue angioplasty of his first diagonal.      More recently while in [place], he had reportedly sick sinus syndrome and received a pacemaker.      He had COVID in 03/2020.  Upon return this spring, Tony thought he had worsening dyspnea on exertion.  He had no chest, arm, neck, jaw or shoulder discomfort.  His activity is mostly limited by debilitating back pain.  [facility] [place] demonstrated a small reversible defect in the basilar to mid inferior wall.  Ejection fraction was 63% without focal wall motion abnormalities.  BNP was 174.      His last echocardiogram performed in 09/2021 demonstrated ejection fraction of 60%-65% without focal wall motion abnormalities, no significant valvular pathology and normal pulmonary pressures.    Because of his abnormal stress test, we took him to the Cath Lab, which demonstrated what appeared to be a chronically occluded right coronary artery filled by left to right collaterals.  He did have a distal left main stenosis in the 25% range.  The proximal LAD appeared to have a stenosis in the 50% -60% range.  The iFR of the left anterior descending artery returned at 0.85.  In an attempt to perform angioplasty of the proximal ostial LAD, I was unable to advance a balloon and I decided we would set up as a CHIP case as it is undoubtably because a calcium would require more aggressive intervention.    We added Imdur 15 mg to his regimen and Plavix and started him in Cardiac Rehab.  When he returned for followup, he thought he was feeling significantly better and  did not want to follow up with the [facility] team.    Tony returns to clinic and stated he thinks he continues to do well.  He thinks he has gotten better since doing the Cardiac Rehab and would like to continue to do this.  He has no chest, arm, neck, jaw or shoulder discomfort.  He would like to trim his medical regimen down if he could.    They are also frustrated with the fact that they have been unable to change their pacemaker remote contacts to us and I have talked to my nurses and the Device Clinic and they have equally been frustrated with dealing with the [place] [facility].  We have had the proper paperwork signed and mailed to them, but still having switched over as of yet.    As stated, Tony is having no chest, arm, neck, jaw or shoulder discomfort.  He thinks his exercise tolerance has improved somewhat, but still mostly limited by his back problems.  He is not having any peripheral edema.  He is not having any palpitations, lightheadedness, dizziness, syncope or near syncope, although he does state his blood pressure oftentimes will drop low during Cardiac Rehab.    ASSESSMENT AND PLAN:  Tony has quite a bit of bruising.  I have told him we can stop his aspirin and just stay on Plavix.  They asked about a back injection he wants to get and I told them we could temporarily hold his Plavix for 1 week, then he can get his back injection and then we can reinitiate his Plavix as appropriate.    At this time, I am going to stop his Imdur as he is not having any gómez chest pain.  I did review his angiogram again personally.  He does have at least moderate disease involving his ostial and proximal LAD that is calcified, but at this time as he is doing well, we will continue with medical management.  We will see what his blood pressure does on his treadmill.  Whether this is a sign of a significant area of ischemia, although his [facility] nuclear [facility] pointed to his inferior wall, which makes sense as that is occluded  filling by collaterals.    Blood pressure is well controlled today at 129/75 with a pulse of 60.    Weight is 233 pounds, which is down from his previous 246.  I congratulated him on this, but body mass index is still 35.5 and I have encouraged him to continue to lose weight.    F[facility] shows total cholesterol 155 and HDL of 52 and LDL of 82 and triglycerides of 103.  This is a bit of a back slide from our .  I suspect this may be decreased activity as his weight is better.    We will again try to get his pacemaker device checks sent to us and us to be his primary for his pacemaker.    Creatinine is 0.84 with normal electrolytes.    We reviewed all of his medications.  He talks about fatigue and tiredness and I told him this may be a side effect of his propranolol, which he is on for his intention tremors.  We did try switching the metoprolol in the past, but his tremors got much worse.    We will continue the remainder of his medications as is.    Nabeel Allred MD, [facility]        D: 2022   T: 2022   MT: DAYAN    Name:     SHANA PAYNE  MRN:      [id number]        Account:      [id number]   :      1940           Service Date: 2022       Document: [id number]

## 2022-08-16 NOTE — LETTER
8/16/2022    [facility]  [facility] [address]    RE: Tony Bruce       Dear Colleague,     I had the pleasure of seeing Tony Bruce in the [facility].  HPI and Plan:   See dictation    Today's clinic visit entailed:  Review of external notes as documented elsewhere in note  Review of the result(s) of each unique test - EKG, echocardiogram, lab work, coronary angiogram  The following tests were independently interpreted by me as noted in my documentation: Coronary angiogram  Ordering of each unique test  Prescription drug management  40 minutes spent on the date of the encounter doing chart review, history and exam, documentation and further activities per the note  Provider  Link to OLSET Help Grid     The level of medical decision making during this visit was of high complexity.      Orders Placed This Encounter   Procedures     Basic metabolic panel     Lipid Profile     ALT     Follow-Up with Cardiology       No orders of the defined types were placed in this encounter.      Medications Discontinued During This Encounter   Medication Reason     aspirin (ASA) 81 MG EC tablet      isosorbide mononitrate (IMDUR) 30 MG 24 hr tablet      aspirin 81 MG tablet          Encounter Diagnoses   Name Primary?     Coronary artery disease involving native coronary artery of native heart without angina pectoris Yes     Pure hypercholesterolemia      Essential hypertension, benign      Class 2 obesity due to excess calories without serious comorbidity with body mass index (BMI) of 35.0 to 35.9 in adult      Chronic renal failure, stage 3a (H)      Cardiac pacemaker in situ      Essential tremor      Sick sinus syndrome (H)      ÁLVAREZ (dyspnea on exertion)        CURRENT MEDICATIONS:  Current Outpatient Medications   Medication Sig Dispense Refill     acetaminophen 500 MG CAPS 2 tablets twice a day       amoxicillin (AMOXIL) 500 MG capsule Take 500 mg by mouth 3 times  "daily Before dental work       atorvastatin (LIPITOR) 40 MG tablet Take 1 tablet (40 mg) by mouth daily 90 tablet 3     Calcium Carb-Cholecalciferol (CALCIUM-VITAMIN D3) 250-125 MG-UNIT TABS Take 2 tablets by mouth 2 times daily       celecoxib (CELEBREX) 100 MG capsule Take 100 mg by mouth 2 times daily       clopidogrel (PLAVIX) 75 MG tablet Take 1 tablet (75 mg) by mouth daily 15 tablet 0     cyanocobalamin (VITAMIN B-12) 1000 MCG tablet Take by mouth daily       diclofenac (VOLTAREN) 1 % GEL topical gel Place onto the skin as needed for moderate pain       diltiazem ER (DILT-XR) 180 MG 24 hr capsule Take 180 mg by mouth daily       docusate sodium (COLACE) 100 MG capsule Take 100 mg by mouth 2 times daily as needed for constipation       Emollient (VANICREAM EX) APPLY THIN LAYER    TWICE A DAY FOR DRY SKIN       finasteride (PROSCAR) 5 MG tablet Take 5 mg by mouth daily       folic acid (FOLVITE) 1 MG tablet Take 1 mg by mouth daily       furosemide (LASIX) 20 MG tablet Take 1 tablet (20 mg) by mouth daily 30 tablet 3     hydrocortisone 2.5 % cream Apply topically 2 times daily       lidocaine (LIDODERM) 5 % Patch Place 1 patch onto the skin       linaclotide (LINZESS) 145 MCG capsule Take by mouth every morning (before breakfast)       losartan (COZAAR) 50 MG tablet Take 1 tablet (50 mg) by mouth daily 90 tablet 3     nitroGLYcerin (NITROSTAT) 0.4 MG sublingual tablet One tablet under the tongue every 5 minutes if needed for chest pain. May repeat every 5 minutes for a maximum of 3 doses in 15 minutes\" 25 tablet 3     omega 3 1000 MG CAPS Take by mouth daily       omeprazole (PRILOSEC) 20 MG DR capsule Take 20 mg by mouth daily       polyethylene glycol (MIRALAX/GLYCOLAX) Packet Take 1 packet by mouth as needed for constipation       primidone (MYSOLINE) 250 MG tablet Take 250 mg by mouth 2 times daily       propranolol (INDERAL) 10 MG tablet Take 10 mg by mouth daily as needed       propranolol ER (INDERAL LA) " 120 MG 24 hr capsule Take 60 mg by mouth daily        psyllium 0.52 g capsule Take 1 capsule by mouth daily       sodium fluoride dental gel (PREVIDENT) 1.1 % GEL topical gel Apply to affected area At Bedtime       spironolactone (ALDACTONE) 25 MG tablet Take 1 tablet (25 mg) by mouth daily 90 tablet 3     tamsulosin (FLOMAX) 0.4 MG capsule Take 0.4 mg by mouth daily       traMADol (ULTRAM) 50 MG tablet TAKE ONE TABLET BY MOUTH TWICE A DAY AS NEEDED FOR LOW BACK PAIN       Urea 40 % CREA        valACYclovir (VALTREX) 1000 mg tablet TAKE TWO TABLETS BY MOUTH TWICE A DAY       Vitamin D, Cholecalciferol, 1000 units CAPS Take by mouth daily         ALLERGIES     Allergies   Allergen Reactions     Lisinopril      Morphine      confusion       PAST MEDICAL HISTORY:  Past Medical History:   Diagnosis Date     Coronary atherosclerosis of unspecified type of vessel, native or graft 08/2001    cardiac cath 5/2022: unsuccessful attempt to LAD-medical management; cath 2005: GER to LAD; cath 2002: GER to RCA     Essential hypertension, benign     INTERMITTENT HYPERTENSION     Essential tremor      Generalized osteoarthrosis, unspecified site      Hyperlipidaemia      Hypertrophy of prostate without urinary obstruction and other lower urinary tract symptoms (LUTS)     BPH - Dr Pinto     Impotence of organic origin     Dr Pinto - Dr Allred     NONSPECIFIC MEDICAL HISTORY     CERVICAL/LUMBAR RADICULOPATHY     NONSPECIFIC MEDICAL HISTORY     SHOULDER IMPINGEMENT     NSTEMI (non-ST elevated myocardial infarction) (H)     inferior 2001     Obese      Other and unspecified hyperlipidemia     ?     Peripheral neuropathy      Sick sinus syndrome (H)     s/p [employer] pacemaker implanted March 2022 in [place]       PAST SURGICAL HISTORY:  Past Surgical History:   Procedure Laterality Date     CARDIAC NUC DANISHA STRESS TEST NL  4/09    normal     COLONOSCOPY  6/06    normal - diverticulosis - dr adams     COLONOSCOPY  8/14/2012    Procedure:  COLONOSCOPY;  COLONOSCOPY SCREENING/ 6 YEAR FOLLOW UP;  Surgeon: Rey Small MD;  Location:  GI     CV CORONARY ANGIOGRAM N/A 5/25/2022    Procedure: Coronary Angiogram;  Surgeon: Nabeel Allred MD;  Location:  HEART CARDIAC CATH LAB     CV INSTANTANEOUS WAVE-FREE RATIO N/A 5/25/2022    Procedure: Instantaneous Wave-Free Ratio;  Surgeon: Nabeel Allred MD;  Location:  HEART CARDIAC CATH LAB     CV LEFT HEART CATH N/A 5/25/2022    Procedure: Left Heart Catheterization;  Surgeon: Nabeel Allred MD;  Location:  HEART CARDIAC CATH LAB     CV PCI N/A 5/25/2022    Procedure: Percutaneous Coronary Intervention;  Surgeon: Nabeel Allred MD;  Location:  HEART CARDIAC CATH LAB     HC REPAIR UMBILICAL CHIKA,5+Y/O,REDUC  9/04    dr dominique     SURGICAL HISTORY OF -   1990    S/P CERVICAL DISCECTOMY x 2     SURGICAL HISTORY OF -   1990    S/P LUMBAR DISCECTOMY x 2     SURGICAL HISTORY OF -   1993    S/P LT CARPAL TUNNEL SURG/SHOULDER IMPINGEMENT     SURGICAL HISTORY OF -   8/01    S/P STENT OF RT CORONARY ARTERY     SURGICAL HISTORY OF -   9/05    Drug eluting stent to LAD     ZZC TOTAL KNEE ARTHROPLASTY  7/04    Knee Replacement, Total - LEFT Dr Regan       FAMILY HISTORY:  Family History   Problem Relation Age of Onset     Respiratory Father         COPD     Cerebrovascular Disease Mother         CVA     C.A.D. Brother         CABG - after CABG x 3 - rheumatic fever as a child     Breast Cancer Sister      Cerebrovascular Disease Sister        SOCIAL HISTORY:  Social History     Socioeconomic History     Marital status:      Spouse name: paola     Number of children: 4     Years of education: 14   Tobacco Use     Smoking status: Never Smoker     Smokeless tobacco: Never Used   Substance and Sexual Activity     Alcohol use: No     Drug use: No     Sexual activity: Yes     Partners: Female   Other Topics Concern     Caffeine Concern Yes     Comment: 3-4 cups qd     Exercise Yes  "    Comment: limited     Seat Belt Yes       Review of Systems:  Skin:  Positive for bruising     Eyes:  Negative      ENT:  Negative      Respiratory:  Negative       Cardiovascular:  chest pain;palpitations;Negative;edema;dizziness;lightheadedness;fatigue      Gastroenterology: Positive for constipation    Genitourinary:  Negative      Musculoskeletal:  Positive for back pain chronic  Neurologic:  Positive for headaches occasional headaches at night  Psychiatric:  Negative      Heme/Lymph/Imm:  Positive for allergies    Endocrine:  Negative        Physical Exam:  Vitals: /75 (BP Location: Left arm, Patient Position: Sitting)   Pulse 60   Ht 1.727 m (5' 8\")   Wt 105.9 kg (233 lb 8 oz)   SpO2 97%   BMI 35.50 kg/m      Constitutional:  cooperative, alert and oriented, well developed, well nourished, in no acute distress obese      Skin:  warm and dry to the touch, no apparent skin lesions or masses noted          Head:  normocephalic, no masses or lesions        Eyes:  pupils equal and round, conjunctivae and lids unremarkable, sclera white, no xanthalasma, EOMS intact, no nystagmus        Lymph:      ENT:  no pallor or cyanosis, dentition good        Neck:  carotid pulses are full and equal bilaterally        Respiratory:  normal breath sounds, clear to auscultation, normal A-P diameter, normal symmetry, normal respiratory excursion, no use of accessory muscles         Cardiac: regular rhythm;normal S1 and S2;no S3 or S4       systolic murmur;RUSB;grade 1        pulses full and equal                                        GI:    obese      Extremities and Muscular Skeletal:  no edema;no spinal abnormalities noted;normal muscle strength and tone              Neurological:  no gross motor deficits   Resting tremor    Psych:  affect appropriate, oriented to time, person and place        CC  Nabeel Allred MD  [address]  [place] [id number]                Service Date: 08/16/2022    HISTORY OF " PRESENT ILLNESS:  Tony is a delightful 82-year-old gentleman with past medical history significant for labile hypertension, hypercholesterolemia, central obesity, inferior wall myocardial infarction with stenting of his right coronary in 2001, stenting of his left anterior descending artery because of unstable angina in 2005 with rescue angioplasty of his first diagonal.      More recently while in [place], he had reportedly sick sinus syndrome and received a pacemaker.      He had COVID in 03/2020.  Upon return this spring, Tony thought he had worsening dyspnea on exertion.  He had no chest, arm, neck, jaw or shoulder discomfort.  His activity is mostly limited by debilitating back pain.  [facility] [place] demonstrated a small reversible defect in the basilar to mid inferior wall.  Ejection fraction was 63% without focal wall motion abnormalities.  BNP was 174.      His last echocardiogram performed in 09/2021 demonstrated ejection fraction of 60%-65% without focal wall motion abnormalities, no significant valvular pathology and normal pulmonary pressures.    Because of his abnormal stress test, we took him to the Cath Lab, which demonstrated what appeared to be a chronically occluded right coronary artery filled by left to right collaterals.  He did have a distal left main stenosis in the 25% range.  The proximal LAD appeared to have a stenosis in the 50% -60% range.  The iFR of the left anterior descending artery returned at 0.85.  In an attempt to perform angioplasty of the proximal ostial LAD, I was unable to advance a balloon and I decided we would set up as a CHIP case as it is undoubtably because a calcium would require more aggressive intervention.    We added Imdur 15 mg to his regimen and Plavix and started him in Cardiac Rehab.  When he returned for followup, he thought he was feeling significantly better and did not want to follow up with the CHIP  team.    Tony returns to clinic and stated he thinks he continues to do well.  He thinks he has gotten better since doing the Cardiac Rehab and would like to continue to do this.  He has no chest, arm, neck, jaw or shoulder discomfort.  He would like to trim his medical regimen down if he could.    They are also frustrated with the fact that they have been unable to change their pacemaker remote contacts to us and I have talked to my nurses and the [facility] and they have equally been frustrated with dealing with the [place] [facility].  We have had the proper paperwork signed and mailed to them, but still having switched over as of yet.    As stated, Tony is having no chest, arm, neck, jaw or shoulder discomfort.  He thinks his exercise tolerance has improved somewhat, but still mostly limited by his back problems.  He is not having any peripheral edema.  He is not having any palpitations, lightheadedness, dizziness, syncope or near syncope, although he does state his blood pressure oftentimes will drop low during Cardiac Rehab.    ASSESSMENT AND PLAN:  Tony has quite a bit of bruising.  I have told him we can stop his aspirin and just stay on Plavix.  They asked about a back injection he wants to get and I told them we could temporarily hold his Plavix for 1 week, then he can get his back injection and then we can reinitiate his Plavix as appropriate.    At this time, I am going to stop his Imdur as he is not having any gómez chest pain.  I did review his angiogram again personally.  He does have at least moderate disease involving his ostial and proximal LAD that is calcified, but at this time as he is doing well, we will continue with medical management.  We will see what his blood pressure does on his treadmill.  Whether this is a sign of a significant area of ischemia, although [facility] pointed to his inferior wall, which makes sense as that is occluded filling by collaterals.    Blood pressure  is well controlled today at 129/75 with a pulse of 60.    Weight is 233 pounds, which is down from his previous 246.  I congratulated him on this, but body mass index is still 35.5 and I have encouraged him to continue to lose weight.    [facility] shows total cholesterol 155 and HDL of 52 and LDL of 82 and triglycerides of 103.  This is a bit of a back slide from our .  I suspect this may be decreased activity as his weight is better.    We will again try to get his pacemaker device checks sent to us and us to be his primary for his pacemaker.    Creatinine is 0.84 with normal electrolytes.    We reviewed all of his medications.  He talks about fatigue and tiredness and I told him this may be a side effect of his propranolol, which he is on for his intention tremors.  We did try switching the metoprolol in the past, but his tremors got much worse.    We will continue the remainder of his medications as is.    Nabeel Allred MD, [facility]        D: 2022   T: 2022   MT: DAYAN    Name:     SHANA PAYNE  MRN:      [id number]        Account:      [id number]   :      1940           Service Date: 2022       Document: [id number]  Thank you for allowing me to participate in the care of your patient.      Sincerely,     Nabeel Allred MD     [facility]  cc:   Nabeel Allred MD  [address]  [place] [id number]

## 2022-08-16 NOTE — PROGRESS NOTES
HPI and Plan:   See dictation    Today's clinic visit entailed:  Review of external notes as documented elsewhere in note  Review of the result(s) of each unique test - EKG, echocardiogram, lab work, coronary angiogram  The following tests were independently interpreted by me as noted in my documentation: Coronary angiogram  Ordering of each unique test  Prescription drug management  40 minutes spent on the date of the encounter doing chart review, history and exam, documentation and further activities per the note  Provider  Link to MDM Help Grid     The level of medical decision making during this visit was of high complexity.      Orders Placed This Encounter   Procedures     Basic metabolic panel     Lipid Profile     ALT     Follow-Up with Cardiology       No orders of the defined types were placed in this encounter.      Medications Discontinued During This Encounter   Medication Reason     aspirin (ASA) 81 MG EC tablet      isosorbide mononitrate (IMDUR) 30 MG 24 hr tablet      aspirin 81 MG tablet          Encounter Diagnoses   Name Primary?     Coronary artery disease involving native coronary artery of native heart without angina pectoris Yes     Pure hypercholesterolemia      Essential hypertension, benign      Class 2 obesity due to excess calories without serious comorbidity with body mass index (BMI) of 35.0 to 35.9 in adult      Chronic renal failure, stage 3a (H)      Cardiac pacemaker in situ      Essential tremor      Sick sinus syndrome (H)      ÁLVAREZ (dyspnea on exertion)        CURRENT MEDICATIONS:  Current Outpatient Medications   Medication Sig Dispense Refill     acetaminophen 500 MG CAPS 2 tablets twice a day       amoxicillin (AMOXIL) 500 MG capsule Take 500 mg by mouth 3 times daily Before dental work       atorvastatin (LIPITOR) 40 MG tablet Take 1 tablet (40 mg) by mouth daily 90 tablet 3     Calcium Carb-Cholecalciferol (CALCIUM-VITAMIN D3) 250-125 MG-UNIT TABS Take 2 tablets by mouth 2  "times daily       celecoxib (CELEBREX) 100 MG capsule Take 100 mg by mouth 2 times daily       clopidogrel (PLAVIX) 75 MG tablet Take 1 tablet (75 mg) by mouth daily 15 tablet 0     cyanocobalamin (VITAMIN B-12) 1000 MCG tablet Take by mouth daily       diclofenac (VOLTAREN) 1 % GEL topical gel Place onto the skin as needed for moderate pain       diltiazem ER (DILT-XR) 180 MG 24 hr capsule Take 180 mg by mouth daily       docusate sodium (COLACE) 100 MG capsule Take 100 mg by mouth 2 times daily as needed for constipation       Emollient (VANICREAM EX) APPLY THIN LAYER    TWICE A DAY FOR DRY SKIN       finasteride (PROSCAR) 5 MG tablet Take 5 mg by mouth daily       folic acid (FOLVITE) 1 MG tablet Take 1 mg by mouth daily       furosemide (LASIX) 20 MG tablet Take 1 tablet (20 mg) by mouth daily 30 tablet 3     hydrocortisone 2.5 % cream Apply topically 2 times daily       lidocaine (LIDODERM) 5 % Patch Place 1 patch onto the skin       linaclotide (LINZESS) 145 MCG capsule Take by mouth every morning (before breakfast)       losartan (COZAAR) 50 MG tablet Take 1 tablet (50 mg) by mouth daily 90 tablet 3     nitroGLYcerin (NITROSTAT) 0.4 MG sublingual tablet One tablet under the tongue every 5 minutes if needed for chest pain. May repeat every 5 minutes for a maximum of 3 doses in 15 minutes\" 25 tablet 3     omega 3 1000 MG CAPS Take by mouth daily       omeprazole (PRILOSEC) 20 MG DR capsule Take 20 mg by mouth daily       polyethylene glycol (MIRALAX/GLYCOLAX) Packet Take 1 packet by mouth as needed for constipation       primidone (MYSOLINE) 250 MG tablet Take 250 mg by mouth 2 times daily       propranolol (INDERAL) 10 MG tablet Take 10 mg by mouth daily as needed       propranolol ER (INDERAL LA) 120 MG 24 hr capsule Take 60 mg by mouth daily        psyllium 0.52 g capsule Take 1 capsule by mouth daily       sodium fluoride dental gel (PREVIDENT) 1.1 % GEL topical gel Apply to affected area At Bedtime       " spironolactone (ALDACTONE) 25 MG tablet Take 1 tablet (25 mg) by mouth daily 90 tablet 3     tamsulosin (FLOMAX) 0.4 MG capsule Take 0.4 mg by mouth daily       traMADol (ULTRAM) 50 MG tablet TAKE ONE TABLET BY MOUTH TWICE A DAY AS NEEDED FOR LOW BACK PAIN       Urea 40 % CREA        valACYclovir (VALTREX) 1000 mg tablet TAKE TWO TABLETS BY MOUTH TWICE A DAY       Vitamin D, Cholecalciferol, 1000 units CAPS Take by mouth daily         ALLERGIES     Allergies   Allergen Reactions     Lisinopril      Morphine      confusion       PAST MEDICAL HISTORY:  Past Medical History:   Diagnosis Date     Coronary atherosclerosis of unspecified type of vessel, native or graft 08/2001    cardiac cath 5/2022: unsuccessful attempt to LAD-medical management; cath 2005: GER to LAD; cath 2002: GER to RCA     Essential hypertension, benign     INTERMITTENT HYPERTENSION     Essential tremor      Generalized osteoarthrosis, unspecified site      Hyperlipidaemia      Hypertrophy of prostate without urinary obstruction and other lower urinary tract symptoms (LUTS)     BPH - Dr Pinto     Impotence of organic origin     Dr Pinto - Dr Allred     NONSPECIFIC MEDICAL HISTORY     CERVICAL/LUMBAR RADICULOPATHY     NONSPECIFIC MEDICAL HISTORY     SHOULDER IMPINGEMENT     NSTEMI (non-ST elevated myocardial infarction) (H)     inferior 2001     Obese      Other and unspecified hyperlipidemia     ?     Peripheral neuropathy      Sick sinus syndrome (H)     s/p [facility] [facility] pacemaker implanted March 2022 in [place]       PAST SURGICAL HISTORY:  Past Surgical History:   Procedure Laterality Date     CARDIAC NUC DANISHA STRESS TEST NL  4/09    normal     COLONOSCOPY  6/06    normal - diverticulosis - dr adams     COLONOSCOPY  8/14/2012    Procedure: COLONOSCOPY;  COLONOSCOPY SCREENING/ 6 YEAR FOLLOW UP;  Surgeon: Rey Adams MD;  Location:  GI     CV CORONARY ANGIOGRAM N/A 5/25/2022    Procedure: Coronary Angiogram;  Surgeon: Nabeel Allred MD;   Location:  HEART CARDIAC CATH LAB     CV INSTANTANEOUS WAVE-FREE RATIO N/A 5/25/2022    Procedure: Instantaneous Wave-Free Ratio;  Surgeon: Nabeel Allred MD;  Location:  HEART CARDIAC CATH LAB     CV LEFT HEART CATH N/A 5/25/2022    Procedure: Left Heart Catheterization;  Surgeon: Nabeel Allred MD;  Location:  HEART CARDIAC CATH LAB     CV PCI N/A 5/25/2022    Procedure: Percutaneous Coronary Intervention;  Surgeon: Nabeel Allred MD;  Location:  HEART CARDIAC CATH LAB     HC REPAIR UMBILICAL CHIKA,5+Y/O,REDUC  9/04    dr dominique     SURGICAL HISTORY OF -   1990    S/P CERVICAL DISCECTOMY x 2     SURGICAL HISTORY OF -   1990    S/P LUMBAR DISCECTOMY x 2     SURGICAL HISTORY OF -   1993    S/P LT CARPAL TUNNEL SURG/SHOULDER IMPINGEMENT     SURGICAL HISTORY OF -   8/01    S/P STENT OF RT CORONARY ARTERY     SURGICAL HISTORY OF -   9/05    Drug eluting stent to LAD     ZZC TOTAL KNEE ARTHROPLASTY  7/04    Knee Replacement, Total - LEFT Dr Regan       FAMILY HISTORY:  Family History   Problem Relation Age of Onset     Respiratory Father         COPD     Cerebrovascular Disease Mother         CVA     C.A.D. Brother         CABG - after CABG x 3 - rheumatic fever as a child     Breast Cancer Sister      Cerebrovascular Disease Sister        SOCIAL HISTORY:  Social History     Socioeconomic History     Marital status:      Spouse name: paola     Number of children: 4     Years of education: 14   Tobacco Use     Smoking status: Never Smoker     Smokeless tobacco: Never Used   Substance and Sexual Activity     Alcohol use: No     Drug use: No     Sexual activity: Yes     Partners: Female   Other Topics Concern     Caffeine Concern Yes     Comment: 3-4 cups qd     Exercise Yes     Comment: limited     Seat Belt Yes       Review of Systems:  Skin:  Positive for bruising     Eyes:  Negative      ENT:  Negative      Respiratory:  Negative       Cardiovascular:  chest  "pain;palpitations;Negative;edema;dizziness;lightheadedness;fatigue      Gastroenterology: Positive for constipation    Genitourinary:  Negative      Musculoskeletal:  Positive for back pain chronic  Neurologic:  Positive for headaches occasional headaches at night  Psychiatric:  Negative      Heme/Lymph/Imm:  Positive for allergies    Endocrine:  Negative        Physical Exam:  Vitals: /75 (BP Location: Left arm, Patient Position: Sitting)   Pulse 60   Ht 1.727 m (5' 8\")   Wt 105.9 kg (233 lb 8 oz)   SpO2 97%   BMI 35.50 kg/m      Constitutional:  cooperative, alert and oriented, well developed, well nourished, in no acute distress obese      Skin:  warm and dry to the touch, no apparent skin lesions or masses noted          Head:  normocephalic, no masses or lesions        Eyes:  pupils equal and round, conjunctivae and lids unremarkable, sclera white, no xanthalasma, EOMS intact, no nystagmus        Lymph:      ENT:  no pallor or cyanosis, dentition good        Neck:  carotid pulses are full and equal bilaterally        Respiratory:  normal breath sounds, clear to auscultation, normal A-P diameter, normal symmetry, normal respiratory excursion, no use of accessory muscles         Cardiac: regular rhythm;normal S1 and S2;no S3 or S4       systolic murmur;RUSB;grade 1        pulses full and equal                                        GI:    obese      Extremities and Muscular Skeletal:  no edema;no spinal abnormalities noted;normal muscle strength and tone              Neurological:  no gross motor deficits   Resting tremor    Psych:  affect appropriate, oriented to time, person and place        CC  Nabeel Allred MD  [address]  [place] [id number]              "

## 2022-08-19 ENCOUNTER — HOSPITAL ENCOUNTER (OUTPATIENT)
Dept: CARDIAC REHAB | Facility: CLINIC | Age: 82
Discharge: HOME | End: 2022-08-19
Attending: INTERNAL MEDICINE
Payer: COMMERCIAL

## 2022-08-19 PROCEDURE — 93798 PHYS/QHP OP CAR RHAB W/ECG: CPT | Performed by: OCCUPATIONAL THERAPIST

## 2022-08-22 ENCOUNTER — HOSPITAL ENCOUNTER (OUTPATIENT)
Dept: CARDIAC REHAB | Facility: CLINIC | Age: 82
Discharge: HOME | End: 2022-08-22
Attending: INTERNAL MEDICINE
Payer: COMMERCIAL

## 2022-08-22 PROCEDURE — 93798 PHYS/QHP OP CAR RHAB W/ECG: CPT

## 2022-08-24 ENCOUNTER — HOSPITAL ENCOUNTER (OUTPATIENT)
Dept: CARDIAC REHAB | Facility: CLINIC | Age: 82
Discharge: HOME | End: 2022-08-24
Attending: INTERNAL MEDICINE
Payer: COMMERCIAL

## 2022-08-24 PROCEDURE — 93798 PHYS/QHP OP CAR RHAB W/ECG: CPT

## 2022-08-26 ENCOUNTER — HOSPITAL ENCOUNTER (OUTPATIENT)
Dept: CARDIAC REHAB | Facility: CLINIC | Age: 82
Discharge: HOME | End: 2022-08-26
Attending: INTERNAL MEDICINE
Payer: COMMERCIAL

## 2022-08-26 PROCEDURE — 93798 PHYS/QHP OP CAR RHAB W/ECG: CPT | Performed by: REHABILITATION PRACTITIONER

## 2022-09-12 ENCOUNTER — TELEPHONE (OUTPATIENT)
Dept: CARDIOLOGY | Facility: CLINIC | Age: 82
End: 2022-09-12

## 2022-09-12 NOTE — TELEPHONE ENCOUNTER
Call from patient's spouse: she is not having any success in moving the patient's device care from [place] to [facility]. She has called the [facility] contact phone on their home device ([phone]) multiple times asking that their strips and device care be moved to [facility]. She has signed forms more than once.  Wife states she went to [facility] in [place] before they left for [place] and signed forms to move the records and future care to [facility] and Dr. Allred.  Team 2 sent the signed JENAE form and requests to [facility] once the patient return to [place] with no response.    Spouse states [place] are heading [place] winter and would like to be sure the [facility] is in charge of the patient's device before they leave on Oct 16.    Will message Device team to review

## 2022-09-14 ENCOUNTER — HOSPITAL ENCOUNTER (OUTPATIENT)
Dept: CARDIAC REHAB | Facility: CLINIC | Age: 82
Discharge: HOME | End: 2022-09-14
Attending: INTERNAL MEDICINE
Payer: COMMERCIAL

## 2022-09-14 PROCEDURE — 93798 PHYS/QHP OP CAR RHAB W/ECG: CPT | Performed by: REHABILITATION PRACTITIONER

## 2022-09-16 ENCOUNTER — HOSPITAL ENCOUNTER (OUTPATIENT)
Dept: CARDIAC REHAB | Facility: CLINIC | Age: 82
Discharge: HOME | End: 2022-09-16
Attending: INTERNAL MEDICINE
Payer: COMMERCIAL

## 2022-09-16 PROCEDURE — 93798 PHYS/QHP OP CAR RHAB W/ECG: CPT | Performed by: REHABILITATION PRACTITIONER

## 2022-09-19 ENCOUNTER — HOSPITAL ENCOUNTER (OUTPATIENT)
Dept: CARDIAC REHAB | Facility: CLINIC | Age: 82
Discharge: HOME | End: 2022-09-19
Attending: INTERNAL MEDICINE
Payer: COMMERCIAL

## 2022-09-19 PROCEDURE — 93798 PHYS/QHP OP CAR RHAB W/ECG: CPT

## 2022-09-21 ENCOUNTER — HOSPITAL ENCOUNTER (OUTPATIENT)
Dept: CARDIAC REHAB | Facility: CLINIC | Age: 82
Discharge: HOME | End: 2022-09-21
Attending: INTERNAL MEDICINE
Payer: COMMERCIAL

## 2022-09-21 PROCEDURE — 93798 PHYS/QHP OP CAR RHAB W/ECG: CPT | Performed by: REHABILITATION PRACTITIONER

## 2022-09-23 ENCOUNTER — HOSPITAL ENCOUNTER (OUTPATIENT)
Dept: CARDIAC REHAB | Facility: CLINIC | Age: 82
Discharge: HOME | End: 2022-09-23
Attending: INTERNAL MEDICINE
Payer: COMMERCIAL

## 2022-09-23 PROCEDURE — 93798 PHYS/QHP OP CAR RHAB W/ECG: CPT

## 2022-09-26 ENCOUNTER — HOSPITAL ENCOUNTER (OUTPATIENT)
Dept: CARDIAC REHAB | Facility: CLINIC | Age: 82
Discharge: HOME | End: 2022-09-26
Attending: INTERNAL MEDICINE
Payer: COMMERCIAL

## 2022-09-26 PROCEDURE — 93798 PHYS/QHP OP CAR RHAB W/ECG: CPT

## 2022-09-29 ENCOUNTER — HOSPITAL ENCOUNTER (OUTPATIENT)
Dept: CARDIAC REHAB | Facility: CLINIC | Age: 82
Discharge: HOME | End: 2022-09-29
Attending: INTERNAL MEDICINE
Payer: COMMERCIAL

## 2022-09-29 PROCEDURE — 93797 PHYS/QHP OP CAR RHAB WO ECG: CPT | Performed by: OCCUPATIONAL THERAPIST

## 2022-09-29 PROCEDURE — 93798 PHYS/QHP OP CAR RHAB W/ECG: CPT | Performed by: OCCUPATIONAL THERAPIST

## 2023-05-15 ENCOUNTER — TELEPHONE (OUTPATIENT)
Dept: CARDIOLOGY | Facility: CLINIC | Age: 83
End: 2023-05-15
Payer: COMMERCIAL

## 2023-05-15 NOTE — TELEPHONE ENCOUNTER
Pt had called wanting to know if they needed to bring remote monitor in to appointment.  Stated that there was no need.  Explained a little bit about how remotes worked.  Will call clinic with further questions.

## 2023-05-17 ENCOUNTER — OFFICE VISIT (OUTPATIENT)
Dept: CARDIOLOGY | Facility: CLINIC | Age: 83
End: 2023-05-17
Attending: INTERNAL MEDICINE
Payer: COMMERCIAL

## 2023-05-17 VITALS
HEART RATE: 80 BPM | DIASTOLIC BLOOD PRESSURE: 79 MMHG | BODY MASS INDEX: 35.31 KG/M2 | WEIGHT: 233 LBS | SYSTOLIC BLOOD PRESSURE: 121 MMHG | HEIGHT: 68 IN | OXYGEN SATURATION: 96 %

## 2023-05-17 DIAGNOSIS — E66.812 CLASS 2 OBESITY DUE TO EXCESS CALORIES WITHOUT SERIOUS COMORBIDITY WITH BODY MASS INDEX (BMI) OF 35.0 TO 35.9 IN ADULT: ICD-10-CM

## 2023-05-17 DIAGNOSIS — I49.5 SICK SINUS SYNDROME (H): Primary | ICD-10-CM

## 2023-05-17 DIAGNOSIS — I48.0 PAF (PAROXYSMAL ATRIAL FIBRILLATION) (H): ICD-10-CM

## 2023-05-17 DIAGNOSIS — N18.31 CHRONIC RENAL FAILURE, STAGE 3A (H): ICD-10-CM

## 2023-05-17 DIAGNOSIS — R06.09 DOE (DYSPNEA ON EXERTION): ICD-10-CM

## 2023-05-17 DIAGNOSIS — Z95.0 CARDIAC PACEMAKER IN SITU: ICD-10-CM

## 2023-05-17 DIAGNOSIS — E66.09 CLASS 2 OBESITY DUE TO EXCESS CALORIES WITHOUT SERIOUS COMORBIDITY WITH BODY MASS INDEX (BMI) OF 35.0 TO 35.9 IN ADULT: ICD-10-CM

## 2023-05-17 DIAGNOSIS — I10 ESSENTIAL HYPERTENSION, BENIGN: ICD-10-CM

## 2023-05-17 DIAGNOSIS — I25.10 CORONARY ARTERY DISEASE INVOLVING NATIVE CORONARY ARTERY OF NATIVE HEART WITHOUT ANGINA PECTORIS: ICD-10-CM

## 2023-05-17 DIAGNOSIS — I49.5 SICK SINUS SYNDROME (H): ICD-10-CM

## 2023-05-17 DIAGNOSIS — G47.33 OSA (OBSTRUCTIVE SLEEP APNEA): Chronic | ICD-10-CM

## 2023-05-17 DIAGNOSIS — I25.10 CORONARY ARTERY DISEASE INVOLVING NATIVE CORONARY ARTERY OF NATIVE HEART WITHOUT ANGINA PECTORIS: Primary | ICD-10-CM

## 2023-05-17 PROCEDURE — 93280 PM DEVICE PROGR EVAL DUAL: CPT | Performed by: INTERNAL MEDICINE

## 2023-05-17 PROCEDURE — 99215 OFFICE O/P EST HI 40 MIN: CPT | Performed by: INTERNAL MEDICINE

## 2023-05-17 NOTE — LETTER
5/17/2023    [facility]  [address] [address]    RE: Tony Bruce       Dear Colleague,     I had the pleasure of seeing Tony Bruce in the [facility].  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    Sick sinus syndrome and a pacemaker placed in [place]. Obstructive sleep apnea for which he does not use a CPAP mask.     He had COVID in 03/2020.  Upon return this spring, Tony thought he had worsening dyspnea on exertion.  He had no chest, arm, neck, jaw or shoulder discomfort.  His activity is mostly limited by debilitating back pain.  [facility] [place] demonstrated a small reversible defect in the basilar to mid inferior wall.  Ejection fraction was 63% without focal wall motion abnormalities.  BNP was 174.       His last echocardiogram performed in 09/2021 demonstrated ejection fraction of 60%-65% without focal wall motion abnormalities, no significant valvular pathology and normal pulmonary pressures.     Because of his abnormal stress test, we took him to the Cath Lab, which demonstrated what appeared to be a chronically occluded right coronary artery filled by left to right collaterals.  He did have a distal left main stenosis in the 25% range.  The proximal LAD appeared to have a stenosis in the 50% -60% range.  The iFR of the left anterior descending artery returned at 0.85.  In an attempt to perform angioplasty of the proximal ostial LAD, I was unable to advance a balloon and I decided we would set up as a CHIP case as it is undoubtably because a calcium would require more  aggressive intervention.     We added Imdur 15 mg to his regimen and Plavix and started him in Cardiac Rehab.  When he returned for followup, he thought he was feeling significantly better and did not want to follow up with the [facility] team.     Tony returns to clinic and stated he thinks he continues to do well.  He thinks he has gotten better since doing the Cardiac Rehab and would like to continue to do this.  He has no chest, arm, neck, jaw or shoulder discomfort.  He would like to trim his medical regimen down if he could.     They are also frustrated with the fact that they have been unable to change their pacemaker remote contacts to us and I have talked to my nurses and the Device Clinic and they have equally been frustrated with dealing with the [place] [facility].  We have had the proper paperwork signed and mailed to them, but still having switched over as of yet.     As stated, Tony is having no chest, arm, neck, jaw or shoulder discomfort.  He thinks his exercise tolerance has improved somewhat, but still mostly limited by his back problems.  He is not having any peripheral edema.  He is not having any palpitations, lightheadedness, dizziness, syncope or near syncope.    Interrogation of his device does show frequent mode switches with episodes of A-fib lasting up to 8 hours.    ASSESSMENT AND PLAN:   Tony does not appear to be having any symptoms despite his known anatomy.  We will continue with medical management.    Tony is main symptoms at this time are fatigue tiredness daytime somnolence probably all related to his untreated obstructive sleep apnea.  He has not followed up in years.  [facility] and he will talk to his primary care doctor about whether he wants to consider going to a study and consider a CPAP mask.  He states last time he did not even take it out of the box.    Tony's heart failure appears to be well compensated.    Given his A-fib noted on his pacemaker we  decided we should be anticoagulated.  We discussed his various options.  At this time I recommended Eliquis 5 mg twice daily.  [facility] will [facility].  I do not know what their preferred anticoagulant is.  I told him to stop his clopidogrel when he receives his anticoagulant.  [facility] will [facility].    Tony [facility] the [facility] which shows normal creatinine liver function test electrolytes.  Unfortunately does not include a fasting lipid profile.  He states he is got his annual physical coming up soon and will get an fasting lipid profile at that time.     Blood pressure is well controlled today at 129/75 with a pulse of 60.     Weight is 233 pounds, which is down from his previous 246.  I congratulated him on this, but body mass index is still 35.5 and I have encouraged him to continue to lose weight.     We will again try to get his pacemaker device checks sent to us and us to be his primary for his pacemaker.       We reviewed all of his medications.  We will continue the remainder of his medications as is.    I will have him follow-up with my ROBYN in 6 months I will see him back in a year if he should have any problems would be glad to see him sooner.  He will follow through the device clinic on a quarterly basis.     Nabeel Allred MD, [facility]          Today's clinic visit entailed:  Review of the result(s) of each unique test - Device interrogation, [facility] lab work.  Ordering of each unique test  Prescription drug management  45 minutes spent by me on the date of the encounter doing chart review, history and exam, documentation and further activities per the note  Provider  Link to [facility] Help Grid     The level of medical decision making during this visit was of moderate complexity.      Orders Placed This Encounter   Procedures    Basic metabolic panel    Lipid Profile    ALT    Follow-Up with Cardiology    Follow-Up with Cardiology ROBYN       Orders Placed This  Encounter   Medications    apixaban ANTICOAGULANT (ELIQUIS ANTICOAGULANT) 5 MG tablet     Sig: Take 1 tablet (5 mg) by mouth 2 times daily     Dispense:  180 tablet     Refill:  4       Medications Discontinued During This Encounter   Medication Reason    clopidogrel (PLAVIX) 75 MG tablet          Encounter Diagnoses   Name Primary?    Coronary artery disease involving native coronary artery of native heart without angina pectoris Yes    PAF (paroxysmal atrial fibrillation) (H)     Essential hypertension, benign     Cardiac pacemaker in situ     Sick sinus syndrome (H)     ÁLVAREZ (dyspnea on exertion)     Class 2 obesity due to excess calories without serious comorbidity with body mass index (BMI) of 35.0 to 35.9 in adult     Chronic renal failure, stage 3a (H)     LIZANDRO (obstructive sleep apnea)        CURRENT MEDICATIONS:  Current Outpatient Medications   Medication Sig Dispense Refill    acetaminophen 500 MG CAPS 2 tablets twice a day      amoxicillin (AMOXIL) 500 MG capsule Take 500 mg by mouth 3 times daily Before dental work      apixaban ANTICOAGULANT (ELIQUIS ANTICOAGULANT) 5 MG tablet Take 1 tablet (5 mg) by mouth 2 times daily 180 tablet 4    atorvastatin (LIPITOR) 40 MG tablet Take 1 tablet (40 mg) by mouth daily 90 tablet 3    Calcium Carb-Cholecalciferol (CALCIUM-VITAMIN D3) 250-125 MG-UNIT TABS Take 2 tablets by mouth 2 times daily      celecoxib (CELEBREX) 100 MG capsule Take 100 mg by mouth 2 times daily      cyanocobalamin (VITAMIN B-12) 1000 MCG tablet Take by mouth daily      diclofenac (VOLTAREN) 1 % GEL topical gel Place onto the skin as needed for moderate pain      diltiazem ER (DILT-XR) 180 MG 24 hr capsule Take 180 mg by mouth daily      docusate sodium (COLACE) 100 MG capsule Take 100 mg by mouth 2 times daily as needed for constipation      Emollient (VANICREAM EX) APPLY THIN LAYER    TWICE A DAY FOR DRY SKIN      finasteride (PROSCAR) 5 MG tablet Take 5 mg by mouth daily      folic acid  "(FOLVITE) 1 MG tablet Take 1 mg by mouth daily      furosemide (LASIX) 20 MG tablet Take 1 tablet (20 mg) by mouth daily 30 tablet 3    hydrocortisone 2.5 % cream Apply topically 2 times daily      lidocaine (LIDODERM) 5 % Patch Place 1 patch onto the skin      linaclotide (LINZESS) 145 MCG capsule Take by mouth every morning (before breakfast)      losartan (COZAAR) 50 MG tablet Take 1 tablet (50 mg) by mouth daily 90 tablet 3    nitroGLYcerin (NITROSTAT) 0.4 MG sublingual tablet One tablet under the tongue every 5 minutes if needed for chest pain. May repeat every 5 minutes for a maximum of 3 doses in 15 minutes\" 25 tablet 3    omega 3 1000 MG CAPS Take by mouth daily      omeprazole (PRILOSEC) 20 MG DR capsule Take 20 mg by mouth daily      polyethylene glycol (MIRALAX/GLYCOLAX) Packet Take 1 packet by mouth as needed for constipation      primidone (MYSOLINE) 250 MG tablet Take 250 mg by mouth 2 times daily      propranolol ER (INDERAL LA) 120 MG 24 hr capsule Take 60 mg by mouth daily       psyllium 0.52 g capsule Take 1 capsule by mouth daily      sodium fluoride dental gel (PREVIDENT) 1.1 % GEL topical gel Apply to affected area At Bedtime      spironolactone (ALDACTONE) 25 MG tablet Take 1 tablet (25 mg) by mouth daily 90 tablet 3    tamsulosin (FLOMAX) 0.4 MG capsule Take 0.4 mg by mouth daily      Vitamin D, Cholecalciferol, 1000 units CAPS Take by mouth daily      propranolol (INDERAL) 10 MG tablet Take 10 mg by mouth daily as needed (Patient not taking: Reported on 5/17/2023)      traMADol (ULTRAM) 50 MG tablet TAKE ONE TABLET BY MOUTH TWICE A DAY AS NEEDED FOR LOW BACK PAIN (Patient not taking: Reported on 5/17/2023)      Urea 40 % CREA  (Patient not taking: Reported on 5/17/2023)      valACYclovir (VALTREX) 1000 mg tablet TAKE TWO TABLETS BY MOUTH TWICE A DAY (Patient not taking: Reported on 5/17/2023)         ALLERGIES     Allergies   Allergen Reactions    Lisinopril     Morphine      confusion "       PAST MEDICAL HISTORY:  Past Medical History:   Diagnosis Date    Coronary atherosclerosis of unspecified type of vessel, native or graft 08/2001    cardiac cath 5/2022: unsuccessful attempt to LAD-medical management; cath 2005: GER to LAD; cath 2002: GER to RCA    Essential hypertension, benign     INTERMITTENT HYPERTENSION    Essential tremor     Generalized osteoarthrosis, unspecified site     Hyperlipidaemia     Hypertrophy of prostate without urinary obstruction and other lower urinary tract symptoms (LUTS)     BPH - Dr Pinto    Impotence of organic origin     Dr Pinto - Dr Allred    NONSPECIFIC MEDICAL HISTORY     CERVICAL/LUMBAR RADICULOPATHY    NONSPECIFIC MEDICAL HISTORY     SHOULDER IMPINGEMENT    NSTEMI (non-ST elevated myocardial infarction) (H)     inferior 2001    Obese     Other and unspecified hyperlipidemia     ?    Peripheral neuropathy     Sick sinus syndrome (H)     s/p [employer] pacemaker implanted March 2022 in [place]       PAST SURGICAL HISTORY:  Past Surgical History:   Procedure Laterality Date    CARDIAC NUC DANISHA STRESS TEST NL  4/09    normal    COLONOSCOPY  6/06    normal - diverticulosis - dr adams    COLONOSCOPY  8/14/2012    Procedure: COLONOSCOPY;  COLONOSCOPY SCREENING/ 6 YEAR FOLLOW UP;  Surgeon: Rey Adams MD;  Location:  GI    CV CORONARY ANGIOGRAM N/A 5/25/2022    Procedure: Coronary Angiogram;  Surgeon: Nabeel Allred MD;  Location:  HEART CARDIAC CATH LAB    CV INSTANTANEOUS WAVE-FREE RATIO N/A 5/25/2022    Procedure: Instantaneous Wave-Free Ratio;  Surgeon: Nabeel Allred MD;  Location:  HEART CARDIAC CATH LAB    CV LEFT HEART CATH N/A 5/25/2022    Procedure: Left Heart Catheterization;  Surgeon: Nabeel Allred MD;  Location:  HEART CARDIAC CATH LAB    CV PCI N/A 5/25/2022    Procedure: Percutaneous Coronary Intervention;  Surgeon: Nabeel Allred MD;  Location:  HEART CARDIAC CATH LAB    SURGICAL HISTORY OF -   1990    S/P CERVICAL  "DISCECTOMY x 2    SURGICAL HISTORY OF -   1990    S/P LUMBAR DISCECTOMY x 2    SURGICAL HISTORY OF -   1993    S/P LT CARPAL TUNNEL SURG/SHOULDER IMPINGEMENT    SURGICAL HISTORY OF -   8/01    S/P STENT OF RT CORONARY ARTERY    SURGICAL HISTORY OF -   9/05    Drug eluting stent to LAD    ZZC TOTAL KNEE ARTHROPLASTY  7/04    Knee Replacement, Total - LEFT Dr Regan    ZZHC REPAIR UMBILICAL CHIKA,5+Y/O,REDUC  9/04    dr dominique       FAMILY HISTORY:  Family History   Problem Relation Age of Onset    Respiratory Father         COPD    Cerebrovascular Disease Mother         CVA    C.A.D. Brother         CABG - after CABG x 3 - rheumatic fever as a child    Breast Cancer Sister     Cerebrovascular Disease Sister        SOCIAL HISTORY:  Social History     Socioeconomic History    Marital status:      Spouse name: paola    Number of children: 4    Years of education: 14    Highest education level: None   Tobacco Use    Smoking status: Never    Smokeless tobacco: Never   Substance and Sexual Activity    Alcohol use: No    Drug use: No    Sexual activity: Yes     Partners: Female   Other Topics Concern    Caffeine Concern Yes     Comment: 3-4 cups qd    Exercise Yes     Comment: limited    Seat Belt Yes       Review of Systems:  Skin:  Positive for bruising     Eyes:  Positive for double vision    ENT:  Positive for hearing loss    Respiratory:  Positive for shortness of breath SOB upon exertion   Cardiovascular:  Negative for;palpitations;chest pain;lightheadedness;dizziness;edema fatigue;Positive for Pt states he experiences chest tightness when he walks for too long  Gastroenterology: Positive for constipation    Genitourinary:  Negative      Musculoskeletal:  Positive for neck pain;arthritis;back pain neck pain and tingling  Neurologic:  Negative      Psychiatric:  Negative      Heme/Lymph/Imm:  Negative      Endocrine:  Negative        Physical Exam:  Vitals: /79   Pulse 80   Ht 1.727 m (5' 8\")   Wt " 105.7 kg (233 lb)   SpO2 96%   BMI 35.43 kg/m      Constitutional:  cooperative, alert and oriented, well developed, well nourished, in no acute distress obese      Skin:  warm and dry to the touch, no apparent skin lesions or masses noted          Head:  normocephalic, no masses or lesions        Eyes:  pupils equal and round, conjunctivae and lids unremarkable, sclera white, no xanthalasma, EOMS intact, no nystagmus        Lymph:      ENT:  no pallor or cyanosis, dentition good        Neck:  carotid pulses are full and equal bilaterally        Respiratory:  normal breath sounds, clear to auscultation, normal A-P diameter, normal symmetry, normal respiratory excursion, no use of accessory muscles         Cardiac: regular rhythm;normal S1 and S2;no S3 or S4       systolic murmur;RUSB;grade 1        pulses full and equal                                        GI:    obese      Extremities and Muscular Skeletal:  no edema;no spinal abnormalities noted;normal muscle strength and tone              Neurological:  no gross motor deficits   Resting tremor    Psych:  affect appropriate, oriented to time, person and place        CC  Nabeel Allred MD  [address] [address]                  Thank you for allowing me to participate in the care of your patient.      Sincerely,     Nabeel Allred MD     [facility]

## 2023-05-23 ENCOUNTER — TELEPHONE (OUTPATIENT)
Dept: CARDIOLOGY | Facility: CLINIC | Age: 83
End: 2023-05-23
Payer: COMMERCIAL

## 2023-05-23 NOTE — TELEPHONE ENCOUNTER
Health Call Center    Phone Message    May a detailed message be left on voicemail: no     Reason for Call: Other:    Nacho from [facility] called and they have released patient from there care. We can pick patient up for any care and device checks. If any questions please call Nacho at [phone]    Action Taken: Other: Cardiology     Travel Screening: Not Applicable     Thank you!  [facility]

## 2023-06-05 LAB
MDC_IDC_LEAD_IMPLANT_DT: NORMAL
MDC_IDC_LEAD_IMPLANT_DT: NORMAL
MDC_IDC_LEAD_LOCATION: NORMAL
MDC_IDC_LEAD_LOCATION: NORMAL
MDC_IDC_LEAD_LOCATION_DETAIL_1: NORMAL
MDC_IDC_LEAD_LOCATION_DETAIL_1: NORMAL
MDC_IDC_LEAD_MFG: NORMAL
MDC_IDC_LEAD_MFG: NORMAL
MDC_IDC_LEAD_MODEL: NORMAL
MDC_IDC_LEAD_MODEL: NORMAL
MDC_IDC_LEAD_POLARITY_TYPE: NORMAL
MDC_IDC_LEAD_POLARITY_TYPE: NORMAL
MDC_IDC_LEAD_SERIAL: NORMAL
MDC_IDC_LEAD_SERIAL: NORMAL
MDC_IDC_MSMT_BATTERY_REMAINING_LONGEVITY: 66 MO
MDC_IDC_MSMT_BATTERY_STATUS: NORMAL
MDC_IDC_MSMT_BATTERY_VOLTAGE: 2.98 V
MDC_IDC_MSMT_LEADCHNL_RA_IMPEDANCE_VALUE: 400 OHM
MDC_IDC_MSMT_LEADCHNL_RA_SENSING_INTR_AMPL: 1.1 MV
MDC_IDC_MSMT_LEADCHNL_RV_IMPEDANCE_VALUE: 537.5 OHM
MDC_IDC_MSMT_LEADCHNL_RV_PACING_THRESHOLD_AMPLITUDE: 0.75 V
MDC_IDC_MSMT_LEADCHNL_RV_PACING_THRESHOLD_AMPLITUDE: 0.75 V
MDC_IDC_MSMT_LEADCHNL_RV_PACING_THRESHOLD_PULSEWIDTH: 0.4 MS
MDC_IDC_MSMT_LEADCHNL_RV_PACING_THRESHOLD_PULSEWIDTH: 0.4 MS
MDC_IDC_MSMT_LEADCHNL_RV_SENSING_INTR_AMPL: 10 MV
MDC_IDC_PG_IMPLANT_DTM: NORMAL
MDC_IDC_PG_MFG: NORMAL
MDC_IDC_PG_MODEL: NORMAL
MDC_IDC_PG_SERIAL: NORMAL
MDC_IDC_PG_TYPE: NORMAL
MDC_IDC_SESS_CLINIC_NAME: NORMAL
MDC_IDC_SESS_DTM: NORMAL
MDC_IDC_SESS_TYPE: NORMAL
MDC_IDC_SET_BRADY_AT_MODE_SWITCH_MODE: NORMAL
MDC_IDC_SET_BRADY_AT_MODE_SWITCH_RATE: 180 {BEATS}/MIN
MDC_IDC_SET_BRADY_HYSTRATE: NORMAL
MDC_IDC_SET_BRADY_LOWRATE: 60 {BEATS}/MIN
MDC_IDC_SET_BRADY_MAX_SENSOR_RATE: 130 {BEATS}/MIN
MDC_IDC_SET_BRADY_MAX_TRACKING_RATE: 120 {BEATS}/MIN
MDC_IDC_SET_BRADY_MODE: NORMAL
MDC_IDC_SET_BRADY_NIGHT_RATE: NORMAL
MDC_IDC_SET_BRADY_PAV_DELAY_LOW: 200 MS
MDC_IDC_SET_BRADY_SAV_DELAY_LOW: 170 MS
MDC_IDC_SET_LEADCHNL_RA_PACING_AMPLITUDE: 3.5 V
MDC_IDC_SET_LEADCHNL_RA_PACING_CAPTURE_MODE: NORMAL
MDC_IDC_SET_LEADCHNL_RA_PACING_POLARITY: NORMAL
MDC_IDC_SET_LEADCHNL_RA_PACING_PULSEWIDTH: 0.4 MS
MDC_IDC_SET_LEADCHNL_RA_SENSING_ADAPTATION_MODE: NORMAL
MDC_IDC_SET_LEADCHNL_RA_SENSING_POLARITY: NORMAL
MDC_IDC_SET_LEADCHNL_RV_PACING_AMPLITUDE: 3.5 V
MDC_IDC_SET_LEADCHNL_RV_PACING_CAPTURE_MODE: NORMAL
MDC_IDC_SET_LEADCHNL_RV_PACING_POLARITY: NORMAL
MDC_IDC_SET_LEADCHNL_RV_PACING_PULSEWIDTH: 0.4 MS
MDC_IDC_SET_LEADCHNL_RV_SENSING_POLARITY: NORMAL
MDC_IDC_SET_LEADCHNL_RV_SENSING_SENSITIVITY: 2 MV
MDC_IDC_STAT_AT_MODE_SW_COUNT: NORMAL
MDC_IDC_STAT_BRADY_RA_PERCENT_PACED: 63 %
MDC_IDC_STAT_BRADY_RV_PERCENT_PACED: 39 %

## 2023-08-21 ENCOUNTER — TELEPHONE (OUTPATIENT)
Dept: CARDIOLOGY | Facility: CLINIC | Age: 83
End: 2023-08-21

## 2023-08-21 NOTE — TELEPHONE ENCOUNTER
Wife calls frantic that the scheduled transmission didn't come through today.     Called pt and wife back. Attempted to trouble shoot to get it to come over. Machine started up and started beeping loudly until unplugged. Gave them number to [facility] to trouble shoot.     Wife called back upset that we didn't know his monitor needed an adapter   Explained that:  We have only seen pt in clinic 1 time in May. At that time, we had zero information on the pt or his device. Remote was scheduled and pt was to send transmission as he previously did from home, if we don't get it, we will call. pt was informed of this at that time.   We set up 3mo appts. If the transmission does not come through, we call as to not lose the pt to follow up.  We did this. We are able to trouble shoot and escalate to the company if we are not successful, as we did.   As there are many devices, we wouldn't automatically know what monitor they have at home.     Pt instructed to call when they receive the adapter to set up another transmission. TARIK Lopez

## 2023-08-28 ENCOUNTER — TELEPHONE (OUTPATIENT)
Dept: CARDIOLOGY | Facility: CLINIC | Age: 83
End: 2023-08-28
Payer: OTHER GOVERNMENT

## 2023-08-28 NOTE — TELEPHONE ENCOUNTER
PT had called letting us know that they had received a new wireless adapter for their remote.  Scheduled a new remote check.      Also asked if he should bring his remote down to [place].  Stated that he should because he will be staying there for 6 months.     Will call with further questions.

## 2023-09-06 ENCOUNTER — ANCILLARY PROCEDURE (OUTPATIENT)
Dept: CARDIOLOGY | Facility: CLINIC | Age: 83
End: 2023-09-06
Attending: INTERNAL MEDICINE
Payer: COMMERCIAL

## 2023-09-06 DIAGNOSIS — Z95.0 CARDIAC PACEMAKER IN SITU: ICD-10-CM

## 2023-09-06 DIAGNOSIS — I49.5 SICK SINUS SYNDROME (H): ICD-10-CM

## 2023-09-06 DIAGNOSIS — I25.10 CORONARY ARTERY DISEASE INVOLVING NATIVE CORONARY ARTERY OF NATIVE HEART WITHOUT ANGINA PECTORIS: ICD-10-CM

## 2023-09-06 PROCEDURE — 93296 REM INTERROG EVL PM/IDS: CPT | Performed by: INTERNAL MEDICINE

## 2023-09-06 PROCEDURE — 93294 REM INTERROG EVL PM/LDLS PM: CPT | Performed by: INTERNAL MEDICINE

## 2023-09-08 LAB
MDC_IDC_EPISODE_DTM: NORMAL
MDC_IDC_EPISODE_DURATION: 10 S
MDC_IDC_EPISODE_DURATION: 10 S
MDC_IDC_EPISODE_DURATION: 12 S
MDC_IDC_EPISODE_DURATION: 4 S
MDC_IDC_EPISODE_DURATION: 4 S
MDC_IDC_EPISODE_DURATION: 44 S
MDC_IDC_EPISODE_DURATION: 6 S
MDC_IDC_EPISODE_DURATION: 8 S
MDC_IDC_EPISODE_DURATION: NORMAL S
MDC_IDC_EPISODE_ID: NORMAL
MDC_IDC_EPISODE_TYPE: NORMAL
MDC_IDC_LEAD_IMPLANT_DT: NORMAL
MDC_IDC_LEAD_IMPLANT_DT: NORMAL
MDC_IDC_LEAD_LOCATION: NORMAL
MDC_IDC_LEAD_LOCATION: NORMAL
MDC_IDC_LEAD_LOCATION_DETAIL_1: NORMAL
MDC_IDC_LEAD_LOCATION_DETAIL_1: NORMAL
MDC_IDC_LEAD_MFG: NORMAL
MDC_IDC_LEAD_MFG: NORMAL
MDC_IDC_LEAD_MODEL: NORMAL
MDC_IDC_LEAD_MODEL: NORMAL
MDC_IDC_LEAD_POLARITY_TYPE: NORMAL
MDC_IDC_LEAD_POLARITY_TYPE: NORMAL
MDC_IDC_LEAD_SERIAL: NORMAL
MDC_IDC_LEAD_SERIAL: NORMAL
MDC_IDC_MSMT_BATTERY_DTM: NORMAL
MDC_IDC_MSMT_BATTERY_REMAINING_LONGEVITY: 56 MO
MDC_IDC_MSMT_BATTERY_REMAINING_PERCENTAGE: 81 %
MDC_IDC_MSMT_BATTERY_RRT_TRIGGER: NORMAL
MDC_IDC_MSMT_BATTERY_STATUS: NORMAL
MDC_IDC_MSMT_BATTERY_VOLTAGE: 2.99 V
MDC_IDC_MSMT_LEADCHNL_RA_IMPEDANCE_VALUE: 400 OHM
MDC_IDC_MSMT_LEADCHNL_RA_LEAD_CHANNEL_STATUS: NORMAL
MDC_IDC_MSMT_LEADCHNL_RA_PACING_THRESHOLD_AMPLITUDE: 0.5 V
MDC_IDC_MSMT_LEADCHNL_RA_PACING_THRESHOLD_PULSEWIDTH: 0.4 MS
MDC_IDC_MSMT_LEADCHNL_RA_SENSING_INTR_AMPL: 3.7 MV
MDC_IDC_MSMT_LEADCHNL_RV_IMPEDANCE_VALUE: 490 OHM
MDC_IDC_MSMT_LEADCHNL_RV_LEAD_CHANNEL_STATUS: NORMAL
MDC_IDC_MSMT_LEADCHNL_RV_PACING_THRESHOLD_AMPLITUDE: 0.75 V
MDC_IDC_MSMT_LEADCHNL_RV_PACING_THRESHOLD_PULSEWIDTH: 0.4 MS
MDC_IDC_MSMT_LEADCHNL_RV_SENSING_INTR_AMPL: 8.3 MV
MDC_IDC_PG_IMPLANT_DTM: NORMAL
MDC_IDC_PG_MFG: NORMAL
MDC_IDC_PG_MODEL: NORMAL
MDC_IDC_PG_SERIAL: NORMAL
MDC_IDC_PG_TYPE: NORMAL
MDC_IDC_SESS_CLINIC_NAME: NORMAL
MDC_IDC_SESS_DTM: NORMAL
MDC_IDC_SESS_REPROGRAMMED: NO
MDC_IDC_SESS_TYPE: NORMAL
MDC_IDC_SET_BRADY_AT_MODE_SWITCH_MODE: NORMAL
MDC_IDC_SET_BRADY_AT_MODE_SWITCH_RATE: 180 {BEATS}/MIN
MDC_IDC_SET_BRADY_LOWRATE: 60 {BEATS}/MIN
MDC_IDC_SET_BRADY_MAX_SENSOR_RATE: 130 {BEATS}/MIN
MDC_IDC_SET_BRADY_MAX_TRACKING_RATE: 120 {BEATS}/MIN
MDC_IDC_SET_BRADY_MODE: NORMAL
MDC_IDC_SET_BRADY_PAV_DELAY_LOW: 200 MS
MDC_IDC_SET_BRADY_SAV_DELAY_LOW: 170 MS
MDC_IDC_SET_LEADCHNL_RA_PACING_AMPLITUDE: 3.5 V
MDC_IDC_SET_LEADCHNL_RA_PACING_ANODE_ELECTRODE_1: NORMAL
MDC_IDC_SET_LEADCHNL_RA_PACING_ANODE_LOCATION_1: NORMAL
MDC_IDC_SET_LEADCHNL_RA_PACING_CAPTURE_MODE: NORMAL
MDC_IDC_SET_LEADCHNL_RA_PACING_CATHODE_ELECTRODE_1: NORMAL
MDC_IDC_SET_LEADCHNL_RA_PACING_CATHODE_LOCATION_1: NORMAL
MDC_IDC_SET_LEADCHNL_RA_PACING_POLARITY: NORMAL
MDC_IDC_SET_LEADCHNL_RA_PACING_PULSEWIDTH: 0.4 MS
MDC_IDC_SET_LEADCHNL_RA_SENSING_ADAPTATION_MODE: NORMAL
MDC_IDC_SET_LEADCHNL_RA_SENSING_ANODE_ELECTRODE_1: NORMAL
MDC_IDC_SET_LEADCHNL_RA_SENSING_ANODE_LOCATION_1: NORMAL
MDC_IDC_SET_LEADCHNL_RA_SENSING_CATHODE_ELECTRODE_1: NORMAL
MDC_IDC_SET_LEADCHNL_RA_SENSING_CATHODE_LOCATION_1: NORMAL
MDC_IDC_SET_LEADCHNL_RA_SENSING_POLARITY: NORMAL
MDC_IDC_SET_LEADCHNL_RA_SENSING_SENSITIVITY: 0.5 MV
MDC_IDC_SET_LEADCHNL_RV_PACING_AMPLITUDE: 3.5 V
MDC_IDC_SET_LEADCHNL_RV_PACING_ANODE_ELECTRODE_1: NORMAL
MDC_IDC_SET_LEADCHNL_RV_PACING_ANODE_LOCATION_1: NORMAL
MDC_IDC_SET_LEADCHNL_RV_PACING_CAPTURE_MODE: NORMAL
MDC_IDC_SET_LEADCHNL_RV_PACING_CATHODE_ELECTRODE_1: NORMAL
MDC_IDC_SET_LEADCHNL_RV_PACING_CATHODE_LOCATION_1: NORMAL
MDC_IDC_SET_LEADCHNL_RV_PACING_POLARITY: NORMAL
MDC_IDC_SET_LEADCHNL_RV_PACING_PULSEWIDTH: 0.4 MS
MDC_IDC_SET_LEADCHNL_RV_SENSING_ADAPTATION_MODE: NORMAL
MDC_IDC_SET_LEADCHNL_RV_SENSING_ANODE_ELECTRODE_1: NORMAL
MDC_IDC_SET_LEADCHNL_RV_SENSING_ANODE_LOCATION_1: NORMAL
MDC_IDC_SET_LEADCHNL_RV_SENSING_CATHODE_ELECTRODE_1: NORMAL
MDC_IDC_SET_LEADCHNL_RV_SENSING_CATHODE_LOCATION_1: NORMAL
MDC_IDC_SET_LEADCHNL_RV_SENSING_POLARITY: NORMAL
MDC_IDC_SET_LEADCHNL_RV_SENSING_SENSITIVITY: 2 MV
MDC_IDC_STAT_AT_BURDEN_PERCENT: 7 %
MDC_IDC_STAT_AT_DTM_END: NORMAL
MDC_IDC_STAT_AT_DTM_START: NORMAL
MDC_IDC_STAT_AT_MODE_SW_COUNT: 3564
MDC_IDC_STAT_AT_MODE_SW_COUNT_PER_DAY: 32
MDC_IDC_STAT_AT_MODE_SW_MAX_DURATION: NORMAL S
MDC_IDC_STAT_AT_MODE_SW_PERCENT_TIME: 8 %
MDC_IDC_STAT_BRADY_AP_VP_PERCENT: 26 %
MDC_IDC_STAT_BRADY_AP_VS_PERCENT: 42 %
MDC_IDC_STAT_BRADY_AS_VP_PERCENT: 4.6 %
MDC_IDC_STAT_BRADY_AS_VS_PERCENT: 27 %
MDC_IDC_STAT_BRADY_DTM_END: NORMAL
MDC_IDC_STAT_BRADY_DTM_START: NORMAL
MDC_IDC_STAT_BRADY_RA_PERCENT_PACED: 62 %
MDC_IDC_STAT_BRADY_RV_PERCENT_PACED: 37 %
MDC_IDC_STAT_CRT_DTM_END: NORMAL
MDC_IDC_STAT_CRT_DTM_START: NORMAL
MDC_IDC_STAT_HEART_RATE_ATRIAL_MAX: 330 {BEATS}/MIN
MDC_IDC_STAT_HEART_RATE_ATRIAL_MEAN: 126 {BEATS}/MIN
MDC_IDC_STAT_HEART_RATE_ATRIAL_MIN: 50 {BEATS}/MIN
MDC_IDC_STAT_HEART_RATE_DTM_END: NORMAL
MDC_IDC_STAT_HEART_RATE_DTM_START: NORMAL
MDC_IDC_STAT_HEART_RATE_VENTRICULAR_MAX: 200 {BEATS}/MIN
MDC_IDC_STAT_HEART_RATE_VENTRICULAR_MEAN: 69 {BEATS}/MIN
MDC_IDC_STAT_HEART_RATE_VENTRICULAR_MIN: 40 {BEATS}/MIN

## 2024-01-17 ENCOUNTER — ANCILLARY PROCEDURE (OUTPATIENT)
Dept: CARDIOLOGY | Facility: CLINIC | Age: 84
End: 2024-01-17
Attending: INTERNAL MEDICINE
Payer: COMMERCIAL

## 2024-01-17 DIAGNOSIS — I25.10 CORONARY ARTERY DISEASE INVOLVING NATIVE CORONARY ARTERY OF NATIVE HEART WITHOUT ANGINA PECTORIS: ICD-10-CM

## 2024-01-17 DIAGNOSIS — I49.5 SICK SINUS SYNDROME (H): ICD-10-CM

## 2024-01-17 DIAGNOSIS — Z95.0 CARDIAC PACEMAKER IN SITU: ICD-10-CM

## 2024-01-17 PROCEDURE — 93294 REM INTERROG EVL PM/LDLS PM: CPT | Performed by: INTERNAL MEDICINE

## 2024-01-17 PROCEDURE — 93296 REM INTERROG EVL PM/IDS: CPT | Performed by: INTERNAL MEDICINE

## 2024-02-01 LAB
MDC_IDC_EPISODE_DTM: NORMAL
MDC_IDC_EPISODE_DURATION: 16 S
MDC_IDC_EPISODE_DURATION: 26 S
MDC_IDC_EPISODE_DURATION: 40 S
MDC_IDC_EPISODE_DURATION: 42 S
MDC_IDC_EPISODE_DURATION: NORMAL S
MDC_IDC_EPISODE_ID: NORMAL
MDC_IDC_EPISODE_TYPE: NORMAL
MDC_IDC_LEAD_CONNECTION_STATUS: NORMAL
MDC_IDC_LEAD_CONNECTION_STATUS: NORMAL
MDC_IDC_LEAD_IMPLANT_DT: NORMAL
MDC_IDC_LEAD_IMPLANT_DT: NORMAL
MDC_IDC_LEAD_LOCATION: NORMAL
MDC_IDC_LEAD_LOCATION: NORMAL
MDC_IDC_LEAD_LOCATION_DETAIL_1: NORMAL
MDC_IDC_LEAD_LOCATION_DETAIL_1: NORMAL
MDC_IDC_LEAD_MFG: NORMAL
MDC_IDC_LEAD_MFG: NORMAL
MDC_IDC_LEAD_MODEL: NORMAL
MDC_IDC_LEAD_MODEL: NORMAL
MDC_IDC_LEAD_POLARITY_TYPE: NORMAL
MDC_IDC_LEAD_POLARITY_TYPE: NORMAL
MDC_IDC_LEAD_SERIAL: NORMAL
MDC_IDC_LEAD_SERIAL: NORMAL
MDC_IDC_MSMT_BATTERY_DTM: NORMAL
MDC_IDC_MSMT_BATTERY_REMAINING_LONGEVITY: 59 MO
MDC_IDC_MSMT_BATTERY_REMAINING_PERCENTAGE: 76 %
MDC_IDC_MSMT_BATTERY_RRT_TRIGGER: NORMAL
MDC_IDC_MSMT_BATTERY_STATUS: NORMAL
MDC_IDC_MSMT_BATTERY_VOLTAGE: 2.99 V
MDC_IDC_MSMT_LEADCHNL_RA_IMPEDANCE_VALUE: 400 OHM
MDC_IDC_MSMT_LEADCHNL_RA_LEAD_CHANNEL_STATUS: NORMAL
MDC_IDC_MSMT_LEADCHNL_RA_PACING_THRESHOLD_AMPLITUDE: 0.5 V
MDC_IDC_MSMT_LEADCHNL_RA_PACING_THRESHOLD_PULSEWIDTH: 0.4 MS
MDC_IDC_MSMT_LEADCHNL_RA_SENSING_INTR_AMPL: 3.1 MV
MDC_IDC_MSMT_LEADCHNL_RV_IMPEDANCE_VALUE: 490 OHM
MDC_IDC_MSMT_LEADCHNL_RV_LEAD_CHANNEL_STATUS: NORMAL
MDC_IDC_MSMT_LEADCHNL_RV_PACING_THRESHOLD_AMPLITUDE: 0.75 V
MDC_IDC_MSMT_LEADCHNL_RV_PACING_THRESHOLD_PULSEWIDTH: 0.4 MS
MDC_IDC_MSMT_LEADCHNL_RV_SENSING_INTR_AMPL: 8.3 MV
MDC_IDC_PG_IMPLANT_DTM: NORMAL
MDC_IDC_PG_MFG: NORMAL
MDC_IDC_PG_MODEL: NORMAL
MDC_IDC_PG_SERIAL: NORMAL
MDC_IDC_PG_TYPE: NORMAL
MDC_IDC_SESS_CLINIC_NAME: NORMAL
MDC_IDC_SESS_DTM: NORMAL
MDC_IDC_SESS_REPROGRAMMED: NO
MDC_IDC_SESS_TYPE: NORMAL
MDC_IDC_SET_BRADY_AT_MODE_SWITCH_MODE: NORMAL
MDC_IDC_SET_BRADY_AT_MODE_SWITCH_RATE: 180 {BEATS}/MIN
MDC_IDC_SET_BRADY_LOWRATE: 60 {BEATS}/MIN
MDC_IDC_SET_BRADY_MAX_SENSOR_RATE: 130 {BEATS}/MIN
MDC_IDC_SET_BRADY_MAX_TRACKING_RATE: 120 {BEATS}/MIN
MDC_IDC_SET_BRADY_MODE: NORMAL
MDC_IDC_SET_BRADY_PAV_DELAY_LOW: 200 MS
MDC_IDC_SET_BRADY_SAV_DELAY_LOW: 170 MS
MDC_IDC_SET_LEADCHNL_RA_PACING_AMPLITUDE: 3.5 V
MDC_IDC_SET_LEADCHNL_RA_PACING_ANODE_ELECTRODE_1: NORMAL
MDC_IDC_SET_LEADCHNL_RA_PACING_ANODE_LOCATION_1: NORMAL
MDC_IDC_SET_LEADCHNL_RA_PACING_CAPTURE_MODE: NORMAL
MDC_IDC_SET_LEADCHNL_RA_PACING_CATHODE_ELECTRODE_1: NORMAL
MDC_IDC_SET_LEADCHNL_RA_PACING_CATHODE_LOCATION_1: NORMAL
MDC_IDC_SET_LEADCHNL_RA_PACING_POLARITY: NORMAL
MDC_IDC_SET_LEADCHNL_RA_PACING_PULSEWIDTH: 0.4 MS
MDC_IDC_SET_LEADCHNL_RA_SENSING_ADAPTATION_MODE: NORMAL
MDC_IDC_SET_LEADCHNL_RA_SENSING_ANODE_ELECTRODE_1: NORMAL
MDC_IDC_SET_LEADCHNL_RA_SENSING_ANODE_LOCATION_1: NORMAL
MDC_IDC_SET_LEADCHNL_RA_SENSING_CATHODE_ELECTRODE_1: NORMAL
MDC_IDC_SET_LEADCHNL_RA_SENSING_CATHODE_LOCATION_1: NORMAL
MDC_IDC_SET_LEADCHNL_RA_SENSING_POLARITY: NORMAL
MDC_IDC_SET_LEADCHNL_RA_SENSING_SENSITIVITY: 0.5 MV
MDC_IDC_SET_LEADCHNL_RV_PACING_AMPLITUDE: 3.5 V
MDC_IDC_SET_LEADCHNL_RV_PACING_ANODE_ELECTRODE_1: NORMAL
MDC_IDC_SET_LEADCHNL_RV_PACING_ANODE_LOCATION_1: NORMAL
MDC_IDC_SET_LEADCHNL_RV_PACING_CAPTURE_MODE: NORMAL
MDC_IDC_SET_LEADCHNL_RV_PACING_CATHODE_ELECTRODE_1: NORMAL
MDC_IDC_SET_LEADCHNL_RV_PACING_CATHODE_LOCATION_1: NORMAL
MDC_IDC_SET_LEADCHNL_RV_PACING_POLARITY: NORMAL
MDC_IDC_SET_LEADCHNL_RV_PACING_PULSEWIDTH: 0.4 MS
MDC_IDC_SET_LEADCHNL_RV_SENSING_ADAPTATION_MODE: NORMAL
MDC_IDC_SET_LEADCHNL_RV_SENSING_ANODE_ELECTRODE_1: NORMAL
MDC_IDC_SET_LEADCHNL_RV_SENSING_ANODE_LOCATION_1: NORMAL
MDC_IDC_SET_LEADCHNL_RV_SENSING_CATHODE_ELECTRODE_1: NORMAL
MDC_IDC_SET_LEADCHNL_RV_SENSING_CATHODE_LOCATION_1: NORMAL
MDC_IDC_SET_LEADCHNL_RV_SENSING_POLARITY: NORMAL
MDC_IDC_SET_LEADCHNL_RV_SENSING_SENSITIVITY: 2 MV
MDC_IDC_STAT_AT_BURDEN_PERCENT: 1 %
MDC_IDC_STAT_AT_DTM_END: NORMAL
MDC_IDC_STAT_AT_DTM_START: NORMAL
MDC_IDC_STAT_AT_MODE_SW_COUNT: 5
MDC_IDC_STAT_AT_MODE_SW_COUNT_PER_DAY: 0
MDC_IDC_STAT_AT_MODE_SW_MAX_DURATION: NORMAL S
MDC_IDC_STAT_AT_MODE_SW_PERCENT_TIME: 1 %
MDC_IDC_STAT_BRADY_AP_VP_PERCENT: 9 %
MDC_IDC_STAT_BRADY_AP_VS_PERCENT: 59 %
MDC_IDC_STAT_BRADY_AS_VP_PERCENT: 1 %
MDC_IDC_STAT_BRADY_AS_VS_PERCENT: 31 %
MDC_IDC_STAT_BRADY_DTM_END: NORMAL
MDC_IDC_STAT_BRADY_DTM_START: NORMAL
MDC_IDC_STAT_BRADY_RA_PERCENT_PACED: 65 %
MDC_IDC_STAT_BRADY_RV_PERCENT_PACED: 9.6 %
MDC_IDC_STAT_CRT_DTM_END: NORMAL
MDC_IDC_STAT_CRT_DTM_START: NORMAL
MDC_IDC_STAT_HEART_RATE_ATRIAL_MAX: 330 {BEATS}/MIN
MDC_IDC_STAT_HEART_RATE_ATRIAL_MEAN: 69 {BEATS}/MIN
MDC_IDC_STAT_HEART_RATE_ATRIAL_MIN: 50 {BEATS}/MIN
MDC_IDC_STAT_HEART_RATE_DTM_END: NORMAL
MDC_IDC_STAT_HEART_RATE_DTM_START: NORMAL
MDC_IDC_STAT_HEART_RATE_VENTRICULAR_MAX: 210 {BEATS}/MIN
MDC_IDC_STAT_HEART_RATE_VENTRICULAR_MEAN: 67 {BEATS}/MIN
MDC_IDC_STAT_HEART_RATE_VENTRICULAR_MIN: 40 {BEATS}/MIN

## 2024-03-24 ENCOUNTER — HEALTH MAINTENANCE LETTER (OUTPATIENT)
Age: 84
End: 2024-03-24

## 2024-05-01 ENCOUNTER — TELEPHONE (OUTPATIENT)
Dept: CARDIOLOGY | Facility: CLINIC | Age: 84
End: 2024-05-01

## 2024-05-01 ENCOUNTER — DOCUMENTATION ONLY (OUTPATIENT)
Dept: CARDIOLOGY | Facility: CLINIC | Age: 84
End: 2024-05-01

## 2024-05-01 ENCOUNTER — ANCILLARY PROCEDURE (OUTPATIENT)
Dept: CARDIOLOGY | Facility: CLINIC | Age: 84
End: 2024-05-01
Attending: INTERNAL MEDICINE
Payer: COMMERCIAL

## 2024-05-01 VITALS
SYSTOLIC BLOOD PRESSURE: 139 MMHG | DIASTOLIC BLOOD PRESSURE: 79 MMHG | HEART RATE: 66 BPM | WEIGHT: 242.5 LBS | HEIGHT: 68 IN | BODY MASS INDEX: 36.75 KG/M2

## 2024-05-01 DIAGNOSIS — I10 ESSENTIAL HYPERTENSION, BENIGN: ICD-10-CM

## 2024-05-01 DIAGNOSIS — I48.0 PAF (PAROXYSMAL ATRIAL FIBRILLATION) (H): ICD-10-CM

## 2024-05-01 DIAGNOSIS — I25.118 CORONARY ARTERY DISEASE OF NATIVE ARTERY OF NATIVE HEART WITH STABLE ANGINA PECTORIS (H): ICD-10-CM

## 2024-05-01 DIAGNOSIS — E78.00 PURE HYPERCHOLESTEROLEMIA: Primary | Chronic | ICD-10-CM

## 2024-05-01 DIAGNOSIS — E66.812 CLASS 2 OBESITY DUE TO EXCESS CALORIES WITHOUT SERIOUS COMORBIDITY WITH BODY MASS INDEX (BMI) OF 35.0 TO 35.9 IN ADULT: ICD-10-CM

## 2024-05-01 DIAGNOSIS — Z95.0 CARDIAC PACEMAKER IN SITU: ICD-10-CM

## 2024-05-01 DIAGNOSIS — E66.09 CLASS 2 OBESITY DUE TO EXCESS CALORIES WITHOUT SERIOUS COMORBIDITY WITH BODY MASS INDEX (BMI) OF 35.0 TO 35.9 IN ADULT: ICD-10-CM

## 2024-05-01 DIAGNOSIS — I49.5 SICK SINUS SYNDROME (H): ICD-10-CM

## 2024-05-01 DIAGNOSIS — G47.33 OSA (OBSTRUCTIVE SLEEP APNEA): Chronic | ICD-10-CM

## 2024-05-01 DIAGNOSIS — I48.0 PAF (PAROXYSMAL ATRIAL FIBRILLATION) (H): Primary | ICD-10-CM

## 2024-05-01 DIAGNOSIS — R06.09 DOE (DYSPNEA ON EXERTION): ICD-10-CM

## 2024-05-01 DIAGNOSIS — N18.31 CHRONIC RENAL FAILURE, STAGE 3A (H): ICD-10-CM

## 2024-05-01 PROCEDURE — 99215 OFFICE O/P EST HI 40 MIN: CPT | Mod: 25 | Performed by: INTERNAL MEDICINE

## 2024-05-01 PROCEDURE — 93280 PM DEVICE PROGR EVAL DUAL: CPT | Performed by: INTERNAL MEDICINE

## 2024-05-01 RX ORDER — AMOXICILLIN 500 MG/1
2000 CAPSULE ORAL
Qty: 4 CAPSULE | Refills: 3 | Status: SHIPPED | OUTPATIENT
Start: 2024-05-01 | End: 2024-05-15

## 2024-05-01 RX ORDER — ATORVASTATIN CALCIUM 80 MG/1
80 TABLET, FILM COATED ORAL DAILY
Qty: 90 TABLET | Refills: 4 | Status: SHIPPED | OUTPATIENT
Start: 2024-05-01 | End: 2024-05-01

## 2024-05-01 RX ORDER — AMOXICILLIN 500 MG/1
2000 CAPSULE ORAL
Qty: 4 CAPSULE | Refills: 1 | Status: SHIPPED | OUTPATIENT
Start: 2024-05-01 | End: 2024-05-01

## 2024-05-01 RX ORDER — ATORVASTATIN CALCIUM 80 MG/1
80 TABLET, FILM COATED ORAL DAILY
Qty: 90 TABLET | Refills: 4 | Status: SHIPPED | OUTPATIENT
Start: 2024-05-01 | End: 2024-05-02

## 2024-05-01 RX ORDER — PRENATAL VIT 91/IRON/FOLIC/DHA 28-975-200
COMBINATION PACKAGE (EA) ORAL 2 TIMES DAILY
COMMUNITY

## 2024-05-01 RX ORDER — ATORVASTATIN CALCIUM 40 MG/1
40 TABLET, FILM COATED ORAL DAILY
Qty: 90 TABLET | Refills: 3 | Status: SHIPPED | OUTPATIENT
Start: 2024-05-01 | End: 2024-05-01

## 2024-05-01 RX ORDER — TOPIRAMATE 50 MG/1
50 TABLET, FILM COATED ORAL 2 TIMES DAILY
Status: ON HOLD | COMMUNITY
End: 2024-05-09

## 2024-05-01 RX ORDER — LOSARTAN POTASSIUM 50 MG/1
50 TABLET ORAL DAILY
Qty: 90 TABLET | Refills: 4 | Status: SHIPPED | OUTPATIENT
Start: 2024-05-01

## 2024-05-01 RX ORDER — ASPIRIN 81 MG/1
81 TABLET ORAL DAILY
Status: ON HOLD | COMMUNITY
Start: 2024-05-01 | End: 2024-05-10

## 2024-05-01 RX ORDER — LOSARTAN POTASSIUM 50 MG/1
50 TABLET ORAL DAILY
Qty: 90 TABLET | Refills: 3 | Status: SHIPPED | OUTPATIENT
Start: 2024-05-01 | End: 2024-05-01

## 2024-05-01 NOTE — PROGRESS NOTES
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    Sick sinus syndrome and a pacemaker placed in [place] 2022.  Has identified paroxysmal atrial fibrillation [facility] to edoxaban 60 mg daily.  He also has obstructive sleep apnea for which he does not use a CPAP mask.     He had COVID in 03/2020.  Upon return this spring, Tony thought he had worsening dyspnea on exertion.  He had no chest, arm, neck, jaw or shoulder discomfort.  His activity is mostly limited by debilitating back pain.  A [facility] upon return to [place] demonstrated a small reversible defect in the basilar to mid inferior wall.  Ejection fraction was 63% without focal wall motion abnormalities.  BNP was 174.       His last echocardiogram performed in 09/2021 demonstrated ejection fraction of 60%-65% without focal wall motion abnormalities, no significant valvular pathology and normal pulmonary pressures.     Because of his abnormal stress test, we took him to the Cath Lab, which demonstrated what appeared to be a chronically occluded right coronary artery filled by left to right collaterals.  He did have a distal left main stenosis in the 25% range.  The proximal LAD appeared to have a stenosis in the 50% -60% range.  The iFR of the left anterior descending artery returned at 0.85.  In an attempt to perform angioplasty of the proximal ostial LAD, I was unable to advance a balloon and I decided we would set up as a CHIP case as it is undoubtably because a calcium would require more aggressive intervention.     We added Imdur 15 mg to his regimen and Plavix  and started him in Cardiac Rehab.  When he returned for followup, he thought he was feeling significantly better and did not want to follow up with the [facility] team.     When I last saw Tony in August 2022 he was doing well.  He now returns and states he is severe dyspnea on exertion.  He has no orthopnea or PND.  No peripheral edema.  He also states all of his joints knees, shoulder and back also limits his activity.  He states nobody wants to touch him to help him improve.      As stated, Tony is having no chest, arm, neck, jaw or shoulder discomfort.  He is having no bleeding problems with his edoxaban    Interrogation of device shows the a paces 76 per time of the time he paces in the ventricle 42% of the time.  He had no atrial or ventricular arrhythmias.  As stated display and his heart rate appears to be predominantly in the 60s and 70s rate response has been adjusted.     ASSESSMENT AND PLAN:   Tony has no clear anginal symptoms but I suspect his dyspnea on exertion may be an anginal equivalent.  At this time we again discussed going back to the Cath Lab for reevaluation of his coronary anatomy and possible intervention on his LAD stenosis.  I discussed risk, benefits and alternatives with the patient.  He appears understand desires to proceed.  He wants a radial approach if possible because severe back problems.  He has had problems lying on his back in recovery previously    We will have to hold his edoxaban for 48 hours prior to his cath.  We talked about the fact that he ought to be on edoxaban and Plavix post intervention if he gets a stent.  I will start aspirin 81 mg daily which will likely be changed out to Plavix post intervention.    As stated above we made adjustments in his device.     Tony's heart failure appears to be well compensated.     [facility] a fasting lipid profile from earlier this month including cholesterol 132, HDL 46, LDL 60 and triglycerides of 131.  Hemoglobin A1c is  5.6 creatinine and electrolytes are normal.  I will increase his atorvastatin to 80 mg daily to try to drive LDL below 55.     Blood pressure is well controlled today at 139/79 with a pulse of 66.     Weight is 242 pounds,.  Body mass index is 36.9.  Hopefully if we fix his coronary anatomy his dyspnea on exertion will improve and he will be able to exercise more and/or get his orthopedic issues addressed      I will follow him up after his coronary angiogram.     Nabeel Allred MD, [facility]       Today's clinic visit entailed:  Review of the result(s) of each unique test - coronary angiogram, lab, device interrogation  Ordering of each unique test  Prescription drug management  55 minutes spent    Provider  Link to [facility] Help Grid     The level of medical decision making during this visit was of high complexity.      Orders Placed This Encounter   Procedures    Follow-Up with Cardiology    Case Request Cath Lab: Coronary Angiogram, Percutaneous Coronary Intervention Stent, Left Heart Catheterization       Orders Placed This Encounter   Medications    topiramate (TOPAMAX) 50 MG tablet     Sig: Take 50 mg by mouth 2 times daily    terbinafine (LAMISIL) 1 % external cream     Sig: Apply topically 2 times daily    edoxaban ANTICOAGULANT (SAVAYSA) 60 MG TABS tablet     Sig: Take by mouth daily    amoxicillin (AMOXIL) 500 MG capsule     Sig: Take 4 capsules (2,000 mg) by mouth once as needed (30 minutes before dental procedure)     Dispense:  4 capsule     Refill:  1    atorvastatin (LIPITOR) 40 MG tablet     Sig: Take 1 tablet (40 mg) by mouth daily     Dispense:  90 tablet     Refill:  3    losartan (COZAAR) 50 MG tablet     Sig: Take 1 tablet (50 mg) by mouth daily     Dispense:  90 tablet     Refill:  3       Medications Discontinued During This Encounter   Medication Reason    propranolol (INDERAL) 10 MG tablet Stopped by Patient (No AVS)    apixaban ANTICOAGULANT (ELIQUIS ANTICOAGULANT) 5 MG tablet Stopped by  Patient (No AVS)    traMADol (ULTRAM) 50 MG tablet     Urea 40 % CREA     valACYclovir (VALTREX) 1000 mg tablet     amoxicillin (AMOXIL) 500 MG capsule Reorder (No AVS)    losartan (COZAAR) 50 MG tablet Reorder (No AVS)    atorvastatin (LIPITOR) 40 MG tablet Reorder (No AVS)         Encounter Diagnoses   Name Primary?    Coronary artery disease of native artery of native heart with stable angina pectoris (H24)     PAF (paroxysmal atrial fibrillation) (H)     Essential hypertension, benign     Cardiac pacemaker in situ     Sick sinus syndrome (H)     ÁLVAREZ (dyspnea on exertion)     Class 2 obesity due to excess calories without serious comorbidity with body mass index (BMI) of 35.0 to 35.9 in adult     Chronic renal failure, stage 3a (H)     LIZANDRO (obstructive sleep apnea)     Pure hypercholesterolemia Yes       CURRENT MEDICATIONS:  Current Outpatient Medications   Medication Sig Dispense Refill    acetaminophen 500 MG CAPS 2 tablets twice a day      amoxicillin (AMOXIL) 500 MG capsule Take 4 capsules (2,000 mg) by mouth once as needed (30 minutes before dental procedure) 4 capsule 1    atorvastatin (LIPITOR) 40 MG tablet Take 1 tablet (40 mg) by mouth daily 90 tablet 3    Calcium Carb-Cholecalciferol (CALCIUM-VITAMIN D3) 250-125 MG-UNIT TABS Take 2 tablets by mouth 2 times daily      celecoxib (CELEBREX) 100 MG capsule Take 100 mg by mouth 2 times daily      cyanocobalamin (VITAMIN B-12) 1000 MCG tablet Take by mouth daily      diclofenac (VOLTAREN) 1 % GEL topical gel Place onto the skin as needed for moderate pain      diltiazem ER (DILT-XR) 180 MG 24 hr capsule Take 180 mg by mouth daily      docusate sodium (COLACE) 100 MG capsule Take 100 mg by mouth 2 times daily as needed for constipation      edoxaban ANTICOAGULANT (SAVAYSA) 60 MG TABS tablet Take by mouth daily      Emollient (VANICREAM EX) APPLY THIN LAYER    TWICE A DAY FOR DRY SKIN      finasteride (PROSCAR) 5 MG tablet Take 5 mg by mouth daily      folic  "acid (FOLVITE) 1 MG tablet Take 1 mg by mouth daily      furosemide (LASIX) 20 MG tablet Take 1 tablet (20 mg) by mouth daily 30 tablet 3    hydrocortisone 2.5 % cream Apply topically 2 times daily      lidocaine (LIDODERM) 5 % Patch Place 1 patch onto the skin      linaclotide (LINZESS) 145 MCG capsule Take by mouth every morning (before breakfast)      losartan (COZAAR) 50 MG tablet Take 1 tablet (50 mg) by mouth daily 90 tablet 3    nitroGLYcerin (NITROSTAT) 0.4 MG sublingual tablet One tablet under the tongue every 5 minutes if needed for chest pain. May repeat every 5 minutes for a maximum of 3 doses in 15 minutes\" 25 tablet 3    omega 3 1000 MG CAPS Take by mouth daily      omeprazole (PRILOSEC) 20 MG DR capsule Take 20 mg by mouth daily      polyethylene glycol (MIRALAX/GLYCOLAX) Packet Take 1 packet by mouth as needed for constipation      primidone (MYSOLINE) 250 MG tablet Take 300 mg by mouth 2 times daily      propranolol ER (INDERAL LA) 120 MG 24 hr capsule Take 120 mg by mouth daily      psyllium 0.52 g capsule Take 1 capsule by mouth daily      sodium fluoride dental gel (PREVIDENT) 1.1 % GEL topical gel Apply to affected area At Bedtime      spironolactone (ALDACTONE) 25 MG tablet Take 1 tablet (25 mg) by mouth daily 90 tablet 3    tamsulosin (FLOMAX) 0.4 MG capsule Take 0.4 mg by mouth daily      terbinafine (LAMISIL) 1 % external cream Apply topically 2 times daily      topiramate (TOPAMAX) 50 MG tablet Take 50 mg by mouth 2 times daily      Vitamin D, Cholecalciferol, 1000 units CAPS Take by mouth daily         ALLERGIES     Allergies   Allergen Reactions    Lisinopril     Morphine      confusion       PAST MEDICAL HISTORY:  Past Medical History:   Diagnosis Date    Coronary atherosclerosis of unspecified type of vessel, native or graft 08/2001    cardiac cath 5/2022: unsuccessful attempt to LAD-medical management; cath 2005: GER to LAD; cath 2002: GER to RCA    Essential hypertension, benign     " INTERMITTENT HYPERTENSION    Essential tremor     Generalized osteoarthrosis, unspecified site     Hyperlipidaemia     Hypertrophy of prostate without urinary obstruction and other lower urinary tract symptoms (LUTS)     BPH - Dr Pinto    Impotence of organic origin     Dr Pinto - Dr Allred    NONSPECIFIC MEDICAL HISTORY     CERVICAL/LUMBAR RADICULOPATHY    NONSPECIFIC MEDICAL HISTORY     SHOULDER IMPINGEMENT    NSTEMI (non-ST elevated myocardial infarction) (H)     inferior 2001    Obese     Other and unspecified hyperlipidemia     ?    Peripheral neuropathy     Sick sinus syndrome (H)     s/p [employer] pacemaker implanted March 2022 in [place]       PAST SURGICAL HISTORY:  Past Surgical History:   Procedure Laterality Date    CARDIAC NUC DANISHA STRESS TEST NL  4/09    normal    COLONOSCOPY  6/06    normal - diverticulosis - dr adams    COLONOSCOPY  8/14/2012    Procedure: COLONOSCOPY;  COLONOSCOPY SCREENING/ 6 YEAR FOLLOW UP;  Surgeon: Rey Adams MD;  Location:  GI    CV CORONARY ANGIOGRAM N/A 5/25/2022    Procedure: Coronary Angiogram;  Surgeon: Nabeel Allred MD;  Location:  HEART CARDIAC CATH LAB    CV INSTANTANEOUS WAVE-FREE RATIO N/A 5/25/2022    Procedure: Instantaneous Wave-Free Ratio;  Surgeon: Nabeel Allred MD;  Location:  HEART CARDIAC CATH LAB    CV LEFT HEART CATH N/A 5/25/2022    Procedure: Left Heart Catheterization;  Surgeon: Nabeel Allred MD;  Location:  HEART CARDIAC CATH LAB    CV PCI N/A 5/25/2022    Procedure: Percutaneous Coronary Intervention;  Surgeon: Nabeel Allred MD;  Location:  HEART CARDIAC CATH LAB    SURGICAL HISTORY OF -   1990    S/P CERVICAL DISCECTOMY x 2    SURGICAL HISTORY OF -   1990    S/P LUMBAR DISCECTOMY x 2    SURGICAL HISTORY OF -   1993    S/P LT CARPAL TUNNEL SURG/SHOULDER IMPINGEMENT    SURGICAL HISTORY OF -   8/01    S/P STENT OF RT CORONARY ARTERY    SURGICAL HISTORY OF -   9/05    Drug eluting stent to LAD    ZZC TOTAL KNEE  "ARTHROPLASTY  7/04    Knee Replacement, Total - LEFT Dr Regan    ZZHC REPAIR UMBILICAL CHIKA,5+Y/O,REDUC  9/04    dr dominique       FAMILY HISTORY:  Family History   Problem Relation Age of Onset    Respiratory Father         COPD    Cerebrovascular Disease Mother         CVA    C.A.D. Brother         CABG - after CABG x 3 - rheumatic fever as a child    Breast Cancer Sister     Cerebrovascular Disease Sister        SOCIAL HISTORY:  Social History     Socioeconomic History    Marital status:      Spouse name: paola    Number of children: 4    Years of education: 14    Highest education level: None   Tobacco Use    Smoking status: Never    Smokeless tobacco: Never   Substance and Sexual Activity    Alcohol use: No    Drug use: No    Sexual activity: Yes     Partners: Female   Other Topics Concern    Caffeine Concern Yes     Comment: 3-4 cups qd    Exercise Yes     Comment: limited    Seat Belt Yes       Review of Systems:  Skin:  Negative       Eyes:  Positive for double vision    ENT:  Negative      Respiratory:  Positive for dyspnea on exertion     Cardiovascular:  Negative;palpitations;chest pain;syncope or near-syncope;cyanosis;fatigue;edema fatigue;Positive for    Gastroenterology: Negative      Genitourinary:  Positive for prostate problem    Musculoskeletal:  Positive for joint pain;back pain    Neurologic:  Negative      Psychiatric:  Negative      Heme/Lymph/Imm:  Negative      Endocrine:  Negative        Physical Exam:  Vitals: /79   Pulse 66   Ht 1.727 m (5' 8\")   Wt 110 kg (242 lb 8 oz)   BMI 36.87 kg/m      Constitutional:  cooperative, alert and oriented, well developed, well nourished, in no acute distress obese      Skin:  warm and dry to the touch, no apparent skin lesions or masses noted          Head:  normocephalic, no masses or lesions        Eyes:  pupils equal and round, conjunctivae and lids unremarkable, sclera white, no xanthalasma, EOMS intact, no nystagmus        Lymph: "      ENT:  no pallor or cyanosis, dentition good        Neck:  carotid pulses are full and equal bilaterally        Respiratory:  normal breath sounds, clear to auscultation, normal A-P diameter, normal symmetry, normal respiratory excursion, no use of accessory muscles         Cardiac: regular rhythm;normal S1 and S2;no S3 or S4       systolic murmur;RUSB;grade 1        pulses full and equal                                        GI:    obese      Extremities and Muscular Skeletal:  no edema;no spinal abnormalities noted;normal muscle strength and tone              Neurological:  no gross motor deficits   Resting tremor    Psych:  affect appropriate, oriented to time, person and place        CC  Nabeel Allred MD  [id number]  [place] [id number]

## 2024-05-01 NOTE — LETTER
5/1/2024    [facility]  [facility] [address]    RE: Tony Bruce       Dear Colleague,     I had the pleasure of seeing Tony Bruce in the [facility].  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    Sick sinus syndrome and a pacemaker placed in [place] 2022.  Has identified paroxysmal atrial fibrillation [facility] to edoxaban 60 mg daily.  He also has obstructive sleep apnea for which he does not use a CPAP mask.     He had COVID in 03/2020.  Upon return this spring, Tony thought he had worsening dyspnea on exertion.  He had no chest, arm, neck, jaw or shoulder discomfort.  His activity is mostly limited by debilitating back pain.  A stress nuclear [facility] upon return to [place] demonstrated a small reversible defect in the basilar to mid inferior wall.  Ejection fraction was 63% without focal wall motion abnormalities.  BNP was 174.       His last echocardiogram performed in 09/2021 demonstrated ejection fraction of 60%-65% without focal wall motion abnormalities, no significant valvular pathology and normal pulmonary pressures.     Because of his abnormal stress test, we took him to the Cath Lab, which demonstrated what appeared to be a chronically occluded right coronary artery filled by left to right collaterals.  He did have a distal left main stenosis in the 25% range.  The proximal LAD appeared to have a stenosis in the 50% -60% range.  The iFR of the left anterior descending artery returned at 0.85.  In an attempt to perform angioplasty of the proximal ostial LAD,  I was unable to advance a balloon and I decided we would set up as a CHIP case as it is undoubtably because a calcium would require more aggressive intervention.     We added Imdur 15 mg to his regimen and Plavix and started him in Cardiac Rehab.  When he returned for followup, he thought he was feeling significantly better and did not want to follow up with the CHIP team.     When I last saw Tony in August 2022 he was doing well.  He now returns and states he is severe dyspnea on exertion.  He has no orthopnea or PND.  No peripheral edema.  He also states all of his joints knees, shoulder and back also limits his activity.  He states nobody wants to touch him to help him improve.      As stated, Tony is having no chest, arm, neck, jaw or shoulder discomfort.  He is having no bleeding problems with his edoxaban    Interrogation of device shows the a paces 76 per time of the time he paces in the ventricle 42% of the time.  He had no atrial or ventricular arrhythmias.  As stated display and his heart rate appears to be predominantly in the 60s and 70s rate response has been adjusted.     ASSESSMENT AND PLAN:   Tony has no clear anginal symptoms but I suspect his dyspnea on exertion may be an anginal equivalent.  At this time we again discussed going back to the Cath Lab for reevaluation of his coronary anatomy and possible intervention on his LAD stenosis.  I discussed risk, benefits and alternatives with the patient.  He appears understand desires to proceed.  He wants a radial approach if possible because severe back problems.  He has had problems lying on his back in recovery previously    We will have to hold his edoxaban for 48 hours prior to his cath.  We talked about the fact that he ought to be on edoxaban and Plavix post intervention if he gets a stent.  I will start aspirin 81 mg daily which will likely be changed out to Plavix post intervention.    As stated above we made adjustments in his device.      Tony's heart failure appears to be well compensated.     [facility] shows a fasting lipid profile from earlier this month including cholesterol 132, HDL 46, LDL 60 and triglycerides of 131.  Hemoglobin A1c is 5.6 creatinine and electrolytes are normal.  I will increase his atorvastatin to 80 mg daily to try to drive LDL below 55.     Blood pressure is well controlled today at 139/79 with a pulse of 66.     Weight is 242 pounds,.  Body mass index is 36.9.  Hopefully if we fix his coronary anatomy his dyspnea on exertion will improve and he will be able to exercise more and/or get his orthopedic issues addressed      I will follow him up after his coronary angiogram.     Nabeel Allred MD, [facility]       Today's clinic visit entailed:  Review of the result(s) of each unique test - coronary angiogram, lab, device interrogation  Ordering of each unique test  Prescription drug management  55 minutes spent    Provider  Link to [facility] Help Grid     The level of medical decision making during this visit was of high complexity.      Orders Placed This Encounter   Procedures    Follow-Up with Cardiology    Case Request Cath Lab: Coronary Angiogram, Percutaneous Coronary Intervention Stent, Left Heart Catheterization       Orders Placed This Encounter   Medications    topiramate (TOPAMAX) 50 MG tablet     Sig: Take 50 mg by mouth 2 times daily    terbinafine (LAMISIL) 1 % external cream     Sig: Apply topically 2 times daily    edoxaban ANTICOAGULANT (SAVAYSA) 60 MG TABS tablet     Sig: Take by mouth daily    amoxicillin (AMOXIL) 500 MG capsule     Sig: Take 4 capsules (2,000 mg) by mouth once as needed (30 minutes before dental procedure)     Dispense:  4 capsule     Refill:  1    atorvastatin (LIPITOR) 40 MG tablet     Sig: Take 1 tablet (40 mg) by mouth daily     Dispense:  90 tablet     Refill:  3    losartan (COZAAR) 50 MG tablet     Sig: Take 1 tablet (50 mg) by mouth daily     Dispense:  90 tablet     Refill:  3        Medications Discontinued During This Encounter   Medication Reason    propranolol (INDERAL) 10 MG tablet Stopped by Patient (No AVS)    apixaban ANTICOAGULANT (ELIQUIS ANTICOAGULANT) 5 MG tablet Stopped by Patient (No AVS)    traMADol (ULTRAM) 50 MG tablet     Urea 40 % CREA     valACYclovir (VALTREX) 1000 mg tablet     amoxicillin (AMOXIL) 500 MG capsule Reorder (No AVS)    losartan (COZAAR) 50 MG tablet Reorder (No AVS)    atorvastatin (LIPITOR) 40 MG tablet Reorder (No AVS)         Encounter Diagnoses   Name Primary?    Coronary artery disease of native artery of native heart with stable angina pectoris (H24)     PAF (paroxysmal atrial fibrillation) (H)     Essential hypertension, benign     Cardiac pacemaker in situ     Sick sinus syndrome (H)     ÁLVAREZ (dyspnea on exertion)     Class 2 obesity due to excess calories without serious comorbidity with body mass index (BMI) of 35.0 to 35.9 in adult     Chronic renal failure, stage 3a (H)     LIZANDRO (obstructive sleep apnea)     Pure hypercholesterolemia Yes       CURRENT MEDICATIONS:  Current Outpatient Medications   Medication Sig Dispense Refill    acetaminophen 500 MG CAPS 2 tablets twice a day      amoxicillin (AMOXIL) 500 MG capsule Take 4 capsules (2,000 mg) by mouth once as needed (30 minutes before dental procedure) 4 capsule 1    atorvastatin (LIPITOR) 40 MG tablet Take 1 tablet (40 mg) by mouth daily 90 tablet 3    Calcium Carb-Cholecalciferol (CALCIUM-VITAMIN D3) 250-125 MG-UNIT TABS Take 2 tablets by mouth 2 times daily      celecoxib (CELEBREX) 100 MG capsule Take 100 mg by mouth 2 times daily      cyanocobalamin (VITAMIN B-12) 1000 MCG tablet Take by mouth daily      diclofenac (VOLTAREN) 1 % GEL topical gel Place onto the skin as needed for moderate pain      diltiazem ER (DILT-XR) 180 MG 24 hr capsule Take 180 mg by mouth daily      docusate sodium (COLACE) 100 MG capsule Take 100 mg by mouth 2 times daily as needed for constipation      edoxaban  "ANTICOAGULANT (SAVAYSA) 60 MG TABS tablet Take by mouth daily      Emollient (VANICREAM EX) APPLY THIN LAYER    TWICE A DAY FOR DRY SKIN      finasteride (PROSCAR) 5 MG tablet Take 5 mg by mouth daily      folic acid (FOLVITE) 1 MG tablet Take 1 mg by mouth daily      furosemide (LASIX) 20 MG tablet Take 1 tablet (20 mg) by mouth daily 30 tablet 3    hydrocortisone 2.5 % cream Apply topically 2 times daily      lidocaine (LIDODERM) 5 % Patch Place 1 patch onto the skin      linaclotide (LINZESS) 145 MCG capsule Take by mouth every morning (before breakfast)      losartan (COZAAR) 50 MG tablet Take 1 tablet (50 mg) by mouth daily 90 tablet 3    nitroGLYcerin (NITROSTAT) 0.4 MG sublingual tablet One tablet under the tongue every 5 minutes if needed for chest pain. May repeat every 5 minutes for a maximum of 3 doses in 15 minutes\" 25 tablet 3    omega 3 1000 MG CAPS Take by mouth daily      omeprazole (PRILOSEC) 20 MG DR capsule Take 20 mg by mouth daily      polyethylene glycol (MIRALAX/GLYCOLAX) Packet Take 1 packet by mouth as needed for constipation      primidone (MYSOLINE) 250 MG tablet Take 300 mg by mouth 2 times daily      propranolol ER (INDERAL LA) 120 MG 24 hr capsule Take 120 mg by mouth daily      psyllium 0.52 g capsule Take 1 capsule by mouth daily      sodium fluoride dental gel (PREVIDENT) 1.1 % GEL topical gel Apply to affected area At Bedtime      spironolactone (ALDACTONE) 25 MG tablet Take 1 tablet (25 mg) by mouth daily 90 tablet 3    tamsulosin (FLOMAX) 0.4 MG capsule Take 0.4 mg by mouth daily      terbinafine (LAMISIL) 1 % external cream Apply topically 2 times daily      topiramate (TOPAMAX) 50 MG tablet Take 50 mg by mouth 2 times daily      Vitamin D, Cholecalciferol, 1000 units CAPS Take by mouth daily         ALLERGIES     Allergies   Allergen Reactions    Lisinopril     Morphine      confusion       PAST MEDICAL HISTORY:  Past Medical History:   Diagnosis Date    Coronary atherosclerosis " of unspecified type of vessel, native or graft 08/2001    cardiac cath 5/2022: unsuccessful attempt to LAD-medical management; cath 2005: GER to LAD; cath 2002: GER to RCA    Essential hypertension, benign     INTERMITTENT HYPERTENSION    Essential tremor     Generalized osteoarthrosis, unspecified site     Hyperlipidaemia     Hypertrophy of prostate without urinary obstruction and other lower urinary tract symptoms (LUTS)     BPH - Dr Pinto    Impotence of organic origin     Dr Pinto - Dr Allred    NONSPECIFIC MEDICAL HISTORY     CERVICAL/LUMBAR RADICULOPATHY    NONSPECIFIC MEDICAL HISTORY     SHOULDER IMPINGEMENT    NSTEMI (non-ST elevated myocardial infarction) (H)     inferior 2001    Obese     Other and unspecified hyperlipidemia     ?    Peripheral neuropathy     Sick sinus syndrome (H)     s/p [employer] pacemaker implanted March 2022 in [place]       PAST SURGICAL HISTORY:  Past Surgical History:   Procedure Laterality Date    CARDIAC NUC DANISHA STRESS TEST NL  4/09    normal    COLONOSCOPY  6/06    normal - diverticulosis - dr adams    COLONOSCOPY  8/14/2012    Procedure: COLONOSCOPY;  COLONOSCOPY SCREENING/ 6 YEAR FOLLOW UP;  Surgeon: Rey Adams MD;  Location:  GI    CV CORONARY ANGIOGRAM N/A 5/25/2022    Procedure: Coronary Angiogram;  Surgeon: Nabeel Allred MD;  Location:  HEART CARDIAC CATH LAB    CV INSTANTANEOUS WAVE-FREE RATIO N/A 5/25/2022    Procedure: Instantaneous Wave-Free Ratio;  Surgeon: Nabeel Allred MD;  Location:  HEART CARDIAC CATH LAB    CV LEFT HEART CATH N/A 5/25/2022    Procedure: Left Heart Catheterization;  Surgeon: Nabeel Allred MD;  Location:  HEART CARDIAC CATH LAB    CV PCI N/A 5/25/2022    Procedure: Percutaneous Coronary Intervention;  Surgeon: Nabeel Allred MD;  Location:  HEART CARDIAC CATH LAB    SURGICAL HISTORY OF -   1990    S/P CERVICAL DISCECTOMY x 2    SURGICAL HISTORY OF -   1990    S/P LUMBAR DISCECTOMY x 2    SURGICAL HISTORY  "OF - 1993    S/P LT CARPAL TUNNEL SURG/SHOULDER IMPINGEMENT    SURGICAL HISTORY OF -   8/01    S/P STENT OF RT CORONARY ARTERY    SURGICAL HISTORY OF -   9/05    Drug eluting stent to LAD    [facility] TOTAL KNEE ARTHROPLASTY  7/04    Knee Replacement, Total - LEFT Dr Regan    ZZ REPAIR UMBILICAL CHIKA,5+Y/O,REDUC  9/04    dr dominique       FAMILY HISTORY:  Family History   Problem Relation Age of Onset    Respiratory Father         COPD    Cerebrovascular Disease Mother         CVA    C.A.D. Brother         CABG - after CABG x 3 - rheumatic fever as a child    Breast Cancer Sister     Cerebrovascular Disease Sister        SOCIAL HISTORY:  Social History     Socioeconomic History    Marital status:      Spouse name: paola    Number of children: 4    Years of education: 14    Highest education level: None   Tobacco Use    Smoking status: Never    Smokeless tobacco: Never   Substance and Sexual Activity    Alcohol use: No    Drug use: No    Sexual activity: Yes     Partners: Female   Other Topics Concern    Caffeine Concern Yes     Comment: 3-4 cups qd    Exercise Yes     Comment: limited    Seat Belt Yes       Review of Systems:  Skin:  Negative       Eyes:  Positive for double vision    ENT:  Negative      Respiratory:  Positive for dyspnea on exertion     Cardiovascular:  Negative;palpitations;chest pain;syncope or near-syncope;cyanosis;fatigue;edema fatigue;Positive for    Gastroenterology: Negative      Genitourinary:  Positive for prostate problem    Musculoskeletal:  Positive for joint pain;back pain    Neurologic:  Negative      Psychiatric:  Negative      Heme/Lymph/Imm:  Negative      Endocrine:  Negative        Physical Exam:  Vitals: /79   Pulse 66   Ht 1.727 m (5' 8\")   Wt 110 kg (242 lb 8 oz)   BMI 36.87 kg/m      Constitutional:  cooperative, alert and oriented, well developed, well nourished, in no acute distress obese      Skin:  warm and dry to the touch, no apparent skin lesions or " masses noted          Head:  normocephalic, no masses or lesions        Eyes:  pupils equal and round, conjunctivae and lids unremarkable, sclera white, no xanthalasma, EOMS intact, no nystagmus        Lymph:      ENT:  no pallor or cyanosis, dentition good        Neck:  carotid pulses are full and equal bilaterally        Respiratory:  normal breath sounds, clear to auscultation, normal A-P diameter, normal symmetry, normal respiratory excursion, no use of accessory muscles         Cardiac: regular rhythm;normal S1 and S2;no S3 or S4       systolic murmur;RUSB;grade 1        pulses full and equal                                        GI:    obese      Extremities and Muscular Skeletal:  no edema;no spinal abnormalities noted;normal muscle strength and tone              Neurological:  no gross motor deficits   Resting tremor    Psych:  affect appropriate, oriented to time, person and place        CC  Nabeel Allred MD  [address]  [place] [address]      Thank you for allowing me to participate in the care of your patient.      Sincerely,     Nabeel Allred MD     [facility]

## 2024-05-01 NOTE — TELEPHONE ENCOUNTER
[facility]  [facility] for medications.     Message left to return call.     2 messages sent by Dr. Allred.   ----- Message from Nabeel Allred MD sent at 5/1/2024  2:32 PM CDT -----  Have him start aspirin 81 mg daily in anticipation of his stent.  It will be discontinued and changed to Plavix if he gets a stent.  He will need to hold his edoxaban 48 ( Savaysa) hours before his cath.    -----Nabeel Allred MD  [facility] Team 2  Please tell patient I want to increase his atorvastatin to 80 mg in an effort to get LDL less than 60.  [facility].      Heart cath order was placed, Not  scheduled, Expected date 5-9-24.     Last Dr. Allred's OV 5-1-24   ASSESSMENT AND PLAN:   Tony has no clear anginal symptoms but I suspect his dyspnea on exertion may be an anginal equivalent.  At this time we again discussed going back to the Cath Lab for reevaluation of his coronary anatomy and possible intervention on his LAD stenosis.  I discussed risk, benefits and alternatives with the patient.  He appears understand desires to proceed.  He wants a radial approach if possible because severe back problems.  He has had problems lying on his back in recovery previously     We will have to hold his edoxaban for 48 hours prior to his cath.  We talked about the fact that he ought to be on edoxaban and Plavix post intervention if he gets a stent.  I will start aspirin 81 mg daily which will likely be changed out to Plavix post intervention.

## 2024-05-01 NOTE — PROGRESS NOTES
Labs requested from [facility] done 4/25/24. JENAE signed by patient. Visit is this afternoon with Dr Allred.       Addendum: [facility] of [facility], given to Dr Allred and also sent to [facility] for entry.

## 2024-05-02 ENCOUNTER — TELEPHONE (OUTPATIENT)
Dept: CARDIOLOGY | Facility: CLINIC | Age: 84
End: 2024-05-02
Payer: OTHER GOVERNMENT

## 2024-05-02 DIAGNOSIS — I25.118 CORONARY ARTERY DISEASE OF NATIVE ARTERY OF NATIVE HEART WITH STABLE ANGINA PECTORIS (H): Primary | ICD-10-CM

## 2024-05-02 DIAGNOSIS — I48.0 PAF (PAROXYSMAL ATRIAL FIBRILLATION) (H): ICD-10-CM

## 2024-05-02 RX ORDER — ATORVASTATIN CALCIUM 80 MG/1
80 TABLET, FILM COATED ORAL DAILY
Qty: 90 TABLET | Refills: 4 | Status: SHIPPED | OUTPATIENT
Start: 2024-05-02

## 2024-05-02 NOTE — TELEPHONE ENCOUNTER
Spoke to patient and wife and reviewed recommendations-    Start ASA 81mg daily  Increase atorvastatin to 80mg daily  Stop edoxaban 2 days before his procedure    They expressed understanding. Message from Team 4 nursing, Dr Blood also wants to get an echo before his angiogram. Reviewed this with the patient/his wife and will message scheduling to arrange. [facility] were already [facility] to [facility] yesterday for medications.

## 2024-05-02 NOTE — TELEPHONE ENCOUNTER
Staff message received:   Con Blood MD Battista, Stephen C, MD; [facility] Heart Team 4  Thanks for sending this over, Lamberto.  I looked over the films - should be fine to do radial, but I'm going to see if we can get a repeat echo on him beforehand just to make sure his EF is still normal (and I might check an LVEDP on him first too before we work on the PCI).  If his EF is down / LVEDP is up, I'd be a little nervous about prox LAD rota when his RCA is 'd, and would have to at least consider Impella (which we can hopefully avoid given his back issues not to mention age, etc...).  Hopefully that's not necessary, but otherwise should be good to go.    Thanks,  Chad    Team 4:  Would you mind ordering the TTE and seeing if we can get this set up prior to his scheduled angio?  Also, could you send me a reminder early next week to write up a PCI plan to send to the cath lab staff?    Thanks!          Previous Messages       ----- Message -----  From: Nabeel Allred MD  Sent: 5/1/2024   2:31 PM CDT  To: MD Chad Najera I am sending this yoan in your direction.  He is a chip case that avoided follow-up for a couple years but now dyspnea on exertion has changed his mind.  See my note and 2022 angiogram.  I think he is scheduled for you on May 9.  He would like to be done from the arm as he has severe back problems.  He is [facility] for paroxysmal A-fib.  I am having that held for 48 hours.  Increase that if you want.  I will start him on aspirin 81 mg daily in anticipation.      Echo ordered and reminder will be sent to Dr. Blood to write up PCI plan .

## 2024-05-03 ENCOUNTER — TELEPHONE (OUTPATIENT)
Dept: CARDIOLOGY | Facility: CLINIC | Age: 84
End: 2024-05-03

## 2024-05-03 ENCOUNTER — HOSPITAL ENCOUNTER (OUTPATIENT)
Dept: CARDIOLOGY | Facility: CLINIC | Age: 84
Discharge: HOME | End: 2024-05-03
Attending: INTERNAL MEDICINE | Admitting: INTERNAL MEDICINE
Payer: COMMERCIAL

## 2024-05-03 DIAGNOSIS — I48.0 PAF (PAROXYSMAL ATRIAL FIBRILLATION) (H): ICD-10-CM

## 2024-05-03 DIAGNOSIS — I25.118 CORONARY ARTERY DISEASE OF NATIVE ARTERY OF NATIVE HEART WITH STABLE ANGINA PECTORIS (H): ICD-10-CM

## 2024-05-03 DIAGNOSIS — R06.09 DOE (DYSPNEA ON EXERTION): ICD-10-CM

## 2024-05-03 DIAGNOSIS — I25.118 CORONARY ARTERY DISEASE OF NATIVE ARTERY OF NATIVE HEART WITH STABLE ANGINA PECTORIS (H): Primary | ICD-10-CM

## 2024-05-03 LAB — LVEF ECHO: NORMAL

## 2024-05-03 PROCEDURE — 93306 TTE W/DOPPLER COMPLETE: CPT | Mod: 26 | Performed by: INTERNAL MEDICINE

## 2024-05-03 PROCEDURE — 999N000208 ECHOCARDIOGRAM COMPLETE

## 2024-05-03 PROCEDURE — 255N000002 HC RX 255 OP 636: Performed by: INTERNAL MEDICINE

## 2024-05-03 PROCEDURE — C8929 TTE W OR WO FOL WCON,DOPPLER: HCPCS

## 2024-05-03 RX ORDER — POTASSIUM CHLORIDE 1500 MG/1
20 TABLET, EXTENDED RELEASE ORAL
Status: CANCELLED | OUTPATIENT
Start: 2024-05-03

## 2024-05-03 RX ORDER — ASPIRIN 81 MG/1
243 TABLET, CHEWABLE ORAL ONCE
Status: CANCELLED | OUTPATIENT
Start: 2024-05-03

## 2024-05-03 RX ORDER — ASPIRIN 325 MG
325 TABLET ORAL ONCE
Status: CANCELLED | OUTPATIENT
Start: 2024-05-03 | End: 2024-05-03

## 2024-05-03 RX ORDER — SODIUM CHLORIDE 9 MG/ML
INJECTION, SOLUTION INTRAVENOUS CONTINUOUS
Status: CANCELLED | OUTPATIENT
Start: 2024-05-03

## 2024-05-03 RX ORDER — LIDOCAINE 40 MG/G
CREAM TOPICAL
Status: CANCELLED | OUTPATIENT
Start: 2024-05-03

## 2024-05-03 RX ADMIN — HUMAN ALBUMIN MICROSPHERES AND PERFLUTREN 9 ML: 10; .22 INJECTION, SOLUTION INTRAVENOUS at 14:40

## 2024-05-03 NOTE — TELEPHONE ENCOUNTER
Addendum 1445: Spoke to patient's wife, they are not at home. They will call back later when they are home.   ======    Called patient to review prep for procedure, left a message to call back.   Orders placed. No approval necessary by insurance.     Coronary angiogram/PCI/Right Heart Cath prep instructions.     Patient is scheduled for a Left heart catheterization, Coronary Angio w/possible PCI at [facility] - [address], [place] [address] - Main Entrance of the Hospital on Thursday 5/9/24.  Check in time is at 0730 and procedure to follow.    Patient instructed to remain NPO for solid foods 8 hours prior to arrival and may have clear liquids up to 2 hours prior to arrival.    Patient does not require extra fluids prior to procedure.    Patient is not diabetic.    Patient is on Edoxaban (Eliquis, Pradaxa, Xarelto) and has been advised to hold for 2 days prior to procedure starting 5/7/24.  Patient can resume after the procedure.    Patient is taking spironolacone and is taking furosemide and has been instructed to hold it the morning of procedure.     Patient is taking ASA 81mg daily and will take 4 tabs (324mg) the morning of the procedure.    Pt is not on a SGLT2 inhibitor.    Pt is not on a GLP-1 Agonist    Patient advised to take their other daily medications the morning of the procedure with small sips of water.     Verified patient does not have a contrast allergy.    Verified patient has someone available to drive them home from the hospital and can stay with them for 24 hours after the procedure.     Patient advised to notify care team with any new COVID like symptoms prior to procedure. Day of procedure phone number: [place] at [phone]    Patient is aware of visitor policy.    Patient expresses understanding of above instructions and denies further questions at this time.      Hedy Tomas RN  [facility]

## 2024-05-03 NOTE — TELEPHONE ENCOUNTER
Spoke with patient and spouse to review cath prep. Wife notes they are also waiting for the [facility] to send orders for a brain scan due to the c/o of facial numbness. They were told his pacemaker is compatible.

## 2024-05-08 ENCOUNTER — TELEPHONE (OUTPATIENT)
Dept: CARDIOLOGY | Facility: CLINIC | Age: 84
End: 2024-05-08
Payer: OTHER GOVERNMENT

## 2024-05-08 NOTE — TELEPHONE ENCOUNTER
Patient's wife left a voicemail that they have some additional medication questions prior to the patient's angiogram tomorrow.     Called and spoke to Christina, says she isn't sure if the patient is going to need plavix after his procedure and didn't know if he would be on aspirin/plavix/edoxaban altogether. Reviewed that recommendation for plavix would be dependent on the findings of the angiogram. Discussed that triple therapy is sometimes needed short-term, but they will review this tomorrow after the procedure should it be indicated. Christina expressed understanding.

## 2024-05-09 ENCOUNTER — HOSPITAL ENCOUNTER (OUTPATIENT)
Facility: CLINIC | Age: 84
Setting detail: OBSERVATION
Discharge: HOME | End: 2024-05-10
Admitting: INTERNAL MEDICINE
Payer: COMMERCIAL

## 2024-05-09 DIAGNOSIS — R06.09 DOE (DYSPNEA ON EXERTION): ICD-10-CM

## 2024-05-09 DIAGNOSIS — Z98.890 STATUS POST CORONARY ANGIOGRAM: Primary | ICD-10-CM

## 2024-05-09 DIAGNOSIS — I25.118 CORONARY ARTERY DISEASE OF NATIVE ARTERY OF NATIVE HEART WITH STABLE ANGINA PECTORIS (H): ICD-10-CM

## 2024-05-09 LAB
ACT BLD: 255 SECONDS (ref 74–150)
ACT BLD: 255 SECONDS (ref 74–150)
ACT BLD: 274 SECONDS (ref 74–150)
ACT BLD: 290 SECONDS (ref 74–150)
ANION GAP SERPL CALCULATED.3IONS-SCNC: 7 MMOL/L (ref 7–15)
APTT PPP: 26 SECONDS (ref 22–38)
BUN SERPL-MCNC: 14.2 MG/DL (ref 8–23)
CALCIUM SERPL-MCNC: 8.9 MG/DL (ref 8.8–10.2)
CHLORIDE SERPL-SCNC: 105 MMOL/L (ref 98–107)
CREAT SERPL-MCNC: 0.98 MG/DL (ref 0.67–1.17)
DEPRECATED HCO3 PLAS-SCNC: 30 MMOL/L (ref 22–29)
EGFRCR SERPLBLD CKD-EPI 2021: 76 ML/MIN/1.73M2
ERYTHROCYTE [DISTWIDTH] IN BLOOD BY AUTOMATED COUNT: 12 % (ref 10–15)
GLUCOSE SERPL-MCNC: 98 MG/DL (ref 70–99)
HCT VFR BLD AUTO: 39.7 % (ref 40–53)
HGB BLD-MCNC: 13.2 G/DL (ref 13.3–17.7)
INR PPP: 1.11 (ref 0.85–1.15)
MCH RBC QN AUTO: 32.7 PG (ref 26.5–33)
MCHC RBC AUTO-ENTMCNC: 33.2 G/DL (ref 31.5–36.5)
MCV RBC AUTO: 98 FL (ref 78–100)
MDC_IDC_LEAD_CONNECTION_STATUS: NORMAL
MDC_IDC_LEAD_CONNECTION_STATUS: NORMAL
MDC_IDC_LEAD_IMPLANT_DT: NORMAL
MDC_IDC_LEAD_IMPLANT_DT: NORMAL
MDC_IDC_LEAD_LOCATION: NORMAL
MDC_IDC_LEAD_LOCATION: NORMAL
MDC_IDC_LEAD_LOCATION_DETAIL_1: NORMAL
MDC_IDC_LEAD_LOCATION_DETAIL_1: NORMAL
MDC_IDC_LEAD_MFG: NORMAL
MDC_IDC_LEAD_MFG: NORMAL
MDC_IDC_LEAD_MODEL: NORMAL
MDC_IDC_LEAD_MODEL: NORMAL
MDC_IDC_LEAD_POLARITY_TYPE: NORMAL
MDC_IDC_LEAD_POLARITY_TYPE: NORMAL
MDC_IDC_LEAD_SERIAL: NORMAL
MDC_IDC_LEAD_SERIAL: NORMAL
MDC_IDC_MSMT_BATTERY_REMAINING_LONGEVITY: 90 MO
MDC_IDC_MSMT_BATTERY_STATUS: NORMAL
MDC_IDC_MSMT_BATTERY_VOLTAGE: 2.98 V
MDC_IDC_MSMT_LEADCHNL_RA_IMPEDANCE_VALUE: 400 OHM
MDC_IDC_MSMT_LEADCHNL_RA_PACING_THRESHOLD_AMPLITUDE: 0.5 V
MDC_IDC_MSMT_LEADCHNL_RA_PACING_THRESHOLD_AMPLITUDE: 0.62 V
MDC_IDC_MSMT_LEADCHNL_RA_PACING_THRESHOLD_PULSEWIDTH: 0.4 MS
MDC_IDC_MSMT_LEADCHNL_RA_PACING_THRESHOLD_PULSEWIDTH: 0.4 MS
MDC_IDC_MSMT_LEADCHNL_RA_SENSING_INTR_AMPL: 3.8 MV
MDC_IDC_MSMT_LEADCHNL_RV_IMPEDANCE_VALUE: 550 OHM
MDC_IDC_MSMT_LEADCHNL_RV_PACING_THRESHOLD_AMPLITUDE: 0.75 V
MDC_IDC_MSMT_LEADCHNL_RV_PACING_THRESHOLD_AMPLITUDE: 0.75 V
MDC_IDC_MSMT_LEADCHNL_RV_PACING_THRESHOLD_PULSEWIDTH: 0.4 MS
MDC_IDC_MSMT_LEADCHNL_RV_PACING_THRESHOLD_PULSEWIDTH: 0.4 MS
MDC_IDC_MSMT_LEADCHNL_RV_SENSING_INTR_AMPL: 8.8 MV
MDC_IDC_PG_IMPLANT_DTM: NORMAL
MDC_IDC_PG_MFG: NORMAL
MDC_IDC_PG_MODEL: NORMAL
MDC_IDC_PG_SERIAL: NORMAL
MDC_IDC_PG_TYPE: NORMAL
MDC_IDC_SESS_CLINIC_NAME: NORMAL
MDC_IDC_SESS_DTM: NORMAL
MDC_IDC_SESS_TYPE: NORMAL
MDC_IDC_SET_BRADY_AT_MODE_SWITCH_MODE: NORMAL
MDC_IDC_SET_BRADY_AT_MODE_SWITCH_RATE: 180 {BEATS}/MIN
MDC_IDC_SET_BRADY_HYSTRATE: NORMAL
MDC_IDC_SET_BRADY_LOWRATE: 60 {BEATS}/MIN
MDC_IDC_SET_BRADY_MAX_SENSOR_RATE: 130 {BEATS}/MIN
MDC_IDC_SET_BRADY_MAX_TRACKING_RATE: 120 {BEATS}/MIN
MDC_IDC_SET_BRADY_MODE: NORMAL
MDC_IDC_SET_BRADY_NIGHT_RATE: NORMAL
MDC_IDC_SET_BRADY_PAV_DELAY_LOW: 200 MS
MDC_IDC_SET_BRADY_SAV_DELAY_LOW: 170 MS
MDC_IDC_SET_LEADCHNL_RA_PACING_AMPLITUDE: 1.62
MDC_IDC_SET_LEADCHNL_RA_PACING_CAPTURE_MODE: NORMAL
MDC_IDC_SET_LEADCHNL_RA_PACING_POLARITY: NORMAL
MDC_IDC_SET_LEADCHNL_RA_PACING_PULSEWIDTH: 0.4 MS
MDC_IDC_SET_LEADCHNL_RA_SENSING_ADAPTATION_MODE: NORMAL
MDC_IDC_SET_LEADCHNL_RA_SENSING_POLARITY: NORMAL
MDC_IDC_SET_LEADCHNL_RA_SENSING_SENSITIVITY: 0.3 MV
MDC_IDC_SET_LEADCHNL_RV_PACING_AMPLITUDE: 1 V
MDC_IDC_SET_LEADCHNL_RV_PACING_CAPTURE_MODE: NORMAL
MDC_IDC_SET_LEADCHNL_RV_PACING_POLARITY: NORMAL
MDC_IDC_SET_LEADCHNL_RV_PACING_PULSEWIDTH: 0.4 MS
MDC_IDC_SET_LEADCHNL_RV_SENSING_POLARITY: NORMAL
MDC_IDC_SET_LEADCHNL_RV_SENSING_SENSITIVITY: 0.5 MV
MDC_IDC_STAT_AT_MODE_SW_COUNT: 0
MDC_IDC_STAT_BRADY_RA_PERCENT_PACED: 76 %
MDC_IDC_STAT_BRADY_RV_PERCENT_PACED: 42 %
PLATELET # BLD AUTO: 163 10E3/UL (ref 150–450)
POTASSIUM SERPL-SCNC: 4.3 MMOL/L (ref 3.4–5.3)
RBC # BLD AUTO: 4.04 10E6/UL (ref 4.4–5.9)
SODIUM SERPL-SCNC: 142 MMOL/L (ref 135–145)
WBC # BLD AUTO: 4.5 10E3/UL (ref 4–11)

## 2024-05-09 PROCEDURE — C1724 CATH, TRANS ATHEREC,ROTATION: HCPCS | Performed by: INTERNAL MEDICINE

## 2024-05-09 PROCEDURE — 92933 PRQ TRLML C ATHRC ST ANGIOP1: CPT | Mod: LD | Performed by: INTERNAL MEDICINE

## 2024-05-09 PROCEDURE — 92978 ENDOLUMINL IVUS OCT C 1ST: CPT | Performed by: INTERNAL MEDICINE

## 2024-05-09 PROCEDURE — 210N000002 HC R&B HEART CARE

## 2024-05-09 PROCEDURE — 85027 COMPLETE CBC AUTOMATED: CPT | Performed by: INTERNAL MEDICINE

## 2024-05-09 PROCEDURE — 85610 PROTHROMBIN TIME: CPT | Performed by: INTERNAL MEDICINE

## 2024-05-09 PROCEDURE — 33990 INSJ PERQ VAD L HRT ARTERIAL: CPT | Mod: GC | Performed by: INTERNAL MEDICINE

## 2024-05-09 PROCEDURE — C1753 CATH, INTRAVAS ULTRASOUND: HCPCS | Performed by: INTERNAL MEDICINE

## 2024-05-09 PROCEDURE — 999N000054 HC STATISTIC EKG NON-CHARGEABLE

## 2024-05-09 PROCEDURE — 93005 ELECTROCARDIOGRAM TRACING: CPT

## 2024-05-09 PROCEDURE — 250N000011 HC RX IP 250 OP 636: Performed by: INTERNAL MEDICINE

## 2024-05-09 PROCEDURE — 250N000013 HC RX MED GY IP 250 OP 250 PS 637: Performed by: HOSPITALIST

## 2024-05-09 PROCEDURE — 84295 ASSAY OF SERUM SODIUM: CPT | Performed by: INTERNAL MEDICINE

## 2024-05-09 PROCEDURE — 272N000001 HC OR GENERAL SUPPLY STERILE: Performed by: INTERNAL MEDICINE

## 2024-05-09 PROCEDURE — C9602 PERC D-E COR STENT ATHER S: HCPCS | Mod: LM | Performed by: INTERNAL MEDICINE

## 2024-05-09 PROCEDURE — 93010 ELECTROCARDIOGRAM REPORT: CPT | Mod: 76 | Performed by: INTERNAL MEDICINE

## 2024-05-09 PROCEDURE — 250N000011 HC RX IP 250 OP 636: Performed by: HOSPITALIST

## 2024-05-09 PROCEDURE — 250N000013 HC RX MED GY IP 250 OP 250 PS 637: Performed by: INTERNAL MEDICINE

## 2024-05-09 PROCEDURE — 85730 THROMBOPLASTIN TIME PARTIAL: CPT | Performed by: INTERNAL MEDICINE

## 2024-05-09 PROCEDURE — C1769 GUIDE WIRE: HCPCS | Performed by: INTERNAL MEDICINE

## 2024-05-09 PROCEDURE — 99152 MOD SED SAME PHYS/QHP 5/>YRS: CPT | Mod: GC | Performed by: INTERNAL MEDICINE

## 2024-05-09 PROCEDURE — 258N000003 HC RX IP 258 OP 636: Performed by: INTERNAL MEDICINE

## 2024-05-09 PROCEDURE — 93458 L HRT ARTERY/VENTRICLE ANGIO: CPT | Performed by: INTERNAL MEDICINE

## 2024-05-09 PROCEDURE — 85347 COAGULATION TIME ACTIVATED: CPT

## 2024-05-09 PROCEDURE — 250N000009 HC RX 250: Performed by: INTERNAL MEDICINE

## 2024-05-09 PROCEDURE — C1894 INTRO/SHEATH, NON-LASER: HCPCS | Performed by: INTERNAL MEDICINE

## 2024-05-09 PROCEDURE — 93452 LEFT HRT CATH W/VENTRCLGRPHY: CPT | Performed by: INTERNAL MEDICINE

## 2024-05-09 PROCEDURE — 999N000071 HC STATISTIC HEART CATH LAB OR EP LAB

## 2024-05-09 PROCEDURE — 92978 ENDOLUMINL IVUS OCT C 1ST: CPT | Mod: 26 | Performed by: INTERNAL MEDICINE

## 2024-05-09 PROCEDURE — 999N000184 HC STATISTIC TELEMETRY

## 2024-05-09 PROCEDURE — 99254 IP/OBS CNSLTJ NEW/EST MOD 60: CPT | Performed by: PHYSICIAN ASSISTANT

## 2024-05-09 PROCEDURE — C1760 CLOSURE DEV, VASC: HCPCS | Performed by: INTERNAL MEDICINE

## 2024-05-09 PROCEDURE — C1887 CATHETER, GUIDING: HCPCS | Performed by: INTERNAL MEDICINE

## 2024-05-09 PROCEDURE — C1725 CATH, TRANSLUMIN NON-LASER: HCPCS | Performed by: INTERNAL MEDICINE

## 2024-05-09 PROCEDURE — 36415 COLL VENOUS BLD VENIPUNCTURE: CPT | Performed by: INTERNAL MEDICINE

## 2024-05-09 PROCEDURE — C1874 STENT, COATED/COV W/DEL SYS: HCPCS | Performed by: INTERNAL MEDICINE

## 2024-05-09 PROCEDURE — 33990 INSJ PERQ VAD L HRT ARTERIAL: CPT | Performed by: INTERNAL MEDICINE

## 2024-05-09 DEVICE — DEVICE CLOSURE VASCULAR MANTA 14FR 2156: Type: IMPLANTABLE DEVICE | Status: FUNCTIONAL

## 2024-05-09 DEVICE — STENT CORONARY DES SYNERGY XD MR US 3.00X38MM H7493941838300: Type: IMPLANTABLE DEVICE | Status: FUNCTIONAL

## 2024-05-09 RX ORDER — CLOPIDOGREL BISULFATE 75 MG/1
TABLET ORAL
Status: DISCONTINUED | OUTPATIENT
Start: 2024-05-09 | End: 2024-05-09 | Stop reason: HOSPADM

## 2024-05-09 RX ORDER — ATROPINE SULFATE 0.1 MG/ML
0.5 INJECTION INTRAVENOUS
Status: ACTIVE | OUTPATIENT
Start: 2024-05-09 | End: 2024-05-09

## 2024-05-09 RX ORDER — HYDRALAZINE HYDROCHLORIDE 20 MG/ML
10 INJECTION INTRAMUSCULAR; INTRAVENOUS EVERY 4 HOURS PRN
Status: DISCONTINUED | OUTPATIENT
Start: 2024-05-09 | End: 2024-05-10 | Stop reason: HOSPADM

## 2024-05-09 RX ORDER — ACETAMINOPHEN 325 MG/1
650 TABLET ORAL EVERY 4 HOURS PRN
Status: DISCONTINUED | OUTPATIENT
Start: 2024-05-09 | End: 2024-05-10 | Stop reason: HOSPADM

## 2024-05-09 RX ORDER — PROTAMINE SULFATE 10 MG/ML
20 INJECTION, SOLUTION INTRAVENOUS ONCE
Status: COMPLETED | OUTPATIENT
Start: 2024-05-09 | End: 2024-05-09

## 2024-05-09 RX ORDER — NALOXONE HYDROCHLORIDE 0.4 MG/ML
0.4 INJECTION, SOLUTION INTRAMUSCULAR; INTRAVENOUS; SUBCUTANEOUS
Status: ACTIVE | OUTPATIENT
Start: 2024-05-09 | End: 2024-05-09

## 2024-05-09 RX ORDER — ASPIRIN 81 MG/1
81 TABLET ORAL DAILY
Status: DISCONTINUED | OUTPATIENT
Start: 2024-05-10 | End: 2024-05-10 | Stop reason: HOSPADM

## 2024-05-09 RX ORDER — SODIUM CHLORIDE 9 MG/ML
INJECTION, SOLUTION INTRAVENOUS CONTINUOUS
Status: ACTIVE | OUTPATIENT
Start: 2024-05-09 | End: 2024-05-09

## 2024-05-09 RX ORDER — SODIUM CHLORIDE 9 MG/ML
INJECTION, SOLUTION INTRAVENOUS CONTINUOUS
Status: DISCONTINUED | OUTPATIENT
Start: 2024-05-09 | End: 2024-05-09 | Stop reason: HOSPADM

## 2024-05-09 RX ORDER — OXYCODONE HYDROCHLORIDE 5 MG/1
10 TABLET ORAL EVERY 4 HOURS PRN
Status: DISCONTINUED | OUTPATIENT
Start: 2024-05-09 | End: 2024-05-10 | Stop reason: HOSPADM

## 2024-05-09 RX ORDER — FLUMAZENIL 0.1 MG/ML
0.2 INJECTION, SOLUTION INTRAVENOUS
Status: ACTIVE | OUTPATIENT
Start: 2024-05-09 | End: 2024-05-09

## 2024-05-09 RX ORDER — ONDANSETRON 2 MG/ML
4 INJECTION INTRAMUSCULAR; INTRAVENOUS EVERY 6 HOURS PRN
Status: DISCONTINUED | OUTPATIENT
Start: 2024-05-09 | End: 2024-05-10 | Stop reason: HOSPADM

## 2024-05-09 RX ORDER — NALOXONE HYDROCHLORIDE 0.4 MG/ML
0.2 INJECTION, SOLUTION INTRAMUSCULAR; INTRAVENOUS; SUBCUTANEOUS
Status: ACTIVE | OUTPATIENT
Start: 2024-05-09 | End: 2024-05-09

## 2024-05-09 RX ORDER — ASPIRIN 325 MG
325 TABLET ORAL ONCE
Status: COMPLETED | OUTPATIENT
Start: 2024-05-09 | End: 2024-05-09

## 2024-05-09 RX ORDER — ONDANSETRON 4 MG/1
4 TABLET, ORALLY DISINTEGRATING ORAL EVERY 6 HOURS PRN
Status: DISCONTINUED | OUTPATIENT
Start: 2024-05-09 | End: 2024-05-10 | Stop reason: HOSPADM

## 2024-05-09 RX ORDER — CLOPIDOGREL BISULFATE 75 MG/1
75 TABLET ORAL DAILY
Qty: 90 TABLET | Refills: 3 | Status: SHIPPED | OUTPATIENT
Start: 2024-05-10

## 2024-05-09 RX ORDER — POTASSIUM CHLORIDE 1500 MG/1
20 TABLET, EXTENDED RELEASE ORAL
Status: DISCONTINUED | OUTPATIENT
Start: 2024-05-09 | End: 2024-05-09 | Stop reason: HOSPADM

## 2024-05-09 RX ORDER — ATORVASTATIN CALCIUM 80 MG/1
80 TABLET, FILM COATED ORAL EVERY EVENING
Status: DISCONTINUED | OUTPATIENT
Start: 2024-05-09 | End: 2024-05-10 | Stop reason: HOSPADM

## 2024-05-09 RX ORDER — TAMSULOSIN HYDROCHLORIDE 0.4 MG/1
0.4 CAPSULE ORAL DAILY
Status: DISCONTINUED | OUTPATIENT
Start: 2024-05-10 | End: 2024-05-10 | Stop reason: HOSPADM

## 2024-05-09 RX ORDER — NITROGLYCERIN 0.4 MG/1
0.4 TABLET SUBLINGUAL EVERY 5 MIN PRN
Status: DISCONTINUED | OUTPATIENT
Start: 2024-05-09 | End: 2024-05-10 | Stop reason: HOSPADM

## 2024-05-09 RX ORDER — PROPRANOLOL HYDROCHLORIDE 120 MG/1
120 CAPSULE, EXTENDED RELEASE ORAL DAILY
Status: DISCONTINUED | OUTPATIENT
Start: 2024-05-10 | End: 2024-05-10 | Stop reason: HOSPADM

## 2024-05-09 RX ORDER — FINASTERIDE 5 MG/1
5 TABLET, FILM COATED ORAL DAILY
Status: DISCONTINUED | OUTPATIENT
Start: 2024-05-10 | End: 2024-05-10 | Stop reason: HOSPADM

## 2024-05-09 RX ORDER — LIDOCAINE 40 MG/G
CREAM TOPICAL
Status: DISCONTINUED | OUTPATIENT
Start: 2024-05-09 | End: 2024-05-09 | Stop reason: HOSPADM

## 2024-05-09 RX ORDER — PANTOPRAZOLE SODIUM 40 MG/1
40 TABLET, DELAYED RELEASE ORAL DAILY
Status: DISCONTINUED | OUTPATIENT
Start: 2024-05-09 | End: 2024-05-10 | Stop reason: HOSPADM

## 2024-05-09 RX ORDER — LANOLIN ALCOHOL/MO/W.PET/CERES
1000 CREAM (GRAM) TOPICAL DAILY
Status: DISCONTINUED | OUTPATIENT
Start: 2024-05-10 | End: 2024-05-10 | Stop reason: HOSPADM

## 2024-05-09 RX ORDER — METOPROLOL TARTRATE 1 MG/ML
5 INJECTION, SOLUTION INTRAVENOUS
Status: DISCONTINUED | OUTPATIENT
Start: 2024-05-09 | End: 2024-05-10 | Stop reason: HOSPADM

## 2024-05-09 RX ORDER — IOPAMIDOL 755 MG/ML
INJECTION, SOLUTION INTRAVASCULAR
Status: DISCONTINUED | OUTPATIENT
Start: 2024-05-09 | End: 2024-05-09 | Stop reason: HOSPADM

## 2024-05-09 RX ORDER — OXYCODONE HYDROCHLORIDE 5 MG/1
5 TABLET ORAL EVERY 4 HOURS PRN
Status: DISCONTINUED | OUTPATIENT
Start: 2024-05-09 | End: 2024-05-10 | Stop reason: HOSPADM

## 2024-05-09 RX ORDER — DOCUSATE SODIUM 100 MG/1
100 CAPSULE, LIQUID FILLED ORAL 2 TIMES DAILY PRN
Status: DISCONTINUED | OUTPATIENT
Start: 2024-05-09 | End: 2024-05-10 | Stop reason: HOSPADM

## 2024-05-09 RX ORDER — ASPIRIN 81 MG/1
81 TABLET ORAL DAILY
Status: DISCONTINUED | OUTPATIENT
Start: 2024-05-10 | End: 2024-05-09

## 2024-05-09 RX ORDER — NITROGLYCERIN 0.4 MG/1
0.4 TABLET SUBLINGUAL EVERY 5 MIN PRN
Status: DISCONTINUED | OUTPATIENT
Start: 2024-05-09 | End: 2024-05-09

## 2024-05-09 RX ORDER — FENTANYL CITRATE 50 UG/ML
INJECTION, SOLUTION INTRAMUSCULAR; INTRAVENOUS
Status: DISCONTINUED | OUTPATIENT
Start: 2024-05-09 | End: 2024-05-09 | Stop reason: HOSPADM

## 2024-05-09 RX ORDER — ASPIRIN 81 MG/1
243 TABLET, CHEWABLE ORAL ONCE
Status: COMPLETED | OUTPATIENT
Start: 2024-05-09 | End: 2024-05-09

## 2024-05-09 RX ORDER — CLOPIDOGREL BISULFATE 75 MG/1
75 TABLET ORAL DAILY
Status: DISCONTINUED | OUTPATIENT
Start: 2024-05-10 | End: 2024-05-10 | Stop reason: HOSPADM

## 2024-05-09 RX ORDER — SODIUM FLUORIDE 5 MG/G
GEL, DENTIFRICE DENTAL AT BEDTIME
Status: DISCONTINUED | OUTPATIENT
Start: 2024-05-09 | End: 2024-05-09

## 2024-05-09 RX ORDER — VERAPAMIL HYDROCHLORIDE 2.5 MG/ML
INJECTION, SOLUTION INTRAVENOUS
Status: DISCONTINUED | OUTPATIENT
Start: 2024-05-09 | End: 2024-05-09 | Stop reason: HOSPADM

## 2024-05-09 RX ORDER — FENTANYL CITRATE 50 UG/ML
25 INJECTION, SOLUTION INTRAMUSCULAR; INTRAVENOUS
Status: COMPLETED | OUTPATIENT
Start: 2024-05-09 | End: 2024-05-09

## 2024-05-09 RX ORDER — FUROSEMIDE 20 MG
20 TABLET ORAL DAILY
Status: DISCONTINUED | OUTPATIENT
Start: 2024-05-10 | End: 2024-05-10 | Stop reason: HOSPADM

## 2024-05-09 RX ORDER — ASPIRIN 81 MG/1
81 TABLET, CHEWABLE ORAL ONCE
Status: DISCONTINUED | OUTPATIENT
Start: 2024-05-09 | End: 2024-05-09

## 2024-05-09 RX ORDER — NITROGLYCERIN 5 MG/ML
VIAL (ML) INTRAVENOUS
Status: DISCONTINUED | OUTPATIENT
Start: 2024-05-09 | End: 2024-05-09 | Stop reason: HOSPADM

## 2024-05-09 RX ORDER — HEPARIN SODIUM 1000 [USP'U]/ML
INJECTION, SOLUTION INTRAVENOUS; SUBCUTANEOUS
Status: DISCONTINUED | OUTPATIENT
Start: 2024-05-09 | End: 2024-05-09 | Stop reason: HOSPADM

## 2024-05-09 RX ORDER — SPIRONOLACTONE 25 MG/1
25 TABLET ORAL DAILY
Status: DISCONTINUED | OUTPATIENT
Start: 2024-05-10 | End: 2024-05-10 | Stop reason: HOSPADM

## 2024-05-09 RX ORDER — LOSARTAN POTASSIUM 50 MG/1
50 TABLET ORAL DAILY
Status: DISCONTINUED | OUTPATIENT
Start: 2024-05-10 | End: 2024-05-10 | Stop reason: HOSPADM

## 2024-05-09 RX ADMIN — ONDANSETRON 4 MG: 2 INJECTION INTRAMUSCULAR; INTRAVENOUS at 14:00

## 2024-05-09 RX ADMIN — ONDANSETRON 4 MG: 4 TABLET, ORALLY DISINTEGRATING ORAL at 15:09

## 2024-05-09 RX ADMIN — OXYCODONE HYDROCHLORIDE 5 MG: 5 TABLET ORAL at 15:14

## 2024-05-09 RX ADMIN — FENTANYL CITRATE 25 MCG: 50 INJECTION, SOLUTION INTRAMUSCULAR; INTRAVENOUS at 14:14

## 2024-05-09 RX ADMIN — SODIUM CHLORIDE: 9 INJECTION, SOLUTION INTRAVENOUS at 07:46

## 2024-05-09 RX ADMIN — PROTAMINE SULFATE 20 MG: 10 INJECTION, SOLUTION INTRAVENOUS at 14:03

## 2024-05-09 RX ADMIN — FENTANYL CITRATE 50 MCG: 50 INJECTION, SOLUTION INTRAMUSCULAR; INTRAVENOUS at 13:47

## 2024-05-09 ASSESSMENT — ACTIVITIES OF DAILY LIVING (ADL)
ADLS_ACUITY_SCORE: 36
ADLS_ACUITY_SCORE: 42
ADLS_ACUITY_SCORE: 36
ADLS_ACUITY_SCORE: 42
ADLS_ACUITY_SCORE: 36
ADLS_ACUITY_SCORE: 35
ADLS_ACUITY_SCORE: 42
ADLS_ACUITY_SCORE: 36
ADLS_ACUITY_SCORE: 35

## 2024-05-09 NOTE — Clinical Note
Atherectomy performed in the left anterior descending. Rotational atherectomy was performed. Pass rate = 160 RPM. Pass duration = 10 seconds.

## 2024-05-09 NOTE — CONSULTS
"[facility]  Consult- Hospitalist Service  Date of Admission:  5/9/2024  PRIMARY CARE PROVIDER:  [facility]    Assessment & Plan           Assessment/Plan:      Tony Bruce is a 83 year old male , with a PMH of Cad, s/p mult past stents, who was admitted on 5/9/2024., for elective coronary angiogram, and was stented, had postprocedure bleeding at the arterial access sites requiring admission.  Hospital medicine consulted by cardiology.. .    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 SSS S.p Pacer  diagonal PCI. Known Lad stenosis. Echo 5/3 w normal EF, S/p 5/9 outpt L heart angiogram, s/p Successful complex PCI of the LM w arthrectomy, w 1 GER stent . Admitted d/t persistent bleeding R  femoral angio access site, staying overnight,.Groin tx w pressure, fem-stop (now off).  Had a TR band for radial site, No current bleeding at both site. Cards resumed all PTA meds Hosp med consulted for unclear reason,  .   - cards is primary service  - s/p Complex Stent on LM 5/9   - HOLD PTA edoxaban QD (held 2d prior, restart per cards)   -PTA ASA 81 stopped   --changed to Plavix post stent (per cards)  - PTA losartan 50QD  - PTA propranolol  - PTA spironolactone. Lasix,    - PTA atorvastatin  - am CBC BMP  - Hosp medicine will sign off at this time, no acute medical issues noted. Please reconsult or call if new concerns or issues.     Hx essential tremor (no note/dx avail)  - PTA primidone TID resumed    B12 def  - PTA oral B12 resumed    BPH  - PTA flomax, proscar resumed    Obesity  LIZANDRO, does not use CPAP  Pt states LIZANDRO was \"borderline\" after sleep study 6 yrs ago, did not get/use CPAP, and wary . Recommend he get a new sleep study to lessen " "CV and mult other risks from untx LIZANDRO  - F/u [facility] PCP f.u w new sleep study    Pt confirms prior DNR/DNI status in discussions with me today.  Order entered.     Clinically Significant Risk Factors Present on Admission               # Drug Induced Coagulation Defect: home medication list includes an anticoagulant medication  # Drug Induced Platelet Defect: home medication list includes an antiplatelet medication   # Hypertension: Noted on problem list      # Obesity: Estimated body mass index is 36.83 kg/m  as calculated from the following:    Height as of this encounter: 1.727 m (5' 8\").    Weight as of this encounter: 109.9 kg (242 lb 3.2 oz).        # Pacemaker present        Diet: Low Saturated Fat Na <2400 mg    DVT Prophylaxis: Defer to primary service  Stratton Catheter: PRESENT, indication:    Lines: None     Cardiac Monitoring: ACTIVE order. Indication: Post- PCI/Angiogram (24 hours)  Code Status: Full Code    Disposition Plan      Expected Discharge Date: 05/11/2024             The patient has been discussed in detail with the [facility] hospitalist, who agrees with the assessment and plan as noted  The patient's care was discussed with the Attending Physician,   , Bedside Nurse, Patient, and Patient's Family.  Entered: Kale Bender PA-C 05/09/2024, 6:32 PM       Kale Bender PA-C  [facility]  Securely message with the Vocera Web Console (learn more here)  Text page via [facility] Paging/Directory     ______________________________________________________________________    Chief Complaint         Subjective:        History of Present Illness   History is obtained from the ED provider, patient and EMR.  Pt is considered a reliable historian    Met the patient for the first time.  I also met family briefly today with him in the room.  He states he feels fine postprocedure.  I am unclear if he understands fully but does know that he had a stent.  Patient does not endorse any " current chest pain pressure.  He has no current ÁLVAREZ but he has not been walking because he has had to be on bedrest due to his postprocedural bleeding.  Patient does not have any abdominal pain.  No new sx offered    -PMH briefly reviewed-Regarding LIZANDRO he states he does not think he really had it after his study and never actually took CPAP.  However noting his multiple comorbidities, I recommend he consider a new sleep study and consider an on CPAP treatment option if he indeed has it.  [facility]    -I cannot confirm any of his past history is as it is not available via chart review.    -I was asked to update his CODE STATUS, discussed with the patient that he is DNR/DNI and relatively clearly about that.  That was entered.      Past Medical History    I have reviewed this patient's medical history and updated it with pertinent information if needed.   Past Medical History:   Diagnosis Date    Coronary atherosclerosis of unspecified type of vessel, native or graft 08/2001    cardiac cath 5/2022: unsuccessful attempt to LAD-medical management; cath 2005: GER to LAD; cath 2002: GER to RCA    Essential hypertension, benign     INTERMITTENT HYPERTENSION    Essential tremor     Generalized osteoarthrosis, unspecified site     Hyperlipidaemia     Hypertrophy of prostate without urinary obstruction and other lower urinary tract symptoms (LUTS)     BPH - Dr Pinto    Impotence of organic origin     Dr Pinto - Dr Allred    NONSPECIFIC MEDICAL HISTORY     CERVICAL/LUMBAR RADICULOPATHY    NONSPECIFIC MEDICAL HISTORY     SHOULDER IMPINGEMENT    NSTEMI (non-ST elevated myocardial infarction) (H)     inferior 2001    Obese     Other and unspecified hyperlipidemia     ?    Peripheral neuropathy     Sick sinus syndrome (H)     s/p [employer] pacemaker implanted March 2022 in [place]     Prior to Admission Medications   Prior to Admission Medications   Prescriptions Last Dose  Informant Patient Reported? Taking?   Calcium Carb-Cholecalciferol (CALCIUM-VITAMIN D3) 250-125 MG-UNIT TABS 2024 at 0600  Yes Yes   Sig: Take 2 tablets by mouth 2 times daily   Emollient (VANICREAM EX) Unknown  Yes Yes   Sig: APPLY THIN LAYER    TWICE A DAY FOR DRY SKIN   Vitamin D, Cholecalciferol, 1000 units CAPS 2024 at 0600  Yes Yes   Sig: Take by mouth daily   acetaminophen 500 MG CAPS 2024 at 0600  Yes Yes   Si tablets twice a day   amoxicillin (AMOXIL) 500 MG capsule More than a month  No Yes   Sig: Take 4 capsules (2,000 mg) by mouth once as needed (30 minutes before dental procedure)   aspirin 81 MG EC tablet 2024 at 2000  No Yes   Sig: Take 1 tablet (81 mg) by mouth daily   atorvastatin (LIPITOR) 80 MG tablet 2024 at 2000  No Yes   Sig: Take 1 tablet (80 mg) by mouth daily   cyanocobalamin (VITAMIN B-12) 1000 MCG tablet Unknown  Yes Yes   Sig: Take by mouth daily   diclofenac (VOLTAREN) 1 % GEL topical gel Unknown  Yes Yes   Sig: Place onto the skin as needed for moderate pain   docusate sodium (COLACE) 100 MG capsule 2024 at 2000  Yes Yes   Sig: Take 100 mg by mouth 2 times daily as needed for constipation   edoxaban ANTICOAGULANT (SAVAYSA) 60 MG TABS tablet 2024 at 0600  No Yes   Sig: Take 1 tablet (60 mg) by mouth daily   finasteride (PROSCAR) 5 MG tablet 2024 at 0600  Yes Yes   Sig: Take 5 mg by mouth daily   furosemide (LASIX) 20 MG tablet 2024 at 0600  No Yes   Sig: Take 1 tablet (20 mg) by mouth daily   hydrocortisone 2.5 % cream Unknown  Yes Yes   Sig: Apply topically 2 times daily   lidocaine (LIDODERM) 5 % Patch Unknown  Yes Yes   Sig: Place 1 patch onto the skin   losartan (COZAAR) 50 MG tablet 2024 at 0600  No Yes   Sig: Take 1 tablet (50 mg) by mouth daily   nitroGLYcerin (NITROSTAT) 0.4 MG sublingual tablet Unknown  No Yes   Sig: One tablet under the tongue every 5 minutes if needed for chest pain. May repeat every 5 minutes for a maximum of 3  "doses in 15 minutes\"   omega 3 1000 MG CAPS 5/8/2024 at 2000  Yes Yes   Sig: Take by mouth daily   omeprazole (PRILOSEC) 20 MG DR capsule 5/8/2024 at 0600  Yes Yes   Sig: Take 20 mg by mouth daily   polyethylene glycol (MIRALAX/GLYCOLAX) Packet Unknown  Yes Yes   Sig: Take 1 packet by mouth as needed for constipation   primidone (MYSOLINE) 250 MG tablet 5/9/2024 at 0600  Yes Yes   Sig: Take 300 mg by mouth 2 times daily   propranolol ER (INDERAL LA) 120 MG 24 hr capsule 5/9/2024 at 0600  Yes Yes   Sig: Take 120 mg by mouth daily   psyllium 0.52 g capsule Unknown  Yes Yes   Sig: Take 1 capsule by mouth daily   sodium fluoride dental gel (PREVIDENT) 1.1 % GEL topical gel Unknown  Yes Yes   Sig: Apply to affected area At Bedtime   spironolactone (ALDACTONE) 25 MG tablet 5/9/2024 at 0600  No Yes   Sig: Take 1 tablet (25 mg) by mouth daily   tamsulosin (FLOMAX) 0.4 MG capsule 5/8/2024 at 2000  Yes Yes   Sig: Take 0.4 mg by mouth daily   terbinafine (LAMISIL) 1 % external cream Unknown  Yes Yes   Sig: Apply topically 2 times daily      Facility-Administered Medications: None     Allergies   Allergies   Allergen Reactions    Lisinopril     Morphine      confusion           Physical Exam                Objective/Exam:      Vital Signs: Temp: 97.8  F (36.6  C) Temp src: Oral BP: (!) 152/98 Pulse: 64   Resp: 14 SpO2: 97 % O2 Device: Nasal cannula Oxygen Delivery: 1 LPM  Vitals:    05/09/24 1630 05/09/24 1654 05/09/24 1715 05/09/24 1741   BP: (!) 155/87   (!) 152/98   BP Location: Left arm   Left arm   Pulse: 66   64   Resp: 15   14   Temp:       TempSrc:       SpO2: 100% 100% 100% 97%   Weight:       Height:         Weight: 242 lbs 3.2 oz    Lines/monitoring: PIV  Constitutional: Awake, alert, cooperative, no apparent distress.    ENT: Normocephalic, Normal sclera.    Neck: No JVD no adenopathy.  Pulmonary: on 2lp No increased work of breathing, good air exchange, clear to auscultation bilaterally, no crackles or " wheezing.  Cardiovascular: Regular rate and rhythm, normal S1 and S2,  and no murmur noted.  GI:  soft, non-distended, non-tender.  Obese habitus.  : Patient has a Stratton in place with some blood at the meatus where the Stratton enters.    Patient has marked swelling and ecchymosis over the right femoral groin access site.  He no longer has a femoropopliteal device in place.  Area is nontender.  Patient remains on bedrest at this time  Extremities: Patient has a TR band splint on the right wrist.  No lower extremity edema noted, and calves are non-tender to palpation bilaterally. Feet warm  Skin/Integumen: Visualized skin w/o infectious signs, warm, dry.  Neuro: Nonfocal   Psych:  Alert and oriented x 3. Normal affect.    Medical Decision Making       Please see A&P for additional details of medical decision making.  MANAGEMENT DISCUSSED with the following over the past 24 hours:     NOTE(S)/MEDICAL RECORDS REVIEWED over the past 24 hours:            Data   Data reviewed today: I reviewed all medications, new labs and imaging results over the last 24 hours.           Labs:    CBC with Diff:  Recent Labs   Lab Test 05/09/24  0737 05/25/22  0659   WBC 4.5 3.6*   HGB 13.2* 12.9*   MCV 98 102*    161      Comprehensive Metabolic Panel:  Recent Labs   Lab 05/09/24  0737      POTASSIUM 4.3   CHLORIDE 105   CO2 30*   BUN 14.2   CR 0.98   ANIONGAP 7   GFRESTIMATED 76   EFREM 8.9   GLC 98     Blood Glucose:  Recent Labs   Lab 05/09/24  0737   GLC 98     INR   INR   Date Value Ref Range Status   05/09/2024 1.11 0.85 - 1.15 Final   08/27/2008 1.02 0.86 - 1.14 Final            Imaging:    Cardiac Catheterization  Result Date: 5/9/2024  Successful complex PCI of the LM into the mid-LAD with Impella CP support, HD-IVUS guidance, rotational atherectomy, and placement of a single 3.0 x 38 mm Synergy GER, post-dilated up to 4.0 mm in the LM. Impella CP was removed from the RFA at the conclusion of the procedure with la nena Devine  closure device.            ECG Interpretation:    Not reviewed    ------------------------- PAST 24 HR DATA REVIEWED -----------------------------------------------    I have personally reviewed the following data over the past 24 hrs:    4.5  \   13.2 (L)   / 163     142 105 14.2 /  98   4.3 30 (H) 0.98 \     INR:  1.11 PTT:  26   D-dimer:  N/A Fibrinogen:  N/A         Time - I spent 50 minutes on the above w greater than 50% spent face to face counseling and including documentation and coordination of care    Pt was staffed and discussed in detail w Dr Mcallister, attending provider.The above assessment and plan is the product of our joint decision making.    The above form was partially completed using Dragon voice dictation software.  At times inadvertent word substitutions or word omissions may occur, not seen on proof read. Should any part of the documentation be unclear or otherwise contradictory, reader is encouraged to please contact me for clarification.

## 2024-05-09 NOTE — PROGRESS NOTES
Care Suites Post Procedure Note    Patient Information  Name: Tony Bruce  Age: 83 year old    Post Procedure  Time patient returned to Care Suites: 1210  Concerns/abnormal assessment: none at present  If abnormal assessment, provider notified: N/A  Plan/Other: monitor    Siva James RN

## 2024-05-09 NOTE — Clinical Note
The first balloon was inserted into the left anterior descending.Max pressure = 14 hi. Total duration = 16 seconds.     Max pressure = 20 hi. Total duration = 14 seconds.    Balloon reinflated a second time: Max pressure = 20 hi. Total duration = 14 seconds.

## 2024-05-09 NOTE — Clinical Note
Atherectomy performed in the left anterior descending. Rotational atherectomy was performed. Pass rate = 160 RPM. Pass duration = 12 seconds.

## 2024-05-09 NOTE — PROGRESS NOTES
Pt bleeding from RT groin   Manual pressure applied  Fellow over to hold pressure and assess as well

## 2024-05-09 NOTE — Clinical Note
The first balloon was inserted into the left anterior descending.Max pressure = 20 hi. Total duration = 14 seconds.     Max pressure = 20 hi. Total duration = 12 seconds.    Balloon reinflated a second time: Max pressure = 20 hi. Total duration = 12 seconds.  Balloon reinflated a third time: Max pressure = 20 hi. Total duration = 7 seconds.  Balloon reinflated a fourth time: Max pressure = 20 hi. Total duration = 7 seconds.  Balloo n reinflated a fourth time: Max pressure = 20 hi. Total duration = 7 seconds.

## 2024-05-09 NOTE — PROGRESS NOTES
Unable to achieve hemostasis after holding pressure x20 min. Call to cath lab, Dr. Blood informed - instructed to continue holding pressure and possible femstop placement. Cath lab RN at bedside to assess.

## 2024-05-09 NOTE — PRE-PROCEDURE
GENERAL PRE-PROCEDURE:   Procedure:  Cors +/- PCI  Date/Time:  5/9/2024 8:45 AM    Written consent obtained?: Yes    Risks and benefits: Risks, benefits and alternatives were discussed    Consent given by:  Patient  Patient states understanding of procedure being performed: Yes    Patient's understanding of procedure matches consent: Yes    Procedure consent matches procedure scheduled: Yes    Expected level of sedation:  Moderate  Appropriately NPO:  Yes  Mallampati  :  Grade 3- soft palate visible, posterior pharyngeal wall not visible  Lungs:  Lungs clear with good breath sounds bilaterally  Heart:  RRR  History & Physical reviewed:  History and physical reviewed and no updates needed  Statement of review:  I have reviewed the lab findings, diagnostic data, medications, and the plan for sedation

## 2024-05-09 NOTE — PROGRESS NOTES
Notified Person Name:Dr Blood    Notification Interaction:  Phone      Purpose of Notification: Bleeding continues after 15 minute holding pressure on right groin    Orders Received: yes    Comments: MD and cath lab staff arrived to start becker, make decision on fem stop, give zofran and pain medications

## 2024-05-09 NOTE — Clinical Note
The first balloon was inserted into the left anterior descending.Max pressure = 13 hi. Total duration = 17 seconds.     Max pressure = 12 hi. Total duration = 17 seconds.    Balloon reinflated a second time: Max pressure = 12 hi. Total duration = 17 seconds.  Balloon reinflated a third time: Max pressure = 12 hi. Total duration = 22 seconds.  Balloon reinflated a fourth time: Max pressure = 12 hi. Total duration = 8 seconds.  Ballo on reinflated a fourth time: Max pressure = 12 hi. Total duration = 11 seconds.

## 2024-05-09 NOTE — Clinical Note
Pediatric Well Adolescent Exam: 15-21 Years of age    Chief Complaint   Patient presents with   • Well Adolescent     14 year        SUBJECTIVE:  Melvin Alfredo is a 14 year old male who presents for a well child exam.  Patient presents accompanied with verbal permission from his mother.      CONCERNS RAISED TODAY: none.  He feels that his ADHD is well controlled.  Previously just had a ADHD evaluation with neuropsychology.  He reports his 5 mg of his medication is working just fine.  His grades are straight days.  He recently won state in a snowboarding competition.  He also shoots trap.  He is doing well.  Denies any chest pain, palpitations, shortness of breath, , leg swelling, changes in his penis or testicles, difficulty urinating, lumps or bumps, rashes, change in moles.  He and his family are doing well.  He says that his maternal grandmother was just diagnosed with metastatic ovarian cancer.  His family is handling this well right now.  He is close with his grandparents.     He denies any sexual activity, drug alcohol or tobacco use.  He wears seatbelt.  Wears his seatbelt..  Does not drive with anyone that is high or intoxicated.     VISION SCREENING:    Hearing Screening    125Hz 250Hz 500Hz 1000Hz 2000Hz 3000Hz 4000Hz 6000Hz 8000Hz   Right ear:            Left ear:               Visual Acuity Screening    Right eye Left eye Both eyes   Without correction: 20/20 20/20 20/20   With correction:          OBJECTIVE:  PAST HISTORIES:  Allergies, Medications, Medical history, Surgical history, Family history reviewed and updated.  Toxic Exposure:   Tobacco Use: Never           RECENT HEALTH EVENTS:  · Illnesses: []  YES    [x]  NO    []  N/A  · Hospitalizations: []  YES    [x]  NO    []  N/A  · Injuries or Accidents: []  YES    [x]  NO    []  N/A    REVIEW OF SYSTEMS:    All systems reviewed and negative except as documented in \"Concerns raised\".    PHYSICAL EXAM:   VITAL SIGNS:   Visit Vitals  /78  Catheter removed over wire.   Pulse 97   Ht 5' 7.72\" (1.72 m)   Wt 73.1 kg   SpO2 99%   BMI 24.70 kg/m²    83 %ile (Z= 0.97) based on CDC (Boys, 2-20 Years) weight-for-age data using vitals from 2/19/2021.  GENERAL:  Well appearing male child.  Alert and active.  SKIN:  Warm, normal turgor. No cyanosis. No rash.  HEAD:  Normocephalic, atraumatic.    EYES:  Conjunctivae appear normal, noninjected, nonicteric, positive red reflex.  NOSE:  Appears normal without drainage.  EARS:  Normal external auditory canals. Tympanic membranes are transparent with normal landmarks. Scarring present - hx of tubes  NECK:  Supple, no lymphadenopathy or masses.  HEART:  Regular rate and rhythm.  Normal S1, S2.  No murmurs, rubs, gallops. occ ectopic beat.   LUNGS:  Clear to auscultation. No wheezes, rales, rhonchi.  Normal work effort with breathing.  ABDOMEN:  Bowel sounds present. Soft, nontender. No hepatomegaly, splenomegaly or masses.  GENITOURINARY:  Normal penis and testicles.  No lesions felt.  Medical assistant present.  EXTREMITIES: Symmetrical muscle mass, strength all extremities.  No effusions.   BACK: Spine straight. No costovertebral angle tenderness.      NEUROLOGIC:  Oriented x4. No gross lateralizing signs or focal deficits.  Normal gait.      Assessment:   Well 14 year old male adolescent.    Plan:  1. All parental concerns and questions discussed.  2. Anticipatory guidance provided, handouts given   3. Immunizations per orders.  Risks, benefits, and side effects discussed.  4. Orders for immunizations given today per the recommended schedule.  5. VIS for Immunizations given.    Follow up:Return in about 3 months (around 5/19/2021) for follow up.

## 2024-05-09 NOTE — PROGRESS NOTES
Care Suites Admission Nursing Note    Patient Information  Name: Tony Bruce  Age: 83 year old  Reason for admission: left heart angiogram with possible PCI  Care Suites arrival time: 0700    Visitor Information  Name: Christina Barrett [phone]      Patient Admission/Assessment   Pre-procedure assessment complete: Yes  If abnormal assessment/labs, provider notified: No  NPO: Yes  Medications held per instructions/orders: Yes  Consent: obtained  If applicable, pregnancy test status: declined  Patient oriented to room: Yes  Education/questions answered: Yes  Plan/other: Review labs, obtain consent and proceed with scheduled treatment or intervention      Discharge Planning  Discharge name/phone number: Christina castañeda - [phone]   Overnight post sedation caregiver: Christina Barrett [phone]   Discharge location: [place]    Con Bedoya RN

## 2024-05-09 NOTE — Clinical Note
The first balloon was inserted into the left anterior descending.Max pressure = 12 hi. Total duration = 6 seconds.      Balloon reinflated a second time:

## 2024-05-09 NOTE — Clinical Note
Atherectomy performed in the left anterior descending. Rotational atherectomy was performed. Pass rate = 160 RPM. Pass duration = 9 seconds.

## 2024-05-09 NOTE — Clinical Note
The first balloon was inserted into the left anterior descending.Max pressure = 18 hi. Total duration = 11 seconds.     Max pressure = 20 hi. Total duration = 11 seconds.    Balloon reinflated a second time: Max pressure = 20 hi. Total duration = 11 seconds.

## 2024-05-09 NOTE — PROGRESS NOTES
Fem stop off - approx 1545  Site bruised but soft with no bleeding   Pt is much LESS discomfort since having fem stop off   VSS  TR band remains on - with approx 6 ml left   Pt transferred to  w/o incident   Both sites checked with [facility] RN  Wife and daughter at bedside

## 2024-05-09 NOTE — Clinical Note
Max pressure = 18 hi. Total duration = 12 seconds.     Max pressure = 20 hi. Total duration = 10 seconds.    Balloon reinflated a second time: Max pressure = 20 hi. Total duration = 10 seconds.  Balloon reinflated a third time: Max pressure = 20 hi. Total duration = 5 seconds.  Balloon reinflated a fourth time: Max pressure = 20 hi. Total duration = 6 seconds.  Balloon reinflated a fourth time: Max pressure = 20 hi. Total duration  = 6 seconds.

## 2024-05-10 VITALS
SYSTOLIC BLOOD PRESSURE: 124 MMHG | HEIGHT: 68 IN | WEIGHT: 238 LBS | RESPIRATION RATE: 18 BRPM | TEMPERATURE: 98.2 F | OXYGEN SATURATION: 93 % | DIASTOLIC BLOOD PRESSURE: 48 MMHG | BODY MASS INDEX: 36.07 KG/M2 | HEART RATE: 64 BPM

## 2024-05-10 LAB
ANION GAP SERPL CALCULATED.3IONS-SCNC: 8 MMOL/L (ref 7–15)
ATRIAL RATE - MUSE: 357 BPM
ATRIAL RATE - MUSE: 60 BPM
ATRIAL RATE - MUSE: 69 BPM
BUN SERPL-MCNC: 11.2 MG/DL (ref 8–23)
CALCIUM SERPL-MCNC: 8.3 MG/DL (ref 8.8–10.2)
CHLORIDE SERPL-SCNC: 106 MMOL/L (ref 98–107)
CREAT SERPL-MCNC: 0.89 MG/DL (ref 0.67–1.17)
DEPRECATED HCO3 PLAS-SCNC: 28 MMOL/L (ref 22–29)
DIASTOLIC BLOOD PRESSURE - MUSE: NORMAL MMHG
EGFRCR SERPLBLD CKD-EPI 2021: 84 ML/MIN/1.73M2
GLUCOSE SERPL-MCNC: 100 MG/DL (ref 70–99)
INTERPRETATION ECG - MUSE: NORMAL
P AXIS - MUSE: 46 DEGREES
P AXIS - MUSE: 80 DEGREES
P AXIS - MUSE: NORMAL DEGREES
POTASSIUM SERPL-SCNC: 4.3 MMOL/L (ref 3.4–5.3)
PR INTERVAL - MUSE: 188 MS
PR INTERVAL - MUSE: 228 MS
PR INTERVAL - MUSE: 250 MS
QRS DURATION - MUSE: 146 MS
QRS DURATION - MUSE: 196 MS
QRS DURATION - MUSE: 98 MS
QT - MUSE: 396 MS
QT - MUSE: 452 MS
QT - MUSE: 514 MS
QTC - MUSE: 424 MS
QTC - MUSE: 452 MS
QTC - MUSE: 600 MS
R AXIS - MUSE: -67 DEGREES
R AXIS - MUSE: -70 DEGREES
R AXIS - MUSE: 45 DEGREES
SODIUM SERPL-SCNC: 142 MMOL/L (ref 135–145)
SYSTOLIC BLOOD PRESSURE - MUSE: NORMAL MMHG
T AXIS - MUSE: -7 DEGREES
T AXIS - MUSE: 87 DEGREES
T AXIS - MUSE: 90 DEGREES
VENTRICULAR RATE- MUSE: 60 BPM
VENTRICULAR RATE- MUSE: 69 BPM
VENTRICULAR RATE- MUSE: 82 BPM

## 2024-05-10 PROCEDURE — 99214 OFFICE O/P EST MOD 30 MIN: CPT | Mod: FS | Performed by: INTERNAL MEDICINE

## 2024-05-10 PROCEDURE — 93005 ELECTROCARDIOGRAM TRACING: CPT

## 2024-05-10 PROCEDURE — 36415 COLL VENOUS BLD VENIPUNCTURE: CPT | Performed by: HOSPITALIST

## 2024-05-10 PROCEDURE — 93010 ELECTROCARDIOGRAM REPORT: CPT | Performed by: INTERNAL MEDICINE

## 2024-05-10 PROCEDURE — 80048 BASIC METABOLIC PNL TOTAL CA: CPT | Performed by: HOSPITALIST

## 2024-05-10 RX ORDER — NALOXONE HYDROCHLORIDE 0.4 MG/ML
0.2 INJECTION, SOLUTION INTRAMUSCULAR; INTRAVENOUS; SUBCUTANEOUS
Status: DISCONTINUED | OUTPATIENT
Start: 2024-05-10 | End: 2024-05-10 | Stop reason: HOSPADM

## 2024-05-10 RX ORDER — NALOXONE HYDROCHLORIDE 0.4 MG/ML
0.4 INJECTION, SOLUTION INTRAMUSCULAR; INTRAVENOUS; SUBCUTANEOUS
Status: DISCONTINUED | OUTPATIENT
Start: 2024-05-10 | End: 2024-05-10 | Stop reason: HOSPADM

## 2024-05-10 RX ORDER — ASPIRIN 81 MG/1
81 TABLET ORAL DAILY
Qty: 30 TABLET | Refills: 0 | Status: SHIPPED | OUTPATIENT
Start: 2024-05-10 | End: 2024-05-15

## 2024-05-10 ASSESSMENT — ACTIVITIES OF DAILY LIVING (ADL)
ADLS_ACUITY_SCORE: 44
ADLS_ACUITY_SCORE: 43
ADLS_ACUITY_SCORE: 48
ADLS_ACUITY_SCORE: 43

## 2024-05-10 NOTE — PROVIDER NOTIFICATION
MD Notification    Notified Person: MD    Notified Person Name: Elton Smith    Notification Date/Time: 5/9/2024 21:35    Notification Interaction: [facility] messaging      Purpose of Notification:  Pt came in through care suites today. High risk PCI to Lmain/LAD. Admitted to CCU d/t complications with manta device and needing a femstop. Cards placed a Full Code order. Admitting hospitalist changed order to DNR/DNI. Patients are typically full code 24 hours post intervention, correct?    Orders Received: Full code for 24 hours, then pt can be changed to DNR/DNI    Comments:

## 2024-05-10 NOTE — PLAN OF CARE
"A&O x 4. Patient denies pain since coming off bedrest. VSS, on RA. Up with Ax1 w/ gb to bathroom. Tele: A Paced with intermittent AV pacing. R groin site ecchymotic without hematoma, CMS intact. Site has been stable arriving to unit at 1630. R radial site, ecchymotic without hematoma, ,CMS intact. Bedrest completed at 2000.  Pt declined scheduled medications this evening because he \"already took his atorovastatin and [facility].\"  Chayito removed at 2230.  Plan for possible discharge tomorrow. Continue to Monitor.         "

## 2024-05-10 NOTE — PLAN OF CARE
A&OX4, VSS, 2 L oxymask applied, Tele- 100% A-Paced, occasional AV Paced. Right radial and right groin bruised, CMS intact. Up with assist of 1 with walker and belt, on cardiac diet.

## 2024-05-10 NOTE — UTILIZATION REVIEW
"Admission Status; Secondary Review Determination     Under the authority of the Utilization Management Committee, the utilization review process indicated a secondary review on the above patient.  The review outcome is based on review of the medical records, discussions with staff, and applying clinical experience noted on the date of the review.       (x) Observation Status Appropriate - This patient does not meet hospital inpatient criteria and is placed in observation status. If this patient's primary payer is Medicare and was admitted as an inpatient, Condition Code 44 should be used and patient status changed to \"observation\".     RATIONALE FOR DETERMINATION:  83-year-old male with history of significant CAD with prior stent placements who underwent a staged complex PCI of the left main into the mid LAD with Impella support and IVUS guidance as well as rotational atherectomy and placement of single stent.  The Impella was removed at the conclusion of the procedure and a Manta closure device was placed.  Unfortunately patient had issues with bleeding following procedure requiring administration of IV protamine and a FemoStop.  Patient was admitted to the hospital to ensure adequate cessation of bleeding.  Observation care appropriate for this management.        The severity of illness, intensity of service provided, expected LOS and risk for adverse outcome make the care appropriate for further observation; however, doesn't meet criteria for hospital inpatient admission. This was discussed with attending physician who concurred with this determination.    The information on this document is developed by the utilization review team in order for the business office to ensure compliance.  This only denotes the appropriateness of proper admission status and does not reflect the quality of care rendered.         The definitions of Inpatient Status and Observation Status used in making the determination above are " those provided in the CMS Coverage Manual, Chapter 1 and Chapter 6, section 70.4.      Sincerely,     Siva Kunz MD    Physician Advisor  Utilization Review/ Case Management  [facility].

## 2024-05-10 NOTE — PROGRESS NOTES
[facility]    Cardiology Progress Note    Primary Cardiologist: Dr. Allred    Date of Admission: 5/9/2024  Service Date: 05/10/24    Summary:  Mr. Tony Bruce is a very pleasant 83 year old male with a past medical history of labile hypertension, hypercholesterolemia, central obesity, inferior wall myocardial infarction with stenting of his right coronary artery (2001), stenting of left anterior descending artery because of unstable angina (2005) with rescue angioplasty of his first diagonal, sick sinus syndrome s/p permanent pacemaker placed in [place] (2022), paroxysmal atrial fibrillation, and untreated obstructive sleep apnea who was admitted on 5/9/2024 for observation following his coronary angiogram due to large bore access site and postprocedural access site bleeding. Cardiology was consulted for assessment and discharge.    Hospital medicine team was consulted, however signed off yesterday evening.    Interval History   Yesterday patient underwent staged complex PCI of the left main into the mid LAD with Impella support and IVUS guidance as well as rotational atherectomy and placement of single stent.  The Impella was removed at the conclusion of the procedure and a Manta closure device was placed.  Unfortunately patient had issues with bleeding following procedure requiring administration of IV protamine and a FemoStop.  With no acute events overnight.  Bleeding of the right groin resolved following administration of protamine yesterday.    Assessment right radial access site, bruise, however no pain or bleeding, positive CMS.  Right femoral access site with dark purple bruising, mild tenderness, no bruit, and positive pedal pulses.    BMP this morning stable. CBC from yesterday morning with hemoglobin 13.2 and platelet 163.     Telemetry: AV paced, rate 70s    Assessment & Plan   1.  Coronary artery disease, s/p complex PCI of left anterior descending artery  -5/24 coronary  angiogram showing no residual stenosis postintervention on the left main approximately  LAD, 50% first marginal branch lesion, third RPL filled by collaterals from the distal circumflex and the second septal branch  -Denies any anginal symptoms     2.  Paroxysmal atrial fibrillation  -Anticoagulated with edoxaban 60mg daily     3. Hypertension, controlled  - PTA losartan 50mg daily, proproanolol 120mg daily, furosemide 20mg daily, and spironolactone 25mg daily     4. Hyperlipidemia  - PTA atorvastatin 80mg daily     5.  Obstructive sleep apnea, noncompliant with CPAP      Plan:   1.  Outpatient cardiac rehab ordered  2.  Triple therapy with Edoxaban starting today, Plavix 75 mg daily, and aspirin 81 mg daily for 7 days  3.  Discontinue aspirin 81 mg daily after 7 days with the last dose on 5/16/2024 and continue with edoxaban and Plavix 75 mg daily afterward  4.  Continue remainder of cardiac regimen including spironolactone, Lasix, propranolol, losartan, and atorvastatin  5.  Okay to discharge from cardiology standpoint  6.  Patient has close follow-up with Dr. Allred on 5/15/2024    Thank you for the opportunity to participate in this pleasant patient's care.     TYSON Manrique, CNP   Nurse Practitioner  [facility]  Pager: [phone]  (8am - 5pm, M-F)    Patient Active Problem List   Diagnosis    NONSPECIFIC MEDICAL HISTORY    Essential hypertension, benign    Hypertrophy of prostate without urinary obstruction    Impotence of organic origin    Pure hypercholesterolemia    Generalized osteoarthrosis, unspecified site    NSTEMI (non-ST elevated myocardial infarction) (H)    Class 2 obesity due to excess calories without serious comorbidity with body mass index (BMI) of 35.0 to 35.9 in adult    Coronary artery disease involving native coronary artery of native heart without angina pectoris    LIZANRDO (obstructive sleep apnea)    Hyperkalemia    Essential tremor    Peripheral neuropathy     Chronic renal failure, stage 3a (H)    ÁLVAREZ (dyspnea on exertion)    Sick sinus syndrome (H)    Cardiac pacemaker in situ    PAF (paroxysmal atrial fibrillation) (H)    Status post coronary angiogram    Coronary artery disease of native artery of native heart with stable angina pectoris (H24)       Physical Exam   Temp: 97.8  F (36.6  C) Temp src: Axillary BP: 124/48 Pulse: 64   Resp: 18 SpO2: 96 % O2 Device: None (Room air) Oxygen Delivery: 2 LPM  Vitals:    05/09/24 0709 05/10/24 0500   Weight: 109.9 kg (242 lb 3.2 oz) 108 kg (238 lb)     Vital Signs with Ranges  Temp:  [97.5  F (36.4  C)-98.6  F (37  C)] 97.8  F (36.6  C)  Pulse:  [59-69] 64  Resp:  [7-26] 18  BP: (103-185)/() 124/48  Cuff Mean (mmHg):  [90] 90  SpO2:  [88 %-100 %] 96 %  I/O last 3 completed shifts:  In: 290 [P.O.:290]  Out: 1450 [Urine:1450]    Constitutional:  Appears his stated age, well nourished, and in no acute distress.  Eyes: Pupils equal, round. Sclerae anicteric.   HEENT: Normocephalic, atraumatic.   Neck: Supple. No JVD appreciated.  Respiratory: Breathing non-labored. Lungs clear to auscultation bilaterally. No crackles, wheezes, rhonchi, or rales.  Cardiovascular: Regular rate and rhythm, normal S1 and S2. No murmur, rub, or gallop.  GI: Soft, non-distended, non-tender, bowel sounds present in all four quadrants.  Skin: Warm, dry. Right radial access site with +CMS, right femoral access site with purple bruising, mild tenderness, +pedal pulses, and no bruit   Musculoskeletal/Extremities: Moves all extremities well and symmetrically. No edema.  Neurologic: No gross focal deficits. Alert, awake, and oriented to person, place and time.  Psychiatric: Affect appropriate. Mentation normal.    Medications   Current Facility-Administered Medications   Medication Dose Route Frequency Provider Last Rate Last Admin    Continuing beta blocker from home medication list OR beta blocker order already placed during this visit   Does not apply  DOES NOT GO TO Samuel Dudley MD        Continuing statin from home medication list OR statin order already placed during this visit   Does not apply DOES NOT GO TO Samuel Dudley MD        Percutaneous Coronary Intervention orders placed (this is information for BPA alerting)   Does not apply DOES NOT GO TO Samuel Dudley MD         Current Facility-Administered Medications   Medication Dose Route Frequency Provider Last Rate Last Admin    aspirin EC tablet 81 mg  81 mg Oral Daily Con Blood MD        atorvastatin (LIPITOR) tablet 80 mg  80 mg Oral QPM Con Blood MD        clopidogrel (PLAVIX) tablet 75 mg  75 mg Oral Daily Samuel Beltran MD        cyanocobalamin (VITAMIN B-12) tablet 1,000 mcg  1,000 mcg Oral Daily Con Blood MD        edoxaban ANTICOAGULANT (SAVAYSA) tablet 60 mg  60 mg Oral Daily Con Blood MD        finasteride (PROSCAR) tablet 5 mg  5 mg Oral Daily Con Blood MD        furosemide (LASIX) tablet 20 mg  20 mg Oral Daily Con Blood MD        losartan (COZAAR) tablet 50 mg  50 mg Oral Daily Con Blood MD        pantoprazole (PROTONIX) EC tablet 40 mg  40 mg Oral Daily Con Blood MD        propranolol ER (INDERAL LA) 24 hr capsule 120 mg  120 mg Oral Daily Con Blood MD        psyllium (METAMUCIL/KONSYL) Packet 1 packet  1 packet Oral Daily Con Blood MD        spironolactone (ALDACTONE) tablet 25 mg  25 mg Oral Daily Con Blood MD        tamsulosin (FLOMAX) capsule 0.4 mg  0.4 mg Oral Daily Con Blood MD           Data   Recent Results (from the past 24 hour(s))   Cardiac Catheterization    Narrative    Successful complex PCI of the LM into the mid-LAD with Impella CP support,   HD-IVUS guidance, rotational atherectomy, and placement of a single 3.0 x   38 mm Synergy GER, post-dilated up to 4.0 mm in the LM.  Impella CP  was removed from the RFA at the conclusion of the procedure   with a Manta closure device.         Recent Labs   Lab 05/09/24  0737   WBC 4.5   HGB 13.2*   HCT 39.7*   MCV 98        Recent Labs   Lab 05/10/24  0545 05/09/24  0737    142   POTASSIUM 4.3 4.3   CHLORIDE 106 105   CO2 28 30*   ANIONGAP 8 7   * 98   BUN 11.2 14.2   CR 0.89 0.98   GFRESTIMATED 84 76   EFREM 8.3* 8.9        This note was completed in part using Dragon voice recognition software. Although reviewed after completion, some grammatical errors may occur.

## 2024-05-10 NOTE — PLAN OF CARE
Tony is alert, oriented, pleasant. VSS on room air. Denies pain, nausea, dyspnea. Right groin site is bruised, tender to palpation. Distal CMS intact; pulses confirmed with Doppler. Right wrist site is bruised and soft. CMS intact. Discharging to home with triple anti-coagulation; reinforced direction to stop 81mg ASA after one week. He will follow up with cardiac rehab. Please note, pt declined taking meds provided by hospital and instead took his own.

## 2024-05-10 NOTE — PROVIDER NOTIFICATION
MD Notification    Notified Person: MD    Notified Person Name: Kale Bender    Notification Date/Time: 5/9/2024 19:36    Notification Interaction: Vocera Message    Purpose of Notification: Clarifying the new code status. Cards had him as full code today because he was a high risk PCI to the L main/LAD. Typically they are a full code for 24 hours post procedure.     Repaged at 21:16 Are you able to update the code status?     Orders Received: Contact cardiology about code status change.    Comments:

## 2024-05-12 ENCOUNTER — PATIENT OUTREACH (OUTPATIENT)
Dept: CARE COORDINATION | Facility: CLINIC | Age: 84
End: 2024-05-12
Payer: OTHER GOVERNMENT

## 2024-05-12 NOTE — PROGRESS NOTES
[facility]: [facility]: Post-Discharge Note  SITUATION                                                      Admission:    Admission Date: 05/09/24   Reason for Admission: Progressive angina, known complex CAD  Discharge:   Discharge Date: 05/10/24  Discharge Diagnosis: ÁLVAREZ (dyspnea on exertion)    Status post coronary angiogram    Coronary artery disease of native artery of native heart with stable angina pectoris (H24)    BACKGROUND                                                      Per hospital discharge summary and inpatient provider notes:    History is obtained from the ED provider, patient and EMR.  Pt is considered a reliable historian     Met the patient for the first time.  I also met family briefly today with him in the room.  He states he feels fine postprocedure.  I am unclear if he understands fully but does know that he had a stent.  Patient does not endorse any current chest pain pressure.  He has no current ÁLVAREZ but he has not been walking because he has had to be on bedrest due to his postprocedural bleeding.  Patient does not have any abdominal pain.  No new sx offered     -PMH briefly reviewed-Regarding LIZANDRO he states he does not think he really had it after his study and never actually took CPAP.  However noting his multiple comorbidities, I recommend he consider a new sleep study and consider an on CPAP treatment option if he indeed has it.  [facility]     -I cannot confirm any of his past history is as it is not available via chart review.     -I was asked to update his CODE STATUS, discussed with the patient that he is DNR/DNI and relatively clearly about that.  That was entered.      ASSESSMENT           Discharge Assessment  How are you doing now that you are home?: Patient states he is alright. Shoulder pain is bothering him, states he had this for years. Gets cortisone shots every 3 months.  How are your  symptoms? (Red Flag symptoms escalate to triage hotline per guidelines): Unchanged  Do you feel your condition is stable enough to be safe at home until your provider visit?: Yes  Does the patient have their discharge instructions? : Yes  Does the patient have questions regarding their discharge instructions? : No  Were you started on any new medications or were there changes to any of your previous medications? : Yes  Does the patient have all of their medications?: Yes  Do you have questions regarding any of your medications? : No  Do you have all of your needed medical supplies or equipment (DME)?  (i.e. oxygen tank, CPAP, cane, etc.): Yes  Discharge follow-up appointment scheduled within 14 calendar days? : Yes  Discharge Follow Up Appointment Date: 05/15/24  Discharge Follow Up Appointment Scheduled with?: Specialty Care Provider         Post-op (Clinicians Only)  Did the patient have surgery or a procedure: Yes  Incision:  (Patient states he can't see it.)  Drainage: No  Bleeding: none  Fever: No  Chills: No  Redness:  (Patient states he can't see it.)  Warmth:  (Patient states he can't see it.)  Swelling:  (Patient states he can't see it.)  Incision site pain: No  Eating & Drinking: eating and drinking without complaints/concerns  PO Intake: regular diet  Additional Symptoms:  (Denies)  Bowel Function: normal  Date of last BM: 05/11/24  Urinary Status: voiding without complaint/concerns    Patient declined taking bandage off the site and only wants Dr. Allred to take it off.     PLAN                                                      Outpatient Plan:  Patient has close follow-up with Dr. Allred on 5/15/2024    Future Appointments   Date Time Provider Department Center   5/15/2024  9:00 AM Nabeel Allred MD [facility]   5/28/2024 10:00 AM [facility] CARDIAC REHAB 4 [facility]   5/29/2024 11:40 AM SHXR3 [facility]   5/29/2024 12:45 PM SHMRP1 SHMR2 [facility]   8/22/2024 12:00 AM  [facility]         For any urgent concerns, please contact our 24 hour nurse triage line: [phone])         Luisa Le RN

## 2024-05-13 ENCOUNTER — TELEPHONE (OUTPATIENT)
Dept: MEDSURG UNIT | Facility: CLINIC | Age: 84
End: 2024-05-13
Payer: OTHER GOVERNMENT

## 2024-05-15 ENCOUNTER — TELEPHONE (OUTPATIENT)
Dept: CARDIOLOGY | Facility: CLINIC | Age: 84
End: 2024-05-15

## 2024-05-15 ENCOUNTER — OFFICE VISIT (OUTPATIENT)
Dept: CARDIOLOGY | Facility: CLINIC | Age: 84
End: 2024-05-15
Attending: INTERNAL MEDICINE
Payer: COMMERCIAL

## 2024-05-15 VITALS
OXYGEN SATURATION: 96 % | HEART RATE: 65 BPM | SYSTOLIC BLOOD PRESSURE: 130 MMHG | BODY MASS INDEX: 36.39 KG/M2 | DIASTOLIC BLOOD PRESSURE: 68 MMHG | HEIGHT: 68 IN | WEIGHT: 240.1 LBS

## 2024-05-15 DIAGNOSIS — E66.09 CLASS 2 OBESITY DUE TO EXCESS CALORIES WITHOUT SERIOUS COMORBIDITY WITH BODY MASS INDEX (BMI) OF 35.0 TO 35.9 IN ADULT: ICD-10-CM

## 2024-05-15 DIAGNOSIS — Z95.0 CARDIAC PACEMAKER IN SITU: ICD-10-CM

## 2024-05-15 DIAGNOSIS — N18.31 CHRONIC RENAL FAILURE, STAGE 3A (H): ICD-10-CM

## 2024-05-15 DIAGNOSIS — I25.118 CORONARY ARTERY DISEASE OF NATIVE ARTERY OF NATIVE HEART WITH STABLE ANGINA PECTORIS (H): ICD-10-CM

## 2024-05-15 DIAGNOSIS — I48.0 PAF (PAROXYSMAL ATRIAL FIBRILLATION) (H): ICD-10-CM

## 2024-05-15 DIAGNOSIS — G47.33 OSA (OBSTRUCTIVE SLEEP APNEA): Chronic | ICD-10-CM

## 2024-05-15 DIAGNOSIS — I49.5 SICK SINUS SYNDROME (H): ICD-10-CM

## 2024-05-15 DIAGNOSIS — R06.09 DOE (DYSPNEA ON EXERTION): ICD-10-CM

## 2024-05-15 DIAGNOSIS — E66.812 CLASS 2 OBESITY DUE TO EXCESS CALORIES WITHOUT SERIOUS COMORBIDITY WITH BODY MASS INDEX (BMI) OF 35.0 TO 35.9 IN ADULT: ICD-10-CM

## 2024-05-15 DIAGNOSIS — E78.00 PURE HYPERCHOLESTEROLEMIA: Chronic | ICD-10-CM

## 2024-05-15 DIAGNOSIS — I10 ESSENTIAL HYPERTENSION, BENIGN: ICD-10-CM

## 2024-05-15 PROCEDURE — G2211 COMPLEX E/M VISIT ADD ON: HCPCS | Performed by: INTERNAL MEDICINE

## 2024-05-15 PROCEDURE — 99215 OFFICE O/P EST HI 40 MIN: CPT | Performed by: INTERNAL MEDICINE

## 2024-05-15 RX ORDER — NITROGLYCERIN 0.4 MG/1
TABLET SUBLINGUAL
Qty: 25 TABLET | Refills: 3 | Status: SHIPPED | OUTPATIENT
Start: 2024-05-15

## 2024-05-15 NOTE — TELEPHONE ENCOUNTER
Message from Dr. Allred:  Nabeel Allred MD  P [facility] Heart Team 2  I would recommend Tony participate in cardiac rehab.  He should be eligible given his recent intervention.  Please look into this.  I did not talk to him about it during the visit but thought about it afterwards    1040 spoke with patient's spouse, they are already set up at [facility] for cardiac rehab. This starts on 5/28/2024.

## 2024-05-15 NOTE — PROGRESS NOTES
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    Sick sinus syndrome and a pacemaker placed in [place] 2022.  Has identified paroxysmal atrial fibrillation [facility] which [facility] to edoxaban 60 mg daily.  He also has obstructive sleep apnea for which he does not use a CPAP mask.     He had COVID in 03/2020.   Tony thought he had worsening dyspnea on exertion.  He had no chest, arm, neck, jaw or shoulder discomfort.  His activity is mostly limited by debilitating back pain.  [facility] upon return to [place] demonstrated a small reversible defect in the basilar to mid inferior wall.  Ejection fraction was 63% without focal wall motion abnormalities.  BNP was 174.       Echocardiogram  09/2021 demonstrated ejection fraction of 60%-65% without focal wall motion abnormalities, no significant valvular pathology and normal pulmonary pressures.     Because of his abnormal stress test, we took him to the Cath Lab, which demonstrated what appeared to be a chronically occluded right coronary artery filled by left to right collaterals.  He did have a distal left main stenosis in the 25% range.  The proximal LAD appeared to have a stenosis in the 50% -60% range.  The iFR of the left anterior descending artery was 0.85.  In an attempt to perform angioplasty of the proximal ostial LAD, I was unable to advance a balloon and I decided we would set up as a CHIP case as it was heavily calcified.     We added Imdur 15 mg to his regimen and Plavix and started him in Cardiac Rehab.  When he returned for followup, he thought he was feeling significantly  better and did not want to follow up with the CHIP team.     Follow-up earlier this month he complained of severe disabling dyspnea on exertion.  He has no orthopnea or PND.  No peripheral edema.  He also states all of his joints knees, shoulder and back also limits his activity.  He states nobody wants to touch him to help him improve.      I sent him to the Cath Lab where he underwent successful intervention with Impella CP support undergoing rotational atherectomy and IVUS directed stent deployment of his left main and proximal left anterior descending artery.    He returns at this time stating he thinks his dyspnea is somewhat improved but he is only 6 days out.  He did have quite a bit of be bleeding problem and still has his bandage on his right groin.  This was removed demonstrating a large area of ecchymoses but no hematoma and no bruit.     Interrogation of device shows the a paces 76 per time of the time he paces in the ventricle 42% of the time.  He had no atrial or ventricular arrhythmias.  As stated display and his heart rate appears to be predominantly in the 60s and 70s.  Rate response has been adjusted.    Preprocedure echocardiogram demonstrated ejection fraction of 60 to 65% without any valvular pathology.  IVC was described as normal     ASSESSMENT AND PLAN:   Tony has no clear anginal symptoms and his dyspnea appears to be somewhat improved.  As I told him I think his dyspnea on exertion is multifactorial to what ever degree was contributed by the stenosis has now been alleviated and we will see how much she improves.  I told him I think a significant component is age, deconditioning and obesity.  I recommended that he exercise regularly.  I have recommended cardiac rehab.     He is currently on aspirin, Plavix and edoxaban.  I have told him he can stop his aspirin at this time.     As stated above we made adjustments in his device.  I will follow him up in 3 months.  He will follow in our  "device clinic.  I will consider further adjustments to his rate response     Tony's heart failure appears to be well compensated.     I have increased his atorvastatin to 80 mg.  Will recheck a fasting lipid profile down the road aiming for an LDL of less than 55     Blood pressure is well controlled today at 130/68 with a pulse of 65     Weight is 240 pounds,.  Body mass index is 36.5.  Hopefully fixing his coronary anatomy will result in his dyspnea on exertion improved and he will be able to exercise more, lose weight and/or get his orthopedic issues addressed       Thank you for allow me to participate in this patient's care.  Sincerely,                               Nabeel Allred MD [facility]    The longitudinal plan of care for the diagnosis(es)/condition(s) as documented were addressed during this visit. Due to the added complexity in care, I will continue to support Tony in the subsequent management and with ongoing continuity of care.          Today's clinic visit entailed:  Review of the result(s) of each unique test - coronary angiogram, EKG, lab work,  Ordering of each unique test  Prescription drug management  40 minutes spent by me on the date of the encounter doing chart review, history and exam, documentation and further activities per the note  Provider  Link to [facility] Help Grid     The level of medical decision making during this visit was of moderate complexity.      Orders Placed This Encounter   Procedures    Follow-Up with Cardiology       Orders Placed This Encounter   Medications    nitroGLYcerin (NITROSTAT) 0.4 MG sublingual tablet     Sig: One tablet under the tongue every 5 minutes if needed for chest pain. May repeat every 5 minutes for a maximum of 3 doses in 15 minutes\"     Dispense:  25 tablet     Refill:  3       Medications Discontinued During This Encounter   Medication Reason    aspirin 81 MG EC tablet     amoxicillin (AMOXIL) 500 MG capsule     nitroGLYcerin (NITROSTAT) 0.4 MG " sublingual tablet Reorder (No AVS)         Encounter Diagnoses   Name Primary?    Coronary artery disease of native artery of native heart with stable angina pectoris (H24)     PAF (paroxysmal atrial fibrillation) (H)     Essential hypertension, benign     Cardiac pacemaker in situ     Sick sinus syndrome (H)     ÁLVAREZ (dyspnea on exertion)     Class 2 obesity due to excess calories without serious comorbidity with body mass index (BMI) of 35.0 to 35.9 in adult     Chronic renal failure, stage 3a (H)     LIZANDRO (obstructive sleep apnea)     Pure hypercholesterolemia        CURRENT MEDICATIONS:  Current Outpatient Medications   Medication Sig Dispense Refill    acetaminophen 500 MG CAPS 2 tablets twice a day      atorvastatin (LIPITOR) 80 MG tablet Take 1 tablet (80 mg) by mouth daily 90 tablet 4    Calcium Carb-Cholecalciferol (CALCIUM-VITAMIN D3) 250-125 MG-UNIT TABS Take 2 tablets by mouth 2 times daily      clopidogrel (PLAVIX) 75 MG tablet Take 1 tablet (75 mg) by mouth daily 90 tablet 3    cyanocobalamin (VITAMIN B-12) 1000 MCG tablet Take by mouth daily      diclofenac (VOLTAREN) 1 % GEL topical gel Place onto the skin as needed for moderate pain      docusate sodium (COLACE) 100 MG capsule Take 100 mg by mouth 2 times daily as needed for constipation      edoxaban ANTICOAGULANT (SAVAYSA) 60 MG TABS tablet Take 1 tablet (60 mg) by mouth daily 90 tablet 4    Emollient (VANICREAM EX) APPLY THIN LAYER    TWICE A DAY FOR DRY SKIN      finasteride (PROSCAR) 5 MG tablet Take 5 mg by mouth daily      furosemide (LASIX) 20 MG tablet Take 1 tablet (20 mg) by mouth daily 30 tablet 3    hydrocortisone 2.5 % cream Apply topically 2 times daily      lidocaine (LIDODERM) 5 % Patch Place 1 patch onto the skin      losartan (COZAAR) 50 MG tablet Take 1 tablet (50 mg) by mouth daily 90 tablet 4    nitroGLYcerin (NITROSTAT) 0.4 MG sublingual tablet One tablet under the tongue every 5 minutes if needed for chest pain. May repeat  "every 5 minutes for a maximum of 3 doses in 15 minutes\" 25 tablet 3    omega 3 1000 MG CAPS Take by mouth daily      omeprazole (PRILOSEC) 20 MG DR capsule Take 20 mg by mouth daily      polyethylene glycol (MIRALAX/GLYCOLAX) Packet Take 1 packet by mouth as needed for constipation      primidone (MYSOLINE) 250 MG tablet Take 300 mg by mouth 2 times daily      propranolol ER (INDERAL LA) 120 MG 24 hr capsule Take 120 mg by mouth daily      psyllium 0.52 g capsule Take 1 capsule by mouth daily      sodium fluoride dental gel (PREVIDENT) 1.1 % GEL topical gel Apply to affected area At Bedtime      spironolactone (ALDACTONE) 25 MG tablet Take 1 tablet (25 mg) by mouth daily 90 tablet 3    tamsulosin (FLOMAX) 0.4 MG capsule Take 0.4 mg by mouth daily      terbinafine (LAMISIL) 1 % external cream Apply topically 2 times daily      Vitamin D, Cholecalciferol, 1000 units CAPS Take by mouth daily         ALLERGIES     Allergies   Allergen Reactions    Lisinopril     Morphine      confusion       PAST MEDICAL HISTORY:  Past Medical History:   Diagnosis Date    Coronary atherosclerosis of unspecified type of vessel, native or graft 08/2001    cardiac cath 5/2022: unsuccessful attempt to LAD-medical management; cath 2005: GER to LAD; cath 2002: GER to RCA    Essential hypertension, benign     INTERMITTENT HYPERTENSION    Essential tremor     Generalized osteoarthrosis, unspecified site     Hyperlipidaemia     Hypertrophy of prostate without urinary obstruction and other lower urinary tract symptoms (LUTS)     BPH - Dr Pinto    Impotence of organic origin     Dr Pinto - Dr Allred    NONSPECIFIC MEDICAL HISTORY     CERVICAL/LUMBAR RADICULOPATHY    NONSPECIFIC MEDICAL HISTORY     SHOULDER IMPINGEMENT    NSTEMI (non-ST elevated myocardial infarction) (H)     inferior 2001    Obese     Other and unspecified hyperlipidemia     ?    Peripheral neuropathy     Sick sinus syndrome (H)     s/p [employer] pacemaker implanted March 2022 " in [place]       PAST SURGICAL HISTORY:  Past Surgical History:   Procedure Laterality Date    CARDIAC NUC DANISHA STRESS TEST NL  4/09    normal    COLONOSCOPY  6/06    normal - diverticulosis - dr adams    COLONOSCOPY  8/14/2012    Procedure: COLONOSCOPY;  COLONOSCOPY SCREENING/ 6 YEAR FOLLOW UP;  Surgeon: Rey Adams MD;  Location:  GI    CV CORONARY ANGIOGRAM N/A 5/25/2022    Procedure: Coronary Angiogram;  Surgeon: Nabeel Allred MD;  Location:  HEART CARDIAC CATH LAB    CV IMPELLA INSERTION N/A 5/9/2024    Procedure: Impella Insertion;  Surgeon: Con Blood MD;  Location:  HEART CARDIAC CATH LAB    CV IMPELLA REMOVAL N/A 5/9/2024    Procedure: Impella Removal;  Surgeon: Con Blood MD;  Location:  HEART CARDIAC CATH LAB    CV INSTANTANEOUS WAVE-FREE RATIO N/A 5/25/2022    Procedure: Instantaneous Wave-Free Ratio;  Surgeon: Nabeel Allred MD;  Location:  HEART CARDIAC CATH LAB    CV INTRAVASULAR ULTRASOUND N/A 5/9/2024    Procedure: Intravascular Ultrasound;  Surgeon: Con Blood MD;  Location: [facility] CARDIAC CATH LAB    CV LEFT HEART CATH N/A 5/25/2022    Procedure: Left Heart Catheterization;  Surgeon: Nabeel Allred MD;  Location:  HEART CARDIAC CATH LAB    CV LEFT HEART CATH N/A 5/9/2024    Procedure: Left Heart Catheterization;  Surgeon: Con Blood MD;  Location:  HEART CARDIAC CATH LAB    CV PCI N/A 5/25/2022    Procedure: Percutaneous Coronary Intervention;  Surgeon: Nabeel Allred MD;  Location: [facility] CARDIAC CATH LAB    CV PCI ATHERECTOMY ORBITAL N/A 5/9/2024    Procedure: Percutaneous Coronary Intervention - Atherectomy Rotational;  Surgeon: Con Blood MD;  Location: [facility] CARDIAC CATH LAB    CV PCI STENT DRUG ELUTING N/A 5/9/2024    Procedure: Percutaneous Coronary Intervention Stent;  Surgeon: Con Blood MD;  Location:  HEART CARDIAC CATH LAB    SURGICAL HISTORY OF -   1990     "S/P CERVICAL DISCECTOMY x 2    SURGICAL HISTORY OF -   1990    S/P LUMBAR DISCECTOMY x 2    SURGICAL HISTORY OF -   1993    S/P LT CARPAL TUNNEL SURG/SHOULDER IMPINGEMENT    SURGICAL HISTORY OF -   8/01    S/P STENT OF RT CORONARY ARTERY    SURGICAL HISTORY OF -   9/05    Drug eluting stent to LAD    ZZC TOTAL KNEE ARTHROPLASTY  7/04    Knee Replacement, Total - LEFT Dr Regan    ZZHC REPAIR UMBILICAL CHIKA,5+Y/O,REDUC  9/04    dr dominique       FAMILY HISTORY:  Family History   Problem Relation Age of Onset    Respiratory Father         COPD    Cerebrovascular Disease Mother         CVA    C.A.D. Brother         CABG - after CABG x 3 - rheumatic fever as a child    Breast Cancer Sister     Cerebrovascular Disease Sister        SOCIAL HISTORY:  Social History     Socioeconomic History    Marital status:      Spouse name: paola    Number of children: 4    Years of education: 14    Highest education level: None   Tobacco Use    Smoking status: Never    Smokeless tobacco: Never   Substance and Sexual Activity    Alcohol use: No    Drug use: No    Sexual activity: Yes     Partners: Female   Other Topics Concern    Caffeine Concern Yes     Comment: 3-4 cups qd    Exercise Yes     Comment: limited    Seat Belt Yes       Review of Systems:  Skin:  not assessed       Eyes:  not assessed      ENT:  not assessed      Respiratory:  Negative       Cardiovascular:    fatigue;Positive for Has trouble walking long distance  Gastroenterology: not assessed      Genitourinary:  not assessed      Musculoskeletal:  not assessed      Neurologic:  not assessed      Psychiatric:  not assessed      Heme/Lymph/Imm:  not assessed      Endocrine:  not assessed        Physical Exam:  Vitals: /68 (BP Location: Right arm, Patient Position: Sitting, Cuff Size: Adult Large)   Pulse 65   Ht 1.727 m (5' 8\")   Wt 108.9 kg (240 lb 1.6 oz)   SpO2 96%   BMI 36.51 kg/m      Constitutional:  cooperative, alert and oriented, well " developed, well nourished, in no acute distress obese      Skin:  warm and dry to the touch, no apparent skin lesions or masses noted          Head:  normocephalic, no masses or lesions        Eyes:  pupils equal and round, conjunctivae and lids unremarkable, sclera white, no xanthalasma, EOMS intact, no nystagmus        Lymph:      ENT:  no pallor or cyanosis, dentition good        Neck:  carotid pulses are full and equal bilaterally        Respiratory:  normal breath sounds, clear to auscultation, normal A-P diameter, normal symmetry, normal respiratory excursion, no use of accessory muscles         Cardiac: regular rhythm;normal S1 and S2;no S3 or S4       systolic murmur;RUSB;grade 1        pulses full and equal                                   Right groin ecchymoses, no hematoma no bruit, bandage removed    GI:    obese      Extremities and Muscular Skeletal:  no edema;no spinal abnormalities noted;normal muscle strength and tone              Neurological:  no gross motor deficits   Resting tremor    Psych:  affect appropriate, oriented to time, person and place        CC  Nabeel Allred MD  [id number]  [place] [id number]

## 2024-05-15 NOTE — LETTER
5/15/2024    [facility]  [address] [facility] [address]    RE: Tony Bruce       Dear Colleague,     I had the pleasure of seeing Tony Bruce in the [facility].  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    Sick sinus syndrome and a pacemaker placed in [place] 2022.  Has identified paroxysmal atrial [facility] to edoxaban 60 mg daily.  He also has obstructive sleep apnea for which he does not use a CPAP mask.     He had COVID in 03/2020.   Tony thought he had worsening dyspnea on exertion.  He had no chest, arm, neck, jaw or shoulder discomfort.  His activity is mostly limited by debilitating back pain.  A [facility] upon return to [place] demonstrated a small reversible defect in the basilar to mid inferior wall.  Ejection fraction was 63% without focal wall motion abnormalities.  BNP was 174.       Echocardiogram  09/2021 demonstrated ejection fraction of 60%-65% without focal wall motion abnormalities, no significant valvular pathology and normal pulmonary pressures.     Because of his abnormal stress test, we took him to the Cath Lab, which demonstrated what appeared to be a chronically occluded right coronary artery filled by left to right collaterals.  He did have a distal left main stenosis in the 25% range.  The proximal LAD appeared to have a stenosis in the 50% -60% range.  The iFR of the left anterior descending artery was 0.85.  In an attempt to perform angioplasty of the proximal ostial LAD, I was unable to advance a balloon and I decided we  would set up as a CHIP case as it was heavily calcified.     We added Imdur 15 mg to his regimen and Plavix and started him in Cardiac Rehab.  When he returned for followup, he thought he was feeling significantly better and did not want to follow up with the CHIP team.     Follow-up earlier this month he complained of severe disabling dyspnea on exertion.  He has no orthopnea or PND.  No peripheral edema.  He also states all of his joints knees, shoulder and back also limits his activity.  He states nobody wants to touch him to help him improve.      I sent him to the Cath Lab where he underwent successful intervention with Impella CP support undergoing rotational atherectomy and IVUS directed stent deployment of his left main and proximal left anterior descending artery.    He returns at this time stating he thinks his dyspnea is somewhat improved but he is only 6 days out.  He did have quite a bit of be bleeding problem and still has his bandage on his right groin.  This was removed demonstrating a large area of ecchymoses but no hematoma and no bruit.     Interrogation of device shows the a paces 76 per time of the time he paces in the ventricle 42% of the time.  He had no atrial or ventricular arrhythmias.  As stated display and his heart rate appears to be predominantly in the 60s and 70s.  Rate response has been adjusted.    Preprocedure echocardiogram demonstrated ejection fraction of 60 to 65% without any valvular pathology.  IVC was described as normal     ASSESSMENT AND PLAN:   Tony has no clear anginal symptoms and his dyspnea appears to be somewhat improved.  As I told him I think his dyspnea on exertion is multifactorial to what ever degree was contributed by the stenosis has now been alleviated and we will see how much she improves.  I told him I think a significant component is age, deconditioning and obesity.  I recommended that he exercise regularly.  I have recommended cardiac rehab.     He is  "currently on aspirin, Plavix and edoxaban.  I have told him he can stop his aspirin at this time.     As stated above we made adjustments in his device.  I will follow him up in 3 months.  He will follow in our device clinic.  I will consider further adjustments to his rate response     Tony's heart failure appears to be well compensated.     I have increased his atorvastatin to 80 mg.  Will recheck a fasting lipid profile down the road aiming for an LDL of less than 55     Blood pressure is well controlled today at 130/68 with a pulse of 65     Weight is 240 pounds,.  Body mass index is 36.5.  Hopefully fixing his coronary anatomy will result in his dyspnea on exertion improved and he will be able to exercise more, lose weight and/or get his orthopedic issues addressed       Thank you for allow me to participate in this patient's care.  Sincerely,                               Nabeel Allred MD [facility]    The longitudinal plan of care for the diagnosis(es)/condition(s) as documented were addressed during this visit. Due to the added complexity in care, I will continue to support Tony in the subsequent management and with ongoing continuity of care.          Today's clinic visit entailed:  Review of the result(s) of each unique test - coronary angiogram, EKG, lab work,  Ordering of each unique test  Prescription drug management  40 minutes spent by me on the date of the encounter doing chart review, history and exam, documentation and further activities per the note  Provider  Link to [facility] Help Grid     The level of medical decision making during this visit was of moderate complexity.      Orders Placed This Encounter   Procedures    Follow-Up with Cardiology       Orders Placed This Encounter   Medications    nitroGLYcerin (NITROSTAT) 0.4 MG sublingual tablet     Sig: One tablet under the tongue every 5 minutes if needed for chest pain. May repeat every 5 minutes for a maximum of 3 doses in 15 minutes\"     " Dispense:  25 tablet     Refill:  3       Medications Discontinued During This Encounter   Medication Reason    aspirin 81 MG EC tablet     amoxicillin (AMOXIL) 500 MG capsule     nitroGLYcerin (NITROSTAT) 0.4 MG sublingual tablet Reorder (No AVS)         Encounter Diagnoses   Name Primary?    Coronary artery disease of native artery of native heart with stable angina pectoris (H24)     PAF (paroxysmal atrial fibrillation) (H)     Essential hypertension, benign     Cardiac pacemaker in situ     Sick sinus syndrome (H)     ÁLVAREZ (dyspnea on exertion)     Class 2 obesity due to excess calories without serious comorbidity with body mass index (BMI) of 35.0 to 35.9 in adult     Chronic renal failure, stage 3a (H)     LIZANDRO (obstructive sleep apnea)     Pure hypercholesterolemia        CURRENT MEDICATIONS:  Current Outpatient Medications   Medication Sig Dispense Refill    acetaminophen 500 MG CAPS 2 tablets twice a day      atorvastatin (LIPITOR) 80 MG tablet Take 1 tablet (80 mg) by mouth daily 90 tablet 4    Calcium Carb-Cholecalciferol (CALCIUM-VITAMIN D3) 250-125 MG-UNIT TABS Take 2 tablets by mouth 2 times daily      clopidogrel (PLAVIX) 75 MG tablet Take 1 tablet (75 mg) by mouth daily 90 tablet 3    cyanocobalamin (VITAMIN B-12) 1000 MCG tablet Take by mouth daily      diclofenac (VOLTAREN) 1 % GEL topical gel Place onto the skin as needed for moderate pain      docusate sodium (COLACE) 100 MG capsule Take 100 mg by mouth 2 times daily as needed for constipation      edoxaban ANTICOAGULANT (SAVAYSA) 60 MG TABS tablet Take 1 tablet (60 mg) by mouth daily 90 tablet 4    Emollient (VANICREAM EX) APPLY THIN LAYER    TWICE A DAY FOR DRY SKIN      finasteride (PROSCAR) 5 MG tablet Take 5 mg by mouth daily      furosemide (LASIX) 20 MG tablet Take 1 tablet (20 mg) by mouth daily 30 tablet 3    hydrocortisone 2.5 % cream Apply topically 2 times daily      lidocaine (LIDODERM) 5 % Patch Place 1 patch onto the skin       "losartan (COZAAR) 50 MG tablet Take 1 tablet (50 mg) by mouth daily 90 tablet 4    nitroGLYcerin (NITROSTAT) 0.4 MG sublingual tablet One tablet under the tongue every 5 minutes if needed for chest pain. May repeat every 5 minutes for a maximum of 3 doses in 15 minutes\" 25 tablet 3    omega 3 1000 MG CAPS Take by mouth daily      omeprazole (PRILOSEC) 20 MG DR capsule Take 20 mg by mouth daily      polyethylene glycol (MIRALAX/GLYCOLAX) Packet Take 1 packet by mouth as needed for constipation      primidone (MYSOLINE) 250 MG tablet Take 300 mg by mouth 2 times daily      propranolol ER (INDERAL LA) 120 MG 24 hr capsule Take 120 mg by mouth daily      psyllium 0.52 g capsule Take 1 capsule by mouth daily      sodium fluoride dental gel (PREVIDENT) 1.1 % GEL topical gel Apply to affected area At Bedtime      spironolactone (ALDACTONE) 25 MG tablet Take 1 tablet (25 mg) by mouth daily 90 tablet 3    tamsulosin (FLOMAX) 0.4 MG capsule Take 0.4 mg by mouth daily      terbinafine (LAMISIL) 1 % external cream Apply topically 2 times daily      Vitamin D, Cholecalciferol, 1000 units CAPS Take by mouth daily         ALLERGIES     Allergies   Allergen Reactions    Lisinopril     Morphine      confusion       PAST MEDICAL HISTORY:  Past Medical History:   Diagnosis Date    Coronary atherosclerosis of unspecified type of vessel, native or graft 08/2001    cardiac cath 5/2022: unsuccessful attempt to LAD-medical management; cath 2005: GER to LAD; cath 2002: GER to RCA    Essential hypertension, benign     INTERMITTENT HYPERTENSION    Essential tremor     Generalized osteoarthrosis, unspecified site     Hyperlipidaemia     Hypertrophy of prostate without urinary obstruction and other lower urinary tract symptoms (LUTS)     BPH - Dr Pinto    Impotence of organic origin     Dr Pinto - Dr Allred    NONSPECIFIC MEDICAL HISTORY     CERVICAL/LUMBAR RADICULOPATHY    NONSPECIFIC MEDICAL HISTORY     SHOULDER IMPINGEMENT    NSTEMI " (non-ST elevated myocardial infarction) (H)     inferior 2001    Obese     Other and unspecified hyperlipidemia     ?    Peripheral neuropathy     Sick sinus syndrome (H)     s/p [employer] pacemaker implanted March 2022 in [place]       PAST SURGICAL HISTORY:  Past Surgical History:   Procedure Laterality Date    CARDIAC NUC DANISHA STRESS TEST NL  4/09    normal    COLONOSCOPY  6/06    normal - diverticulosis - dr adams    COLONOSCOPY  8/14/2012    Procedure: COLONOSCOPY;  COLONOSCOPY SCREENING/ 6 YEAR FOLLOW UP;  Surgeon: Rey Adams MD;  Location:  GI    CV CORONARY ANGIOGRAM N/A 5/25/2022    Procedure: Coronary Angiogram;  Surgeon: Nabeel Allred MD;  Location:  HEART CARDIAC CATH LAB    CV IMPELLA INSERTION N/A 5/9/2024    Procedure: Impella Insertion;  Surgeon: Con Blood MD;  Location:  HEART CARDIAC CATH LAB    CV IMPELLA REMOVAL N/A 5/9/2024    Procedure: Impella Removal;  Surgeon: Con Blood MD;  Location:  HEART CARDIAC CATH LAB    CV INSTANTANEOUS WAVE-FREE RATIO N/A 5/25/2022    Procedure: Instantaneous Wave-Free Ratio;  Surgeon: Nabeel Allred MD;  Location: [facility] CARDIAC CATH LAB    CV INTRAVASULAR ULTRASOUND N/A 5/9/2024    Procedure: Intravascular Ultrasound;  Surgeon: Con Blood MD;  Location:  HEART CARDIAC CATH LAB    CV LEFT HEART CATH N/A 5/25/2022    Procedure: Left Heart Catheterization;  Surgeon: Nabeel Allred MD;  Location:  HEART CARDIAC CATH LAB    CV LEFT HEART CATH N/A 5/9/2024    Procedure: Left Heart Catheterization;  Surgeon: Con Blood MD;  Location: [facility] CARDIAC CATH LAB    CV PCI N/A 5/25/2022    Procedure: Percutaneous Coronary Intervention;  Surgeon: Nabeel Allred MD;  Location: [facility] CARDIAC CATH LAB    CV PCI ATHERECTOMY ORBITAL N/A 5/9/2024    Procedure: Percutaneous Coronary Intervention - Atherectomy Rotational;  Surgeon: Con Blood MD;  Location: [facility] CARDIAC  CATH LAB    CV PCI STENT DRUG ELUTING N/A 5/9/2024    Procedure: Percutaneous Coronary Intervention Stent;  Surgeon: Con Blood MD;  Location:  HEART CARDIAC CATH LAB    SURGICAL HISTORY OF -   1990    S/P CERVICAL DISCECTOMY x 2    SURGICAL HISTORY OF -   1990    S/P LUMBAR DISCECTOMY x 2    SURGICAL HISTORY OF -   1993    S/P LT CARPAL TUNNEL SURG/SHOULDER IMPINGEMENT    SURGICAL HISTORY OF -   8/01    S/P STENT OF RT CORONARY ARTERY    SURGICAL HISTORY OF -   9/05    Drug eluting stent to LAD    Z[facility] TOTAL KNEE ARTHROPLASTY  7/04    Knee Replacement, Total - LEFT Dr Regan    [facility]REDUC  9/04    dr dominique       FAMILY HISTORY:  Family History   Problem Relation Age of Onset    Respiratory Father         COPD    Cerebrovascular Disease Mother         CVA    C.A.D. Brother         CABG - after CABG x 3 - rheumatic fever as a child    Breast Cancer Sister     Cerebrovascular Disease Sister        SOCIAL HISTORY:  Social History     Socioeconomic History    Marital status:      Spouse name: paola    Number of children: 4    Years of education: 14    Highest education level: None   Tobacco Use    Smoking status: Never    Smokeless tobacco: Never   Substance and Sexual Activity    Alcohol use: No    Drug use: No    Sexual activity: Yes     Partners: Female   Other Topics Concern    Caffeine Concern Yes     Comment: 3-4 cups qd    Exercise Yes     Comment: limited    Seat Belt Yes       Review of Systems:  Skin:  not assessed       Eyes:  not assessed      ENT:  not assessed      Respiratory:  Negative       Cardiovascular:    fatigue;Positive for Has trouble walking long distance  Gastroenterology: not assessed      Genitourinary:  not assessed      Musculoskeletal:  not assessed      Neurologic:  not assessed      Psychiatric:  not assessed      Heme/Lymph/Imm:  not assessed      Endocrine:  not assessed        Physical Exam:  Vitals: /68 (BP Location: Right  "arm, Patient Position: Sitting, Cuff Size: Adult Large)   Pulse 65   Ht 1.727 m (5' 8\")   Wt 108.9 kg (240 lb 1.6 oz)   SpO2 96%   BMI 36.51 kg/m      Constitutional:  cooperative, alert and oriented, well developed, well nourished, in no acute distress obese      Skin:  warm and dry to the touch, no apparent skin lesions or masses noted          Head:  normocephalic, no masses or lesions        Eyes:  pupils equal and round, conjunctivae and lids unremarkable, sclera white, no xanthalasma, EOMS intact, no nystagmus        Lymph:      ENT:  no pallor or cyanosis, dentition good        Neck:  carotid pulses are full and equal bilaterally        Respiratory:  normal breath sounds, clear to auscultation, normal A-P diameter, normal symmetry, normal respiratory excursion, no use of accessory muscles         Cardiac: regular rhythm;normal S1 and S2;no S3 or S4       systolic murmur;RUSB;grade 1        pulses full and equal                                   Right groin ecchymoses, no hematoma no bruit, bandage removed    GI:    obese      Extremities and Muscular Skeletal:  no edema;no spinal abnormalities noted;normal muscle strength and tone              Neurological:  no gross motor deficits   Resting tremor    Psych:  affect appropriate, oriented to time, person and place        CC  Nabeel Allred MD  [address] [address]        Thank you for allowing me to participate in the care of your patient.      Sincerely,     Nabeel Allred MD     [facility]    "

## 2024-05-21 ENCOUNTER — TELEPHONE (OUTPATIENT)
Dept: MEDSURG UNIT | Facility: CLINIC | Age: 84
End: 2024-05-21
Payer: OTHER GOVERNMENT

## 2024-05-29 ENCOUNTER — HOSPITAL ENCOUNTER (OUTPATIENT)
Facility: CLINIC | Age: 84
Discharge: HOME | End: 2024-05-29
Admitting: RADIOLOGY
Payer: COMMERCIAL

## 2024-05-29 ENCOUNTER — HOSPITAL ENCOUNTER (OUTPATIENT)
Dept: GENERAL RADIOLOGY | Facility: CLINIC | Age: 84
Discharge: HOME | End: 2024-05-29
Attending: INTERNAL MEDICINE
Payer: COMMERCIAL

## 2024-05-29 ENCOUNTER — HOSPITAL ENCOUNTER (OUTPATIENT)
Dept: MRI IMAGING | Facility: CLINIC | Age: 84
Discharge: HOME | End: 2024-05-29
Attending: INTERNAL MEDICINE
Payer: COMMERCIAL

## 2024-05-29 VITALS — HEART RATE: 80 BPM | OXYGEN SATURATION: 94 % | RESPIRATION RATE: 16 BRPM

## 2024-05-29 DIAGNOSIS — R20.2 PARESTHESIA OF SKIN: ICD-10-CM

## 2024-05-29 PROCEDURE — 70551 MRI BRAIN STEM W/O DYE: CPT

## 2024-05-29 PROCEDURE — 71046 X-RAY EXAM CHEST 2 VIEWS: CPT

## 2024-05-29 PROCEDURE — 999N000049 HC STATISTIC ECHO STRESS OR NM NPI

## 2024-05-29 ASSESSMENT — ACTIVITIES OF DAILY LIVING (ADL)
ADLS_ACUITY_SCORE: 38

## 2024-05-29 NOTE — DISCHARGE SUMMARY
[facility]    Discharge Summary  Cardiology    Date of Admission:  05/09/2024  Date of Discharge:  05/10/2024  Discharging Provider: Con Blood MD    Discharge Diagnoses   CAD s/p complex LM/LAD PCI  Paroxysmal AF  HTN  HL  LIZANDRO    History of Present Illness   Tony Bruce is an 84 year old male who presented for outpatient complex PCI of the ostial LAD.  Due to complex, calcified lesion, reduced EF, and known RCA , Impella CP support was used for the procedure.  Procedure was successful without significant complication.  Impella CP was removed from the RFA at conclusion of the procedure with Manta device used for closure.  There was some persistent mild oozing/hematoma at the access site, so low-dose protamine was used for heparin reversal, and patient was admitted overnight for observation.    Hospital Course   No acute issues during hospital stay per ROBYN documentation.  The following morning, groin site ecchymosis noted but no further bleeding, no hematoma.  Patient discharged.      Con Blood MD    Significant Results and Procedures   Complex LAD/LM PCI (see procedure note)    Pending Results   None  Unresulted Labs Ordered in the Past 30 Days of this Admission       No orders found from 4/29/2024 to 5/30/2024.            Code Status   DNR / DNI    Primary Care Physician   [facility]        Time Spent on this Encounter   I did not personally see the patient on the date of discharge and will not be billing for the patient's discharge.    Discharge Disposition   Discharged to home  Condition at discharge: Good    Consultations This Hospital Stay   Medicine    Discharge Orders   No discharge procedures on file.  Discharge Medications   Patient's Medications   New Prescriptions    No medications on file   Previous Medications    ACETAMINOPHEN 500 MG CAPS    2 tablets twice a day    ATORVASTATIN (LIPITOR) 80 MG TABLET    Take 1 tablet (80 mg) by mouth  "daily    CALCIUM CARB-CHOLECALCIFEROL (CALCIUM-VITAMIN D3) 250-125 MG-UNIT TABS    Take 2 tablets by mouth 2 times daily    CLOPIDOGREL (PLAVIX) 75 MG TABLET    Take 1 tablet (75 mg) by mouth daily    CYANOCOBALAMIN (VITAMIN B-12) 1000 MCG TABLET    Take by mouth daily    DICLOFENAC (VOLTAREN) 1 % GEL TOPICAL GEL    Place onto the skin as needed for moderate pain    DOCUSATE SODIUM (COLACE) 100 MG CAPSULE    Take 100 mg by mouth 2 times daily as needed for constipation    EDOXABAN ANTICOAGULANT (SAVAYSA) 60 MG TABS TABLET    Take 1 tablet (60 mg) by mouth daily    EMOLLIENT (VANICREAM EX)    APPLY THIN LAYER    TWICE A DAY FOR DRY SKIN    FINASTERIDE (PROSCAR) 5 MG TABLET    Take 5 mg by mouth daily    FUROSEMIDE (LASIX) 20 MG TABLET    Take 1 tablet (20 mg) by mouth daily    HYDROCORTISONE 2.5 % CREAM    Apply topically 2 times daily    LIDOCAINE (LIDODERM) 5 % PATCH    Place 1 patch onto the skin    LOSARTAN (COZAAR) 50 MG TABLET    Take 1 tablet (50 mg) by mouth daily    NITROGLYCERIN (NITROSTAT) 0.4 MG SUBLINGUAL TABLET    One tablet under the tongue every 5 minutes if needed for chest pain. May repeat every 5 minutes for a maximum of 3 doses in 15 minutes\"    OMEGA 3 1000 MG CAPS    Take by mouth daily    OMEPRAZOLE (PRILOSEC) 20 MG DR CAPSULE    Take 20 mg by mouth daily    POLYETHYLENE GLYCOL (MIRALAX/GLYCOLAX) PACKET    Take 1 packet by mouth as needed for constipation    PRIMIDONE (MYSOLINE) 250 MG TABLET    Take 300 mg by mouth 2 times daily    PROPRANOLOL ER (INDERAL LA) 120 MG 24 HR CAPSULE    Take 120 mg by mouth daily    PSYLLIUM 0.52 G CAPSULE    Take 1 capsule by mouth daily    SODIUM FLUORIDE DENTAL GEL (PREVIDENT) 1.1 % GEL TOPICAL GEL    Apply to affected area At Bedtime    SPIRONOLACTONE (ALDACTONE) 25 MG TABLET    Take 1 tablet (25 mg) by mouth daily    TAMSULOSIN (FLOMAX) 0.4 MG CAPSULE    Take 0.4 mg by mouth daily    TERBINAFINE (LAMISIL) 1 % EXTERNAL CREAM    Apply topically 2 times daily    " VITAMIN D, CHOLECALCIFEROL, 1000 UNITS CAPS    Take by mouth daily   Modified Medications    No medications on file   Discontinued Medications    No medications on file     Allergies   Allergies   Allergen Reactions    Lisinopril     Morphine      confusion     Data   Most Recent 3 CBC's:  Recent Labs   Lab Test 05/09/24  0737 05/25/22  0659 04/19/21  0000 09/18/19  0000   WBC 4.5 3.6* 5.01 4.21   HGB 13.2* 12.9* 13.7 12.9*   MCV 98 102*  --  98    161 161 165       Most Recent 3 BMP's:  Recent Labs   Lab Test 05/10/24  0545 05/09/24  0737 04/25/24  1421 05/25/22  0659    142  --  140   POTASSIUM 4.3 4.3  --  4.2   CHLORIDE 106 105 105 109   CO2 28 30*  --  27   BUN 11.2 14.2  --  16   CR 0.89 0.98  --  0.84   ANIONGAP 8 7  --  4   EFREM 8.3* 8.9  --  8.5   * 98  --  105*       Most Recent 2 LFT's:  Recent Labs   Lab Test 06/21/21  0951 04/19/21  0000 10/12/17  0000   AST  --  28 24   ALT 36 28 33   ALKPHOS  --  55 58   BILITOTAL  --  0.4 0.3       Most Recent 3 INR's:  Recent Labs   Lab Test 05/09/24  0737 05/25/22  0659   INR 1.11 1.10       Most Recent 3 Troponin's:  Recent Labs   Lab Test 07/31/19  1332   TROPI <0.015       Most Recent Cholesterol Panel:  Recent Labs   Lab Test 04/25/24  1421 04/25/22  0853 06/21/21  0951   CHOL  --   --  144   LDL  --   --  65   HDL  --   --  60   TRIG 131   < > 93    < > = values in this interval not displayed.       Most Recent Hemoglobin A1c:No lab results found.

## 2024-05-29 NOTE — PROGRESS NOTES
Care Suites Procedure Nursing Note    Patient Information  Name: Tony Bruce  Age: 84 year old    Procedure  Procedure: MRI with pacemaker.  Procedure start time: 1300  Procedure complete time: 1337  Concerns/abnormal assessment: no  If abnormal assessment, provider notified: N/A  Plan/Other: monitor. Pt tolerated MRI well.    Herrera Field RN

## 2024-06-03 ENCOUNTER — TELEPHONE (OUTPATIENT)
Dept: CARDIOLOGY | Facility: CLINIC | Age: 84
End: 2024-06-03
Payer: OTHER GOVERNMENT

## 2024-06-03 NOTE — TELEPHONE ENCOUNTER
Wife called. Wife wondering who is responsible and who should give recommendations? .   [facility]  and [facility].  Wife agrees with plan.     Wife will call [facility] PCP and or neurologist for recommendations.       Patient had a Brain MRI 5-29-24 -   HISTORY: 84 yo M w/ hx of chronic pain, HTN, CAD, SSS, s/p PM, who has  had facial numbness: bilateral. Clinically suggestive of anxiety at  this point. Anginal equivalent appears unlikely. Could be side effect  of primidone. Normal neuro exam; Paresthesia of skin.   MPRESSION: Diffuse cerebral volume loss and cerebral white matter  changes consistent with chronic small vessel ischemic disease. No  evidence for acute intracranial pathology. No evidence for abnormality  along the course of the 5th cranial nerves on either side on these  noncontrast images.    [facility]     Next Dr. Brigida MARTINEZ 8-24-24.

## 2024-07-12 ENCOUNTER — TELEPHONE (OUTPATIENT)
Dept: CARDIOLOGY | Facility: CLINIC | Age: 84
End: 2024-07-12
Payer: OTHER GOVERNMENT

## 2024-07-12 NOTE — TELEPHONE ENCOUNTER
Call from Dr Ben Van Vranken (ph [phone]) at the [facility] to discuss [facility] concerns. He notes the patient has complex CAD with last stenting in May. He saw the patient recently at which time he reported bilateral jaw/ front of neck/maxillary area numbness. It has been intermittent/ongoing for the last 6-9mos, maybe longer. Patient reports that it has gotten worse over time. Episodes last about 20min. He saw neurology and his workup including an MRI was unrevealing. The symptoms are not consistent with trigeminal neuralgia, possibly complex migraine.   Dr Van Vranken notes there may be an exertional component but not always. BP is well controlled at this time.   Dr PEÑALOZA is wondering if this could possible related to angina and if he should have a cardiac MRI. He also wonders if we should prescribe a nitrate to see if this helps, but he notes he may need adjustments in the doses of his other meds and he didn't want to make too many changes before first checking with cardiology. Dr Allred messaged.

## 2024-07-15 RX ORDER — ISOSORBIDE MONONITRATE 30 MG/1
15 TABLET, EXTENDED RELEASE ORAL DAILY
COMMUNITY

## 2024-07-15 NOTE — TELEPHONE ENCOUNTER
Nabeel Allred MD  You3 days ago     He previously was on Imdur. Restart at 15 mgs/day and see if it improves. If it appears to be angina, I would go back to the lab and skip stress testing. It would be hard to sort out noninvasively given his occluded RCA.He is supposed to be seeing me next month.If we think this is unstable send to ER or earlier apt       Spoke to patient and he was agreeable to this plan. He asks that [facility], declines to have it sent locally. His wife asks for the scheduling number to set up his pre-visit labs as well, phone number provided.     Called Dr Van Vranken and reviewed message from Dr Allred. He will fill the script for the imdur since they [facility] it through the [facility]. He states he may also end up decreasing the spironolactone to allow more room in his BP. He/his office will reach out to Tony about this.

## 2024-07-30 ENCOUNTER — TELEPHONE (OUTPATIENT)
Dept: CARDIOLOGY | Facility: CLINIC | Age: 84
End: 2024-07-30
Payer: COMMERCIAL

## 2024-07-30 ENCOUNTER — HOSPITAL ENCOUNTER (EMERGENCY)
Facility: CLINIC | Age: 84
Discharge: HOME | End: 2024-07-30
Attending: EMERGENCY MEDICINE | Admitting: EMERGENCY MEDICINE
Payer: COMMERCIAL

## 2024-07-30 VITALS
OXYGEN SATURATION: 94 % | WEIGHT: 230 LBS | HEART RATE: 64 BPM | BODY MASS INDEX: 34.86 KG/M2 | SYSTOLIC BLOOD PRESSURE: 131 MMHG | HEIGHT: 68 IN | DIASTOLIC BLOOD PRESSURE: 75 MMHG | TEMPERATURE: 97.7 F | RESPIRATION RATE: 16 BRPM

## 2024-07-30 DIAGNOSIS — S51.811A SKIN TEAR OF RIGHT FOREARM WITHOUT COMPLICATION, INITIAL ENCOUNTER: ICD-10-CM

## 2024-07-30 PROCEDURE — 99282 EMERGENCY DEPT VISIT SF MDM: CPT

## 2024-07-30 ASSESSMENT — COLUMBIA-SUICIDE SEVERITY RATING SCALE - C-SSRS
1. IN THE PAST MONTH, HAVE YOU WISHED YOU WERE DEAD OR WISHED YOU COULD GO TO SLEEP AND NOT WAKE UP?: NO
6. HAVE YOU EVER DONE ANYTHING, STARTED TO DO ANYTHING, OR PREPARED TO DO ANYTHING TO END YOUR LIFE?: NO
2. HAVE YOU ACTUALLY HAD ANY THOUGHTS OF KILLING YOURSELF IN THE PAST MONTH?: NO

## 2024-07-30 ASSESSMENT — ACTIVITIES OF DAILY LIVING (ADL): ADLS_ACUITY_SCORE: 36

## 2024-07-30 NOTE — DISCHARGE INSTRUCTIONS
Please keep the current bandage in place for 24 hours.    After 24 hours you can clean the area with soapy water.  Apply bacitracin or triple antimicrobial meant twice daily.  When covering the wound, please place a nonadherent  dressing off and called Tessalon which you can find at your local pharmacy followed by gauze.

## 2024-07-30 NOTE — TELEPHONE ENCOUNTER
Patient restarted Imdur 15 mg per day 7/12/24 for angina. Per Dr Allred - Restart at 15 mgs/day and see if it improves. If it appears to be angina, I would go back to the lab and skip stress testing. It would be hard to sort out noninvasively given his occluded RCA.He is supposed to be seeing me next month.If we think this is unstable send to ER or earlier apt    Patient currently in ED for laceration. Will call on discharge to assess BP/ HR and chest pain symptoms.    1025 spoke with patient's spouse. [facility]  They are struggling with a new plan from Neurology to start a mediation for patient's cheek tremors. It cannot be used with a DOAC so they are contemplating changing to warfarin.  Spouse will call the [facility] to set up his August labs prior to the OV on 8/23/2024.

## 2024-07-30 NOTE — ED TRIAGE NOTES
Patient was sitting in his chair when it broke and broke skin all up the R arm. Bandage remains in place. Bleeding controled CMS intact. No head injury ABCs intact.

## 2024-07-30 NOTE — ED NOTES
Pt discharged with written instructions.  Pt and wife verbalize understanding of cleaning wound in 24 hours and follow up as needed.  No further questions at this time.  Wound was cleaned and dressed per MD recommendations (surgifoam, nonadherent dressing and Kerlix).  Pt was sent home with supplies.  Pt was wheeled to the ED lobby wife will drive home.

## 2024-07-30 NOTE — ED PROVIDER NOTES
"  Emergency Department Note      History of Present Illness     Chief Complaint   Laceration    HPI     Tony Bruce is a 84 year old male on Plavix with a history of stent placement presenting with a laceration to the right forearm that occurred last night. He was sitting in his office chair when one of the legs broke off and the chair tipped over. He hit his right arm on the knobs of the desk drawers on his way down. He bandaged the area after the incident, but presented this morning after the injured area continued to bleed. He did not sustain any head trauma. Sensation and feeling in the right arm and hand are normal.     Independent Historian   Wife as detailed above.    Review of External Notes   None    Past Medical History     Medical History and Problem List   Coronary atherosclerosis   Essential hypertension, benign  ED  Generalized osteoarthrosis, unspecified site  Hyperlipidaemia  Hypertrophy of prostate   Impotence of organic origin  NSTEMI  LIZANDRO  Osteoarthritis   Peripheral neuropathy  Sick sinus syndrome   Tremor     Medications   Atorvastatin   Plavix   Edoxaban   Finasteride   Furosemide   Losartan   Nitroglycerin   Omeprazole   Primidone   Propranolol   Spironolactone   Tamsulosin     Surgical History   Cardiac stress test   Colonoscopy   Coronary angiogram   Impella insertion and removal   Left heart cath   Atherectomy orbital   Discectomy   Stent of coronary artery   TKA  Repair umbilical hernia     Physical Exam     Patient Vitals for the past 24 hrs:   BP Temp Pulse Resp SpO2 Height Weight   07/30/24 0850 139/79 97.7  F (36.5  C) 67 16 94 % 1.727 m (5' 8\") 104.3 kg (230 lb)     Physical Exam    HEENT:    Oropharynx is moist  EYES:    Conjunctiva normal.  NECK:    Supple, no meningismus.   CV:     Regular rate and rhythm     No murmurs, rubs or gallops.       2+ radial pulses bilateral.  PULM:    Clear to auscultation bilateral.       No respiratory distress.      No wheezing, rales or " stridor.  MSK:     Right upper extremity:      No focal bony tenderness      Compartments are soft and compressible  LYMPH:   No cervical lymphadenopathy.  NEURO:   Right upper extremity:      Median, radial and ulnar nerve intact to motor and sensation.  SKIN:    Warm, dry  RUE   2 cm skin tear to lateral elbow   6 cm skin tear to proximal lateral forearm   5 cm skin tear to mid forearm with mild venous bleeding   4 cm skin tear to dorsum of hand  PSYCH:    Mood is good and affect is appropriate.      Right hand, 4 cm   Mid forearm, 5 cm   Proximal forearm, 6 cm   Right lateral elbow, 2 cm     Diagnostics     Lab Results   Labs Ordered and Resulted from Time of ED Arrival to Time of ED Departure - No data to display    Imaging   No orders to display     Independent Interpretation   None    ED Course      Medications Administered   Medications - No data to display    Procedures   Procedures   None    Discussion of Management   None    ED Course   ED Course as of 07/30/24 0939   Tue Jul 30, 2024   0929 I obtained the history and examined the patient as noted above.        Optional/Additional Documentation  None    Medical Decision Making / Diagnosis     CMS Diagnoses: None    MIPS       None    [facility]     Tony Bruce is a 84 year old male presents with soft tissue injury to the right upper extremity after a fall yesterday.  He has no focal bony tenderness to warrant radiographs.  No evidence of neurovascular injury.  He has no gómez laceration but has multiple skin tears in the right upper extremity as noted above.  Tetanus is up-to-date.  He has mild bleeding likely secondary to his Plavix for recent stent placement within the last 2 months.  He will continue his Plavix given the timing of his last coronary intervention.  Surgifoam was placed to the skin tears at high risk for rebleeding.  Wound was cleaned and dressed.  Patient safe for discharge home with good wound care.  Return to the ED for any worsening  symptoms.    Disposition   The patient was discharged.     Diagnosis     ICD-10-CM    1. Skin tear of right forearm without complication, initial encounter  S51.811A          Discharge Medications   New Prescriptions    No medications on file     Scribe Disclosure:  IStacy, am serving as a scribe at 9:37 AM on 7/30/2024 to document services personally performed by Jayden Berrios MD based on my observations and the provider's statements to me.        Jayden Berrios MD  07/30/24 0943

## 2024-08-20 ENCOUNTER — TELEPHONE (OUTPATIENT)
Dept: CARDIOLOGY | Facility: CLINIC | Age: 84
End: 2024-08-20
Payer: COMMERCIAL

## 2024-08-22 ENCOUNTER — ANCILLARY PROCEDURE (OUTPATIENT)
Dept: CARDIOLOGY | Facility: CLINIC | Age: 84
End: 2024-08-22
Attending: INTERNAL MEDICINE
Payer: COMMERCIAL

## 2024-08-22 DIAGNOSIS — Z95.0 CARDIAC PACEMAKER IN SITU: ICD-10-CM

## 2024-08-22 DIAGNOSIS — I49.5 SICK SINUS SYNDROME (H): ICD-10-CM

## 2024-08-22 DIAGNOSIS — I25.10 CORONARY ARTERY DISEASE INVOLVING NATIVE CORONARY ARTERY OF NATIVE HEART WITHOUT ANGINA PECTORIS: ICD-10-CM

## 2024-08-22 PROCEDURE — 93294 REM INTERROG EVL PM/LDLS PM: CPT | Performed by: INTERNAL MEDICINE

## 2024-08-22 PROCEDURE — 93296 REM INTERROG EVL PM/IDS: CPT | Performed by: INTERNAL MEDICINE

## 2024-08-23 ENCOUNTER — TELEPHONE (OUTPATIENT)
Dept: CARDIOLOGY | Facility: CLINIC | Age: 84
End: 2024-08-23

## 2024-08-23 ENCOUNTER — OFFICE VISIT (OUTPATIENT)
Dept: CARDIOLOGY | Facility: CLINIC | Age: 84
End: 2024-08-23
Attending: INTERNAL MEDICINE
Payer: COMMERCIAL

## 2024-08-23 VITALS
WEIGHT: 230.3 LBS | DIASTOLIC BLOOD PRESSURE: 60 MMHG | HEIGHT: 68 IN | OXYGEN SATURATION: 95 % | BODY MASS INDEX: 34.9 KG/M2 | HEART RATE: 63 BPM | SYSTOLIC BLOOD PRESSURE: 124 MMHG

## 2024-08-23 DIAGNOSIS — E78.00 PURE HYPERCHOLESTEROLEMIA: ICD-10-CM

## 2024-08-23 DIAGNOSIS — N18.31 CHRONIC RENAL FAILURE, STAGE 3A (H): ICD-10-CM

## 2024-08-23 DIAGNOSIS — I25.118 CORONARY ARTERY DISEASE OF NATIVE ARTERY OF NATIVE HEART WITH STABLE ANGINA PECTORIS (H): Primary | ICD-10-CM

## 2024-08-23 DIAGNOSIS — E66.812 CLASS 2 OBESITY DUE TO EXCESS CALORIES WITHOUT SERIOUS COMORBIDITY WITH BODY MASS INDEX (BMI) OF 35.0 TO 35.9 IN ADULT: ICD-10-CM

## 2024-08-23 DIAGNOSIS — I48.0 PAF (PAROXYSMAL ATRIAL FIBRILLATION) (H): ICD-10-CM

## 2024-08-23 DIAGNOSIS — G47.33 OSA (OBSTRUCTIVE SLEEP APNEA): Chronic | ICD-10-CM

## 2024-08-23 DIAGNOSIS — I10 ESSENTIAL HYPERTENSION, BENIGN: ICD-10-CM

## 2024-08-23 DIAGNOSIS — I49.5 SICK SINUS SYNDROME (H): ICD-10-CM

## 2024-08-23 DIAGNOSIS — I25.118 CORONARY ARTERY DISEASE OF NATIVE ARTERY OF NATIVE HEART WITH STABLE ANGINA PECTORIS (H): ICD-10-CM

## 2024-08-23 DIAGNOSIS — R06.09 DOE (DYSPNEA ON EXERTION): ICD-10-CM

## 2024-08-23 DIAGNOSIS — E66.09 CLASS 2 OBESITY DUE TO EXCESS CALORIES WITHOUT SERIOUS COMORBIDITY WITH BODY MASS INDEX (BMI) OF 35.0 TO 35.9 IN ADULT: ICD-10-CM

## 2024-08-23 DIAGNOSIS — Z95.0 CARDIAC PACEMAKER IN SITU: ICD-10-CM

## 2024-08-23 DIAGNOSIS — E78.00 PURE HYPERCHOLESTEROLEMIA: Chronic | ICD-10-CM

## 2024-08-23 LAB
MDC_IDC_LEAD_CONNECTION_STATUS: NORMAL
MDC_IDC_LEAD_CONNECTION_STATUS: NORMAL
MDC_IDC_LEAD_IMPLANT_DT: NORMAL
MDC_IDC_LEAD_IMPLANT_DT: NORMAL
MDC_IDC_LEAD_LOCATION: NORMAL
MDC_IDC_LEAD_LOCATION: NORMAL
MDC_IDC_LEAD_LOCATION_DETAIL_1: NORMAL
MDC_IDC_LEAD_LOCATION_DETAIL_1: NORMAL
MDC_IDC_LEAD_MFG: NORMAL
MDC_IDC_LEAD_MFG: NORMAL
MDC_IDC_LEAD_MODEL: NORMAL
MDC_IDC_LEAD_MODEL: NORMAL
MDC_IDC_LEAD_POLARITY_TYPE: NORMAL
MDC_IDC_LEAD_POLARITY_TYPE: NORMAL
MDC_IDC_LEAD_SERIAL: NORMAL
MDC_IDC_LEAD_SERIAL: NORMAL
MDC_IDC_MSMT_BATTERY_DTM: NORMAL
MDC_IDC_MSMT_BATTERY_REMAINING_LONGEVITY: 83 MO
MDC_IDC_MSMT_BATTERY_REMAINING_PERCENTAGE: 68 %
MDC_IDC_MSMT_BATTERY_RRT_TRIGGER: NORMAL
MDC_IDC_MSMT_BATTERY_STATUS: NORMAL
MDC_IDC_MSMT_BATTERY_VOLTAGE: 2.99 V
MDC_IDC_MSMT_LEADCHNL_RA_IMPEDANCE_VALUE: 400 OHM
MDC_IDC_MSMT_LEADCHNL_RA_LEAD_CHANNEL_STATUS: NORMAL
MDC_IDC_MSMT_LEADCHNL_RA_PACING_THRESHOLD_AMPLITUDE: 0.62 V
MDC_IDC_MSMT_LEADCHNL_RA_PACING_THRESHOLD_PULSEWIDTH: 0.4 MS
MDC_IDC_MSMT_LEADCHNL_RA_SENSING_INTR_AMPL: 4.2 MV
MDC_IDC_MSMT_LEADCHNL_RV_IMPEDANCE_VALUE: 560 OHM
MDC_IDC_MSMT_LEADCHNL_RV_LEAD_CHANNEL_STATUS: NORMAL
MDC_IDC_MSMT_LEADCHNL_RV_PACING_THRESHOLD_AMPLITUDE: 0.75 V
MDC_IDC_MSMT_LEADCHNL_RV_PACING_THRESHOLD_PULSEWIDTH: 0.4 MS
MDC_IDC_MSMT_LEADCHNL_RV_SENSING_INTR_AMPL: 9.5 MV
MDC_IDC_PG_IMPLANT_DTM: NORMAL
MDC_IDC_PG_MFG: NORMAL
MDC_IDC_PG_MODEL: NORMAL
MDC_IDC_PG_SERIAL: NORMAL
MDC_IDC_PG_TYPE: NORMAL
MDC_IDC_SESS_CLINIC_NAME: NORMAL
MDC_IDC_SESS_DTM: NORMAL
MDC_IDC_SESS_REPROGRAMMED: NO
MDC_IDC_SESS_TYPE: NORMAL
MDC_IDC_SET_BRADY_AT_MODE_SWITCH_MODE: NORMAL
MDC_IDC_SET_BRADY_AT_MODE_SWITCH_RATE: 180 {BEATS}/MIN
MDC_IDC_SET_BRADY_LOWRATE: 60 {BEATS}/MIN
MDC_IDC_SET_BRADY_MAX_SENSOR_RATE: 130 {BEATS}/MIN
MDC_IDC_SET_BRADY_MAX_TRACKING_RATE: 120 {BEATS}/MIN
MDC_IDC_SET_BRADY_MODE: NORMAL
MDC_IDC_SET_BRADY_PAV_DELAY_LOW: 200 MS
MDC_IDC_SET_BRADY_SAV_DELAY_LOW: 170 MS
MDC_IDC_SET_LEADCHNL_RA_PACING_AMPLITUDE: 1.62
MDC_IDC_SET_LEADCHNL_RA_PACING_ANODE_ELECTRODE_1: NORMAL
MDC_IDC_SET_LEADCHNL_RA_PACING_ANODE_LOCATION_1: NORMAL
MDC_IDC_SET_LEADCHNL_RA_PACING_CAPTURE_MODE: NORMAL
MDC_IDC_SET_LEADCHNL_RA_PACING_CATHODE_ELECTRODE_1: NORMAL
MDC_IDC_SET_LEADCHNL_RA_PACING_CATHODE_LOCATION_1: NORMAL
MDC_IDC_SET_LEADCHNL_RA_PACING_POLARITY: NORMAL
MDC_IDC_SET_LEADCHNL_RA_PACING_PULSEWIDTH: 0.4 MS
MDC_IDC_SET_LEADCHNL_RA_SENSING_ADAPTATION_MODE: NORMAL
MDC_IDC_SET_LEADCHNL_RA_SENSING_ANODE_ELECTRODE_1: NORMAL
MDC_IDC_SET_LEADCHNL_RA_SENSING_ANODE_LOCATION_1: NORMAL
MDC_IDC_SET_LEADCHNL_RA_SENSING_CATHODE_ELECTRODE_1: NORMAL
MDC_IDC_SET_LEADCHNL_RA_SENSING_CATHODE_LOCATION_1: NORMAL
MDC_IDC_SET_LEADCHNL_RA_SENSING_POLARITY: NORMAL
MDC_IDC_SET_LEADCHNL_RA_SENSING_SENSITIVITY: 0.3 MV
MDC_IDC_SET_LEADCHNL_RV_PACING_AMPLITUDE: 1 V
MDC_IDC_SET_LEADCHNL_RV_PACING_ANODE_ELECTRODE_1: NORMAL
MDC_IDC_SET_LEADCHNL_RV_PACING_ANODE_LOCATION_1: NORMAL
MDC_IDC_SET_LEADCHNL_RV_PACING_CAPTURE_MODE: NORMAL
MDC_IDC_SET_LEADCHNL_RV_PACING_CATHODE_ELECTRODE_1: NORMAL
MDC_IDC_SET_LEADCHNL_RV_PACING_CATHODE_LOCATION_1: NORMAL
MDC_IDC_SET_LEADCHNL_RV_PACING_POLARITY: NORMAL
MDC_IDC_SET_LEADCHNL_RV_PACING_PULSEWIDTH: 0.4 MS
MDC_IDC_SET_LEADCHNL_RV_SENSING_ADAPTATION_MODE: NORMAL
MDC_IDC_SET_LEADCHNL_RV_SENSING_ANODE_ELECTRODE_1: NORMAL
MDC_IDC_SET_LEADCHNL_RV_SENSING_ANODE_LOCATION_1: NORMAL
MDC_IDC_SET_LEADCHNL_RV_SENSING_CATHODE_ELECTRODE_1: NORMAL
MDC_IDC_SET_LEADCHNL_RV_SENSING_CATHODE_LOCATION_1: NORMAL
MDC_IDC_SET_LEADCHNL_RV_SENSING_POLARITY: NORMAL
MDC_IDC_SET_LEADCHNL_RV_SENSING_SENSITIVITY: 0.5 MV
MDC_IDC_STAT_AT_BURDEN_PERCENT: 0 %
MDC_IDC_STAT_AT_DTM_END: NORMAL
MDC_IDC_STAT_AT_DTM_START: NORMAL
MDC_IDC_STAT_AT_MODE_SW_COUNT: 0
MDC_IDC_STAT_AT_MODE_SW_COUNT_PER_DAY: 0
MDC_IDC_STAT_AT_MODE_SW_PERCENT_TIME: 0 %
MDC_IDC_STAT_BRADY_AP_VP_PERCENT: 40 %
MDC_IDC_STAT_BRADY_AP_VS_PERCENT: 39 %
MDC_IDC_STAT_BRADY_AS_VP_PERCENT: 1.1 %
MDC_IDC_STAT_BRADY_AS_VS_PERCENT: 20 %
MDC_IDC_STAT_BRADY_DTM_END: NORMAL
MDC_IDC_STAT_BRADY_DTM_START: NORMAL
MDC_IDC_STAT_BRADY_RA_PERCENT_PACED: 76 %
MDC_IDC_STAT_BRADY_RV_PERCENT_PACED: 41 %
MDC_IDC_STAT_CRT_DTM_END: NORMAL
MDC_IDC_STAT_CRT_DTM_START: NORMAL
MDC_IDC_STAT_HEART_RATE_ATRIAL_MAX: 320 {BEATS}/MIN
MDC_IDC_STAT_HEART_RATE_ATRIAL_MEAN: 69 {BEATS}/MIN
MDC_IDC_STAT_HEART_RATE_ATRIAL_MIN: 50 {BEATS}/MIN
MDC_IDC_STAT_HEART_RATE_DTM_END: NORMAL
MDC_IDC_STAT_HEART_RATE_DTM_START: NORMAL
MDC_IDC_STAT_HEART_RATE_VENTRICULAR_MAX: 250 {BEATS}/MIN
MDC_IDC_STAT_HEART_RATE_VENTRICULAR_MEAN: 69 {BEATS}/MIN
MDC_IDC_STAT_HEART_RATE_VENTRICULAR_MIN: 40 {BEATS}/MIN

## 2024-08-23 PROCEDURE — 99215 OFFICE O/P EST HI 40 MIN: CPT | Performed by: INTERNAL MEDICINE

## 2024-08-23 RX ORDER — OXCARBAZEPINE 150 MG/1
150 TABLET, FILM COATED ORAL DAILY
COMMUNITY

## 2024-08-23 RX ORDER — EZETIMIBE 10 MG/1
10 TABLET ORAL DAILY
Qty: 90 TABLET | Refills: 4 | Status: SHIPPED | OUTPATIENT
Start: 2024-08-23

## 2024-08-23 RX ORDER — TRAMADOL HYDROCHLORIDE 50 MG/1
50 TABLET ORAL 2 TIMES DAILY
COMMUNITY
Start: 2024-07-09

## 2024-08-23 NOTE — TELEPHONE ENCOUNTER
----- Message from Nabeel Allred sent at 8/23/2024 12:28 PM CDT -----  Tony wants before and after pictures of his intervention from May.  See if the Cath Lab at [facility] can do that and if so mail them to him.    Nabeel Allred MD  [facility] Heart Team 2  I have reviewed [facility] labs and his latest LDL is 64.  I recommended adding Zetia 10 mg to his regiment in effort to try to drive LDL below 55.  I have put orders in for Zetia and for follow-up blood work in 1 month.  [facility] is [facility] so I do not know if they need a faxed order.  I did send through electronically if that works.  Please call him and tell him because I decided this after his visit      Spoke to patient, reviewed messages as above. He said the pictures on the cath summary will suffice, will mail. Address confirmed. He wants the [facility]ia [facility] to the [facility], will mail pertinent records with script. They are leaving for [place] for the winter in early October, depending on when he can start the zetia, may need to send lab orders to doctor in [place].

## 2024-08-23 NOTE — PROGRESS NOTES
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    Sick sinus syndrome with a pacemaker placed in [place] 2022.  Pacemaker has identified paroxysmal atrial fibrillation for which the [facility] changed his anticoagulant to edoxaban 60 mg daily.  He also has obstructive sleep apnea for which he does not use a CPAP mask.     He had COVID in 03/2020.   Tony thought he had worsening dyspnea on exertion.  He had no chest, arm, neck, jaw or shoulder discomfort.  His activity is mostly limited by debilitating back pain.  A stress nuclear scan performed through the [facility] upon return to [place] demonstrated a small reversible defect in the basilar to mid inferior wall.  Ejection fraction was 63% without focal wall motion abnormalities.  BNP was 174.       Echocardiogram  09/2021 demonstrated ejection fraction of 60%-65% without focal wall motion abnormalities, no significant valvular pathology and normal pulmonary pressures.     Because of his abnormal stress test, we took him to the Cath Lab 2022, which demonstrated what appeared to be a chronically occluded right coronary artery filled by left to right collaterals.  He did have a distal left main stenosis in the 25% range.  The proximal LAD appeared to have a stenosis in the 50% -60% range.  The iFR of the left anterior descending artery was 0.85.  In an attempt to perform angioplasty of the proximal ostial LAD, I was unable to advance a balloon and I decided we would set up as a CHIP case as it was heavily calcified.     We added Imdur 15 mg to his regimen and Plavix and started him in Cardiac Rehab.  When he returned for followup, he thought he was feeling  "significantly better and did not want to follow up with the CHIP team.     Follow-up April 2023 he complained of severe disabling dyspnea on exertion.  He has no orthopnea or PND.  No peripheral edema.  He also states all of his joints knees, shoulder and back also limits his activity.  He complained nobody wants to touch him to help him improve.      May 2024 I sent him to the Cath Lab where he underwent successful intervention with Impella CP support undergoing rotational atherectomy and IVUS directed stent deployment of his left main and proximal left anterior descending artery.     In retrospect he does not think he is that much better.  He is having no bleeding problems with his clopidogrel therapy.    He has multiple other complaints that is being evaluated including tingling in his face and neck for which she is undergoing evaluation by neurology.  Chronic problems with both shoulders for which she has had injections.  Lower back issues.  He brings his [facility] lab, he and his wife also are concerned about his \"anemia \" with a hemoglobin of 12.7     Interrogation of device shows he A paces 76 per time of the time he paces in the ventricle 41% of the time.  He had no atrial or ventricular arrhythmias.  As stated his splay and his heart rate appears to be predominantly in the 60s and 70s.  Rate response was been adjusted.     Preprocedure echocardiogram demonstrated ejection fraction of 60 to 65% without any valvular pathology.  IVC was described as normal     ASSESSMENT AND PLAN:   Tony has no clear anginal symptoms and his dyspnea appears to be somewhat improved.  As I told him I think his dyspnea on exertion is multifactorial to what ever degree was contributed by the stenosis has now been alleviated and fortunately did not result in significant improvement.  I told him I think a significant component is age, deconditioning and obesity.  I recommended that he exercise regularly.      He is currently on Plavix and " edoxaban.  I will discontinue his Plavix at his 1 year anniversary in May.     Previous adjustments in his pacemaker settings have also not resulted in a significant improvement     Tony's heart failure appears to be well compensated.  Last N-terminal proBNP in 2021 was 113, another indication of a noncardiac source for his ÁLVAREZ.     Most recent fasting lipid profile [facility] the [facility] from [facility] shows a total cholesterol of 123, HDL of 45, LDL 64 and triglycerides of 69.  This does not meet the latest guidelines of LDL of less than 50.  I have recommended adding Zetia 10 mg to his regiment.  Recheck a fasting lipid profile and ALT in 1 month.     Blood pressure is well controlled today at 124/60 with a pulse of 63     Weight is 230 pounds,.  Body mass index is 35.02.  His weight is down about 10 pounds from last May    We talked about his tingling in his face is most likely a nerve problem which he is pursuing.  His shoulder and back problems clearly appear to be musculoskeletal in nature.  I looked at his hemoglobin 12.7 and I pointed out that he has had this low-grade anemia dating back to 2016.     I will plan on follow-up in 9 months.  I will follow-up on his lab work with the introduction of Zetia thank you for allow me to participate in this patient's care.  Sincerely,                                Nabeel Allred MD [facility]    Today's clinic visit entailed:  Review of external notes as documented elsewhere in note  Review of the result(s) of each unique test - lab work, [facility] records, cath  Ordering of each unique test  Prescription drug management  45 minutes spent by me on the date of the encounter doing chart review, history and exam, documentation and further activities per the note  Provider  Link to [facility] Help Grid     The level of medical decision making during this visit was of moderate complexity.      Orders Placed This Encounter   Procedures    Basic metabolic panel    Lipid Profile    ALT    Follow-Up with  Cardiology       Orders Placed This Encounter   Medications    traMADol (ULTRAM) 50 MG tablet     Sig: Take 50 mg by mouth 2 times daily.    OXcarbazepine (TRILEPTAL) 150 MG tablet     Sig: Take 150 mg by mouth daily.       There are no discontinued medications.      Encounter Diagnoses   Name Primary?    Coronary artery disease of native artery of native heart with stable angina pectoris (H24)     PAF (paroxysmal atrial fibrillation) (H)     Essential hypertension, benign     Cardiac pacemaker in situ     Sick sinus syndrome (H)     ÁLVAREZ (dyspnea on exertion)     Class 2 obesity due to excess calories without serious comorbidity with body mass index (BMI) of 35.0 to 35.9 in adult     Chronic renal failure, stage 3a (H)     LIZANDRO (obstructive sleep apnea)     Pure hypercholesterolemia        CURRENT MEDICATIONS:  Current Outpatient Medications   Medication Sig Dispense Refill    acetaminophen 500 MG CAPS 2 tablets twice a day      atorvastatin (LIPITOR) 80 MG tablet Take 1 tablet (80 mg) by mouth daily 90 tablet 4    Calcium Carb-Cholecalciferol (CALCIUM-VITAMIN D3) 250-125 MG-UNIT TABS Take 2 tablets by mouth 2 times daily      clopidogrel (PLAVIX) 75 MG tablet Take 1 tablet (75 mg) by mouth daily 90 tablet 3    cyanocobalamin (VITAMIN B-12) 1000 MCG tablet Take by mouth daily      diclofenac (VOLTAREN) 1 % GEL topical gel Place onto the skin as needed for moderate pain      docusate sodium (COLACE) 100 MG capsule Take 100 mg by mouth 2 times daily as needed for constipation      edoxaban ANTICOAGULANT (SAVAYSA) 60 MG TABS tablet Take 1 tablet (60 mg) by mouth daily 90 tablet 4    Emollient (VANICREAM EX) APPLY THIN LAYER    TWICE A DAY FOR DRY SKIN      finasteride (PROSCAR) 5 MG tablet Take 5 mg by mouth daily      furosemide (LASIX) 20 MG tablet Take 1 tablet (20 mg) by mouth daily 30 tablet 3    hydrocortisone 2.5 % cream Apply topically 2 times daily      isosorbide mononitrate (IMDUR) 30 MG 24 hr tablet Take 15  "mg by mouth daily      lidocaine (LIDODERM) 5 % Patch Place 1 patch onto the skin      losartan (COZAAR) 50 MG tablet Take 1 tablet (50 mg) by mouth daily 90 tablet 4    nitroGLYcerin (NITROSTAT) 0.4 MG sublingual tablet One tablet under the tongue every 5 minutes if needed for chest pain. May repeat every 5 minutes for a maximum of 3 doses in 15 minutes\" 25 tablet 3    omega 3 1000 MG CAPS Take by mouth daily      omeprazole (PRILOSEC) 20 MG DR capsule Take 20 mg by mouth daily      OXcarbazepine (TRILEPTAL) 150 MG tablet Take 150 mg by mouth daily.      polyethylene glycol (MIRALAX/GLYCOLAX) Packet Take 1 packet by mouth as needed for constipation      primidone (MYSOLINE) 250 MG tablet Take 300 mg by mouth 2 times daily      propranolol ER (INDERAL LA) 120 MG 24 hr capsule Take 120 mg by mouth daily      psyllium 0.52 g capsule Take 1 capsule by mouth daily      sodium fluoride dental gel (PREVIDENT) 1.1 % GEL topical gel Apply to affected area At Bedtime      spironolactone (ALDACTONE) 25 MG tablet Take 1 tablet (25 mg) by mouth daily 90 tablet 3    tamsulosin (FLOMAX) 0.4 MG capsule Take 0.4 mg by mouth daily      terbinafine (LAMISIL) 1 % external cream Apply topically 2 times daily      traMADol (ULTRAM) 50 MG tablet Take 50 mg by mouth 2 times daily.      Vitamin D, Cholecalciferol, 1000 units CAPS Take by mouth daily         ALLERGIES     Allergies   Allergen Reactions    Lisinopril     Morphine      confusion       PAST MEDICAL HISTORY:  Past Medical History:   Diagnosis Date    Coronary atherosclerosis of unspecified type of vessel, native or graft 08/2001    cardiac cath 5/2022: unsuccessful attempt to LAD-medical management; cath 2005: GER to LAD; cath 2002: GER to RCA    Essential hypertension, benign     INTERMITTENT HYPERTENSION    Essential tremor     Generalized osteoarthrosis, unspecified site     Hyperlipidaemia     Hypertrophy of prostate without urinary obstruction and other lower urinary tract " symptoms (LUTS)     BPH - Dr Pinto    Impotence of organic origin     Dr Pinto - Dr Allred    NONSPECIFIC MEDICAL HISTORY     CERVICAL/LUMBAR RADICULOPATHY    NONSPECIFIC MEDICAL HISTORY     SHOULDER IMPINGEMENT    NSTEMI (non-ST elevated myocardial infarction) (H)     inferior 2001    Obese     Other and unspecified hyperlipidemia     ?    Peripheral neuropathy     Sick sinus syndrome (H)     s/p [employer] pacemaker implanted March 2022 in [place]       PAST SURGICAL HISTORY:  Past Surgical History:   Procedure Laterality Date    CARDIAC NUC DANISHA STRESS TEST NL  4/09    normal    COLONOSCOPY  6/06    normal - diverticulosis - dr adams    COLONOSCOPY  8/14/2012    Procedure: COLONOSCOPY;  COLONOSCOPY SCREENING/ 6 YEAR FOLLOW UP;  Surgeon: Rey Adams MD;  Location:  GI    CV CORONARY ANGIOGRAM N/A 5/25/2022    Procedure: Coronary Angiogram;  Surgeon: Nabeel Allred MD;  Location:  HEART CARDIAC CATH LAB    CV IMPELLA INSERTION N/A 5/9/2024    Procedure: Impella Insertion;  Surgeon: Con Blood MD;  Location:  HEART CARDIAC CATH LAB    CV IMPELLA REMOVAL N/A 5/9/2024    Procedure: Impella Removal;  Surgeon: Con Blood MD;  Location:  HEART CARDIAC CATH LAB    CV INSTANTANEOUS WAVE-FREE RATIO N/A 5/25/2022    Procedure: Instantaneous Wave-Free Ratio;  Surgeon: Nabeel Allred MD;  Location:  HEART CARDIAC CATH LAB    CV INTRAVASULAR ULTRASOUND N/A 5/9/2024    Procedure: Intravascular Ultrasound;  Surgeon: Con Blood MD;  Location:  HEART CARDIAC CATH LAB    CV LEFT HEART CATH N/A 5/25/2022    Procedure: Left Heart Catheterization;  Surgeon: Nabeel Allred MD;  Location:  HEART CARDIAC CATH LAB    CV LEFT HEART CATH N/A 5/9/2024    Procedure: Left Heart Catheterization;  Surgeon: Con Blood MD;  Location:  HEART CARDIAC CATH LAB    CV PCI N/A 5/25/2022    Procedure: Percutaneous Coronary Intervention;  Surgeon: Nabeel Allred  MD;  Location:  HEART CARDIAC CATH LAB    CV PCI ATHERECTOMY ORBITAL N/A 5/9/2024    Procedure: Percutaneous Coronary Intervention - Atherectomy Rotational;  Surgeon: Con Blood MD;  Location:  HEART CARDIAC CATH LAB    CV PCI STENT DRUG ELUTING N/A 5/9/2024    Procedure: Percutaneous Coronary Intervention Stent;  Surgeon: Con Blood MD;  Location:  HEART CARDIAC CATH LAB    SURGICAL HISTORY OF -   1990    S/P CERVICAL DISCECTOMY x 2    SURGICAL HISTORY OF -   1990    S/P LUMBAR DISCECTOMY x 2    SURGICAL HISTORY OF -   1993    S/P LT CARPAL TUNNEL SURG/SHOULDER IMPINGEMENT    SURGICAL HISTORY OF -   8/01    S/P STENT OF RT CORONARY ARTERY    SURGICAL HISTORY OF -   9/05    Drug eluting stent to LAD    ZZC TOTAL KNEE ARTHROPLASTY  7/04    Knee Replacement, Total - LEFT Dr Regan    ZZ REPAIR UMBILICAL CHIKA,5+Y/O,REDUC  9/04    dr dominique       FAMILY HISTORY:  Family History   Problem Relation Age of Onset    Respiratory Father         COPD    Cerebrovascular Disease Mother         CVA    C.A.D. Brother         CABG - after CABG x 3 - rheumatic fever as a child    Breast Cancer Sister     Cerebrovascular Disease Sister        SOCIAL HISTORY:  Social History     Socioeconomic History    Marital status:      Spouse name: paola    Number of children: 4    Years of education: 14    Highest education level: None   Tobacco Use    Smoking status: Never    Smokeless tobacco: Never   Substance and Sexual Activity    Alcohol use: No    Drug use: No    Sexual activity: Yes     Partners: Female   Other Topics Concern    Caffeine Concern Yes     Comment: 3-4 cups qd    Exercise Yes     Comment: limited    Seat Belt Yes       Review of Systems:  Skin:  Negative       Eyes:  Positive for double vision    ENT:  Negative      Respiratory:  Negative       Cardiovascular:    fatigue;Positive for Has trouble walking long distance  Gastroenterology: Negative      Genitourinary:  Positive for  "prostate problem    Musculoskeletal:  Positive for joint pain;back pain    Neurologic:  Negative      Psychiatric:  Negative      Heme/Lymph/Imm:  Negative      Endocrine:  Negative        Physical Exam:  Vitals: /60 (BP Location: Right arm, Patient Position: Sitting, Cuff Size: Adult Large)   Pulse 63   Ht 1.727 m (5' 8\")   Wt 104.5 kg (230 lb 4.8 oz)   SpO2 95%   BMI 35.02 kg/m      Constitutional:  cooperative, alert and oriented, well developed, well nourished, in no acute distress obese      Skin:  warm and dry to the touch, no apparent skin lesions or masses noted          Head:  normocephalic, no masses or lesions        Eyes:  pupils equal and round, conjunctivae and lids unremarkable, sclera white, no xanthalasma, EOMS intact, no nystagmus        Lymph:      ENT:  no pallor or cyanosis, dentition good        Neck:  carotid pulses are full and equal bilaterally        Respiratory:  normal breath sounds, clear to auscultation, normal A-P diameter, normal symmetry, normal respiratory excursion, no use of accessory muscles         Cardiac: regular rhythm;normal S1 and S2;no S3 or S4       systolic murmur;RUSB;grade 1        pulses full and equal                                   Right groin ecchymoses, no hematoma no bruit, bandage removed    GI:    obese      Extremities and Muscular Skeletal:  no edema;no spinal abnormalities noted;normal muscle strength and tone              Neurological:  no gross motor deficits   Resting tremor    Psych:  affect appropriate, oriented to time, person and place        CC  Nabeel Allred MD  [address]  [place] [id number]                "

## 2024-08-23 NOTE — LETTER
8/23/2024    [facility]  [facility] [address]    RE: Tony Bruce       Dear Colleague,     I had the pleasure of seeing Tony Bruce in the [facility].  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    Sick sinus syndrome with a pacemaker placed in [place] 2022.  Pacemaker has identified paroxysmal atrial fibrillation [facility] which [facility] to edoxaban 60 mg daily.  He also has obstructive sleep apnea for which he does not use a CPAP mask.     He had COVID in 03/2020.   Tony thought he had worsening dyspnea on exertion.  He had no chest, arm, neck, jaw or shoulder discomfort.  His activity is mostly limited by debilitating back pain.  A stress nuclear [facility] through the [facility] upon return to [place] demonstrated a small reversible defect in the basilar to mid inferior wall.  Ejection fraction was 63% without focal wall motion abnormalities.  BNP was 174.       Echocardiogram  09/2021 demonstrated ejection fraction of 60%-65% without focal wall motion abnormalities, no significant valvular pathology and normal pulmonary pressures.     Because of his abnormal stress test, we took him to the Cath Lab 2022, which demonstrated what appeared to be a chronically occluded right coronary artery filled by left to right collaterals.  He did have a distal left main stenosis in the 25% range.  The proximal LAD appeared to have a stenosis in the 50% -60% range.  The iFR of the left anterior descending artery was 0.85.  In an attempt to perform angioplasty of the proximal ostial LAD, I was unable to advance a balloon and I decided  "we would set up as a CHIP case as it was heavily calcified.     We added Imdur 15 mg to his regimen and Plavix and started him in Cardiac Rehab.  When he returned for followup, he thought he was feeling significantly better and did not want to follow up with the CHIP team.     Follow-up April 2023 he complained of severe disabling dyspnea on exertion.  He has no orthopnea or PND.  No peripheral edema.  He also states all of his joints knees, shoulder and back also limits his activity.  He complained nobody wants to touch him to help him improve.      May 2024 I sent him to the Cath Lab where he underwent successful intervention with Impella CP support undergoing rotational atherectomy and IVUS directed stent deployment of his left main and proximal left anterior descending artery.     In retrospect he does not think he is that much better.  He is having no bleeding problems with his clopidogrel therapy.    He has multiple other complaints that is being evaluated including tingling in his face and neck for which she is undergoing evaluation by neurology.  Chronic problems with both shoulders for which she has had injections.  Lower back issues.  He brings his [facility] lab, he and his wife also are concerned about his \"anemia \" with a hemoglobin of 12.7     Interrogation of device shows he A paces 76 per time of the time he paces in the ventricle 41% of the time.  He had no atrial or ventricular arrhythmias.  As stated his splay and his heart rate appears to be predominantly in the 60s and 70s.  Rate response was been adjusted.     Preprocedure echocardiogram demonstrated ejection fraction of 60 to 65% without any valvular pathology.  IVC was described as normal     ASSESSMENT AND PLAN:   Tony has no clear anginal symptoms and his dyspnea appears to be somewhat improved.  As I told him I think his dyspnea on exertion is multifactorial to what ever degree was contributed by the stenosis has now been alleviated and " fortunately did not result in significant improvement.  I told him I think a significant component is age, deconditioning and obesity.  I recommended that he exercise regularly.      He is currently on Plavix and edoxaban.  I will discontinue his Plavix at his 1 year anniversary in May.     Previous adjustments in his pacemaker settings have also not resulted in a significant improvement     Tony's heart failure appears to be well compensated.  Last N-terminal proBNP in 2021 was 113, another indication of a noncardiac source for his ÁLVAREZ.     Most recent fasting lipid profile [facility] the [facility] from August shows a total cholesterol of 123, HDL of 45, LDL 64 and triglycerides of 69.  This does not meet the latest guidelines of LDL of less than 50.  I have recommended adding Zetia 10 mg to his regiment.  Recheck a fasting lipid profile and ALT in 1 month.     Blood pressure is well controlled today at 124/60 with a pulse of 63     Weight is 230 pounds,.  Body mass index is 35.02.  His weight is down about 10 pounds from last May    We talked about his tingling in his face is most likely a nerve problem which he is pursuing.  His shoulder and back problems clearly appear to be musculoskeletal in nature.  I looked at his hemoglobin 12.7 and I pointed out that he has had this low-grade anemia dating back to 2016.     I will plan on follow-up in 9 months.  I will follow-up on his lab work with the introduction of Zetia thank you for allow me to participate in this patient's care.  Sincerely,                                Nabeel Allred MD [facility]    Today's clinic visit entailed:  Review of external notes as documented elsewhere in note  Review of the result(s) of each unique test - lab work, [facility] records, cath  Ordering of each unique test  Prescription drug management  45 minutes spent by me on the date of the encounter doing chart review, history and exam, documentation and further activities per the note  Provider  Link to  MDM Help Grid     The level of medical decision making during this visit was of moderate complexity.      Orders Placed This Encounter   Procedures     Basic metabolic panel     Lipid Profile     ALT     Follow-Up with Cardiology       Orders Placed This Encounter   Medications     traMADol (ULTRAM) 50 MG tablet     Sig: Take 50 mg by mouth 2 times daily.     OXcarbazepine (TRILEPTAL) 150 MG tablet     Sig: Take 150 mg by mouth daily.       There are no discontinued medications.      Encounter Diagnoses   Name Primary?     Coronary artery disease of native artery of native heart with stable angina pectoris (H24)      PAF (paroxysmal atrial fibrillation) (H)      Essential hypertension, benign      Cardiac pacemaker in situ      Sick sinus syndrome (H)      ÁLVAREZ (dyspnea on exertion)      Class 2 obesity due to excess calories without serious comorbidity with body mass index (BMI) of 35.0 to 35.9 in adult      Chronic renal failure, stage 3a (H)      LIZANDRO (obstructive sleep apnea)      Pure hypercholesterolemia        CURRENT MEDICATIONS:  Current Outpatient Medications   Medication Sig Dispense Refill     acetaminophen 500 MG CAPS 2 tablets twice a day       atorvastatin (LIPITOR) 80 MG tablet Take 1 tablet (80 mg) by mouth daily 90 tablet 4     Calcium Carb-Cholecalciferol (CALCIUM-VITAMIN D3) 250-125 MG-UNIT TABS Take 2 tablets by mouth 2 times daily       clopidogrel (PLAVIX) 75 MG tablet Take 1 tablet (75 mg) by mouth daily 90 tablet 3     cyanocobalamin (VITAMIN B-12) 1000 MCG tablet Take by mouth daily       diclofenac (VOLTAREN) 1 % GEL topical gel Place onto the skin as needed for moderate pain       docusate sodium (COLACE) 100 MG capsule Take 100 mg by mouth 2 times daily as needed for constipation       edoxaban ANTICOAGULANT (SAVAYSA) 60 MG TABS tablet Take 1 tablet (60 mg) by mouth daily 90 tablet 4     Emollient (VANICREAM EX) APPLY THIN LAYER    TWICE A DAY FOR DRY SKIN       finasteride (PROSCAR) 5 MG  "tablet Take 5 mg by mouth daily       furosemide (LASIX) 20 MG tablet Take 1 tablet (20 mg) by mouth daily 30 tablet 3     hydrocortisone 2.5 % cream Apply topically 2 times daily       isosorbide mononitrate (IMDUR) 30 MG 24 hr tablet Take 15 mg by mouth daily       lidocaine (LIDODERM) 5 % Patch Place 1 patch onto the skin       losartan (COZAAR) 50 MG tablet Take 1 tablet (50 mg) by mouth daily 90 tablet 4     nitroGLYcerin (NITROSTAT) 0.4 MG sublingual tablet One tablet under the tongue every 5 minutes if needed for chest pain. May repeat every 5 minutes for a maximum of 3 doses in 15 minutes\" 25 tablet 3     omega 3 1000 MG CAPS Take by mouth daily       omeprazole (PRILOSEC) 20 MG DR capsule Take 20 mg by mouth daily       OXcarbazepine (TRILEPTAL) 150 MG tablet Take 150 mg by mouth daily.       polyethylene glycol (MIRALAX/GLYCOLAX) Packet Take 1 packet by mouth as needed for constipation       primidone (MYSOLINE) 250 MG tablet Take 300 mg by mouth 2 times daily       propranolol ER (INDERAL LA) 120 MG 24 hr capsule Take 120 mg by mouth daily       psyllium 0.52 g capsule Take 1 capsule by mouth daily       sodium fluoride dental gel (PREVIDENT) 1.1 % GEL topical gel Apply to affected area At Bedtime       spironolactone (ALDACTONE) 25 MG tablet Take 1 tablet (25 mg) by mouth daily 90 tablet 3     tamsulosin (FLOMAX) 0.4 MG capsule Take 0.4 mg by mouth daily       terbinafine (LAMISIL) 1 % external cream Apply topically 2 times daily       traMADol (ULTRAM) 50 MG tablet Take 50 mg by mouth 2 times daily.       Vitamin D, Cholecalciferol, 1000 units CAPS Take by mouth daily         ALLERGIES     Allergies   Allergen Reactions     Lisinopril      Morphine      confusion       PAST MEDICAL HISTORY:  Past Medical History:   Diagnosis Date     Coronary atherosclerosis of unspecified type of vessel, native or graft 08/2001    cardiac cath 5/2022: unsuccessful attempt to LAD-medical management; cath 2005: GER to " LAD; cath 2002: GER to RCA     Essential hypertension, benign     INTERMITTENT HYPERTENSION     Essential tremor      Generalized osteoarthrosis, unspecified site      Hyperlipidaemia      Hypertrophy of prostate without urinary obstruction and other lower urinary tract symptoms (LUTS)     BPH - Dr Pinto     Impotence of organic origin     Dr Pinto - Dr Allred     NONSPECIFIC MEDICAL HISTORY     CERVICAL/LUMBAR RADICULOPATHY     NONSPECIFIC MEDICAL HISTORY     SHOULDER IMPINGEMENT     NSTEMI (non-ST elevated myocardial infarction) (H)     inferior 2001     Obese      Other and unspecified hyperlipidemia     ?     Peripheral neuropathy      Sick sinus syndrome (H)     s/p [employer] pacemaker implanted March 2022 in [place]       PAST SURGICAL HISTORY:  Past Surgical History:   Procedure Laterality Date     CARDIAC NUC DANISHA STRESS TEST NL  4/09    normal     COLONOSCOPY  6/06    normal - diverticulosis - dr adams     COLONOSCOPY  8/14/2012    Procedure: COLONOSCOPY;  COLONOSCOPY SCREENING/ 6 YEAR FOLLOW UP;  Surgeon: Rey Adams MD;  Location:  GI     CV CORONARY ANGIOGRAM N/A 5/25/2022    Procedure: Coronary Angiogram;  Surgeon: Nabeel Allred MD;  Location:  HEART CARDIAC CATH LAB     CV IMPELLA INSERTION N/A 5/9/2024    Procedure: Impella Insertion;  Surgeon: Con Blood MD;  Location:  HEART CARDIAC CATH LAB     CV IMPELLA REMOVAL N/A 5/9/2024    Procedure: Impella Removal;  Surgeon: Con Blood MD;  Location:  HEART CARDIAC CATH LAB     CV INSTANTANEOUS WAVE-FREE RATIO N/A 5/25/2022    Procedure: Instantaneous Wave-Free Ratio;  Surgeon: Nabeel Allred MD;  Location:  HEART CARDIAC CATH LAB     CV INTRAVASULAR ULTRASOUND N/A 5/9/2024    Procedure: Intravascular Ultrasound;  Surgeon: Con Blood MD;  Location:  HEART CARDIAC CATH LAB     CV LEFT HEART CATH N/A 5/25/2022    Procedure: Left Heart Catheterization;  Surgeon: Nabeel Allred MD;   Location:  HEART CARDIAC CATH LAB     CV LEFT HEART CATH N/A 5/9/2024    Procedure: Left Heart Catheterization;  Surgeon: Con Blood MD;  Location:  HEART CARDIAC CATH LAB     CV PCI N/A 5/25/2022    Procedure: Percutaneous Coronary Intervention;  Surgeon: Nabeel Allred MD;  Location:  HEART CARDIAC CATH LAB     CV PCI ATHERECTOMY ORBITAL N/A 5/9/2024    Procedure: Percutaneous Coronary Intervention - Atherectomy Rotational;  Surgeon: Con Blood MD;  Location:  HEART CARDIAC CATH LAB     CV PCI STENT DRUG ELUTING N/A 5/9/2024    Procedure: Percutaneous Coronary Intervention Stent;  Surgeon: Con Blood MD;  Location:  HEART CARDIAC CATH LAB     SURGICAL HISTORY OF -   1990    S/P CERVICAL DISCECTOMY x 2     SURGICAL HISTORY OF -   1990    S/P LUMBAR DISCECTOMY x 2     SURGICAL HISTORY OF -   1993    S/P LT CARPAL TUNNEL SURG/SHOULDER IMPINGEMENT     SURGICAL HISTORY OF -   8/01    S/P STENT OF RT CORONARY ARTERY     SURGICAL HISTORY OF -   9/05    Drug eluting stent to LAD     Z TOTAL KNEE ARTHROPLASTY  7/04    Knee Replacement, Total - LEFT Dr Regan     ZZ REPAIR UMBILICAL CHIKA,5+Y/O,REDUC  9/04    dr dominique       FAMILY HISTORY:  Family History   Problem Relation Age of Onset     Respiratory Father         COPD     Cerebrovascular Disease Mother         CVA     C.A.D. Brother         CABG - after CABG x 3 - rheumatic fever as a child     Breast Cancer Sister      Cerebrovascular Disease Sister        SOCIAL HISTORY:  Social History     Socioeconomic History     Marital status:      Spouse name: paola     Number of children: 4     Years of education: 14     Highest education level: None   Tobacco Use     Smoking status: Never     Smokeless tobacco: Never   Substance and Sexual Activity     Alcohol use: No     Drug use: No     Sexual activity: Yes     Partners: Female   Other Topics Concern     Caffeine Concern Yes     Comment: 3-4 cups qd      "Exercise Yes     Comment: limited     Seat Belt Yes       Review of Systems:  Skin:  Negative       Eyes:  Positive for double vision    ENT:  Negative      Respiratory:  Negative       Cardiovascular:    fatigue;Positive for Has trouble walking long distance  Gastroenterology: Negative      Genitourinary:  Positive for prostate problem    Musculoskeletal:  Positive for joint pain;back pain    Neurologic:  Negative      Psychiatric:  Negative      Heme/Lymph/Imm:  Negative      Endocrine:  Negative        Physical Exam:  Vitals: /60 (BP Location: Right arm, Patient Position: Sitting, Cuff Size: Adult Large)   Pulse 63   Ht 1.727 m (5' 8\")   Wt 104.5 kg (230 lb 4.8 oz)   SpO2 95%   BMI 35.02 kg/m      Constitutional:  cooperative, alert and oriented, well developed, well nourished, in no acute distress obese      Skin:  warm and dry to the touch, no apparent skin lesions or masses noted          Head:  normocephalic, no masses or lesions        Eyes:  pupils equal and round, conjunctivae and lids unremarkable, sclera white, no xanthalasma, EOMS intact, no nystagmus        Lymph:      ENT:  no pallor or cyanosis, dentition good        Neck:  carotid pulses are full and equal bilaterally        Respiratory:  normal breath sounds, clear to auscultation, normal A-P diameter, normal symmetry, normal respiratory excursion, no use of accessory muscles         Cardiac: regular rhythm;normal S1 and S2;no S3 or S4       systolic murmur;RUSB;grade 1        pulses full and equal                                   Right groin ecchymoses, no hematoma no bruit, bandage removed    GI:    obese      Extremities and Muscular Skeletal:  no edema;no spinal abnormalities noted;normal muscle strength and tone              Neurological:  no gross motor deficits   Resting tremor    Psych:  affect appropriate, oriented to time, person and place        CC  Nabeel Allred MD  [address]  [facility] [place]  [place] " [address]                  Thank you for allowing me to participate in the care of your patient.      Sincerely,     Nabeel Allred MD     [facility]  cc:   Nabeel Allred MD  [address] [place] [id number]

## 2024-08-28 RX ORDER — EZETIMIBE 10 MG/1
10 TABLET ORAL DAILY
Qty: 90 TABLET | Refills: 4 | Status: SHIPPED | OUTPATIENT
Start: 2024-08-28

## 2024-09-20 ENCOUNTER — TRANSFERRED RECORDS (OUTPATIENT)
Dept: HEALTH INFORMATION MANAGEMENT | Facility: CLINIC | Age: 84
End: 2024-09-20

## 2024-10-19 NOTE — TELEPHONE ENCOUNTER
I would start losartan  25 mgs/daily  
Patient cad called, left message that [facility] and due to a chronic cough his Lisinopril was stopped 7-25-17. Yesterday his   BP was 130/ . Has not checked his BP today. VA recommended to check BP at home for the next 10 days, monitor cough and call them with an update. A new medication may add at that time.  Patient wondering if he should have a new BP medication started now or to wait? Patient feeling fine other than the cough.   Will message Dr. Allred.     
RN filled out VA request for medications form and faxed to [phone] along with all required info (last OV, new rx, labs, etc.)  
Spoke with patient who requests prescription for Losartan 25 mg one tablet daily [facility] to [facility] , as that is where he obtains his medications.   
Normal for race

## 2024-11-05 ENCOUNTER — DOCUMENTATION ONLY (OUTPATIENT)
Dept: CARDIOLOGY | Facility: CLINIC | Age: 84
End: 2024-11-05
Payer: COMMERCIAL

## 2024-11-05 NOTE — PROGRESS NOTES
Received a fax from [employer] for PPM MRI compatibility. Form completed. Form signed by Dr. Irby. Faxed back to [employer] at [phone].     Device and leads are MRI conditional.

## 2024-11-18 NOTE — PROGRESS NOTES
[facility] Call Center    Phone Message    May a detailed message be left on voicemail: yes     Reason for Call: Other: Jodi called from the [place] [facility] stating that [facility] claims to still not have received the fax. Jodi is requesting it be emailed to her so she can facilitate sending it over to [facility]. Please review.      Email: [email or web address]  Phone: [phone] ext [phone]    Action Taken: Message routed to:  Other: [facility]    Travel Screening: Not Applicable     Date of Service:

## 2024-11-18 NOTE — PROGRESS NOTES
Emailed to [email or web address]    [facility] so Jodi is aware. Asked that she call back if email is not received within the next 30min.    TAYE MONTANEZ

## 2024-11-25 ENCOUNTER — ANCILLARY PROCEDURE (OUTPATIENT)
Dept: CARDIOLOGY | Facility: CLINIC | Age: 84
End: 2024-11-25
Attending: INTERNAL MEDICINE
Payer: COMMERCIAL

## 2024-11-25 DIAGNOSIS — I49.5 SICK SINUS SYNDROME (H): ICD-10-CM

## 2024-11-25 DIAGNOSIS — I25.10 CORONARY ARTERY DISEASE INVOLVING NATIVE CORONARY ARTERY OF NATIVE HEART WITHOUT ANGINA PECTORIS: ICD-10-CM

## 2024-11-25 DIAGNOSIS — Z95.0 CARDIAC PACEMAKER IN SITU: ICD-10-CM

## 2024-11-25 PROCEDURE — 93294 REM INTERROG EVL PM/LDLS PM: CPT | Performed by: INTERNAL MEDICINE

## 2024-11-25 PROCEDURE — 93296 REM INTERROG EVL PM/IDS: CPT | Performed by: INTERNAL MEDICINE

## 2024-11-26 LAB
MDC_IDC_EPISODE_DTM: NORMAL
MDC_IDC_EPISODE_DURATION: 10 S
MDC_IDC_EPISODE_DURATION: 14 S
MDC_IDC_EPISODE_DURATION: 8 S
MDC_IDC_EPISODE_ID: NORMAL
MDC_IDC_EPISODE_TYPE: NORMAL
MDC_IDC_LEAD_CONNECTION_STATUS: NORMAL
MDC_IDC_LEAD_CONNECTION_STATUS: NORMAL
MDC_IDC_LEAD_IMPLANT_DT: NORMAL
MDC_IDC_LEAD_IMPLANT_DT: NORMAL
MDC_IDC_LEAD_LOCATION: NORMAL
MDC_IDC_LEAD_LOCATION: NORMAL
MDC_IDC_LEAD_LOCATION_DETAIL_1: NORMAL
MDC_IDC_LEAD_LOCATION_DETAIL_1: NORMAL
MDC_IDC_LEAD_MFG: NORMAL
MDC_IDC_LEAD_MFG: NORMAL
MDC_IDC_LEAD_MODEL: NORMAL
MDC_IDC_LEAD_MODEL: NORMAL
MDC_IDC_LEAD_POLARITY_TYPE: NORMAL
MDC_IDC_LEAD_POLARITY_TYPE: NORMAL
MDC_IDC_LEAD_SERIAL: NORMAL
MDC_IDC_LEAD_SERIAL: NORMAL
MDC_IDC_MSMT_BATTERY_DTM: NORMAL
MDC_IDC_MSMT_BATTERY_REMAINING_LONGEVITY: 80 MO
MDC_IDC_MSMT_BATTERY_REMAINING_PERCENTAGE: 65 %
MDC_IDC_MSMT_BATTERY_RRT_TRIGGER: NORMAL
MDC_IDC_MSMT_BATTERY_STATUS: NORMAL
MDC_IDC_MSMT_BATTERY_VOLTAGE: 2.99 V
MDC_IDC_MSMT_LEADCHNL_RA_IMPEDANCE_VALUE: 390 OHM
MDC_IDC_MSMT_LEADCHNL_RA_LEAD_CHANNEL_STATUS: NORMAL
MDC_IDC_MSMT_LEADCHNL_RA_PACING_THRESHOLD_AMPLITUDE: 0.5 V
MDC_IDC_MSMT_LEADCHNL_RA_PACING_THRESHOLD_PULSEWIDTH: 0.4 MS
MDC_IDC_MSMT_LEADCHNL_RA_SENSING_INTR_AMPL: 3.2 MV
MDC_IDC_MSMT_LEADCHNL_RV_IMPEDANCE_VALUE: 490 OHM
MDC_IDC_MSMT_LEADCHNL_RV_LEAD_CHANNEL_STATUS: NORMAL
MDC_IDC_MSMT_LEADCHNL_RV_PACING_THRESHOLD_AMPLITUDE: 0.75 V
MDC_IDC_MSMT_LEADCHNL_RV_PACING_THRESHOLD_PULSEWIDTH: 0.4 MS
MDC_IDC_MSMT_LEADCHNL_RV_SENSING_INTR_AMPL: 9.1 MV
MDC_IDC_PG_IMPLANT_DTM: NORMAL
MDC_IDC_PG_MFG: NORMAL
MDC_IDC_PG_MODEL: NORMAL
MDC_IDC_PG_SERIAL: NORMAL
MDC_IDC_PG_TYPE: NORMAL
MDC_IDC_SESS_CLINIC_NAME: NORMAL
MDC_IDC_SESS_DTM: NORMAL
MDC_IDC_SESS_REPROGRAMMED: NO
MDC_IDC_SESS_TYPE: NORMAL
MDC_IDC_SET_BRADY_AT_MODE_SWITCH_MODE: NORMAL
MDC_IDC_SET_BRADY_AT_MODE_SWITCH_RATE: 180 {BEATS}/MIN
MDC_IDC_SET_BRADY_LOWRATE: 60 {BEATS}/MIN
MDC_IDC_SET_BRADY_MAX_SENSOR_RATE: 130 {BEATS}/MIN
MDC_IDC_SET_BRADY_MAX_TRACKING_RATE: 120 {BEATS}/MIN
MDC_IDC_SET_BRADY_MODE: NORMAL
MDC_IDC_SET_BRADY_PAV_DELAY_LOW: 200 MS
MDC_IDC_SET_BRADY_SAV_DELAY_LOW: 170 MS
MDC_IDC_SET_LEADCHNL_RA_PACING_AMPLITUDE: 1.5 V
MDC_IDC_SET_LEADCHNL_RA_PACING_ANODE_ELECTRODE_1: NORMAL
MDC_IDC_SET_LEADCHNL_RA_PACING_ANODE_LOCATION_1: NORMAL
MDC_IDC_SET_LEADCHNL_RA_PACING_CAPTURE_MODE: NORMAL
MDC_IDC_SET_LEADCHNL_RA_PACING_CATHODE_ELECTRODE_1: NORMAL
MDC_IDC_SET_LEADCHNL_RA_PACING_CATHODE_LOCATION_1: NORMAL
MDC_IDC_SET_LEADCHNL_RA_PACING_POLARITY: NORMAL
MDC_IDC_SET_LEADCHNL_RA_PACING_PULSEWIDTH: 0.4 MS
MDC_IDC_SET_LEADCHNL_RA_SENSING_ADAPTATION_MODE: NORMAL
MDC_IDC_SET_LEADCHNL_RA_SENSING_ANODE_ELECTRODE_1: NORMAL
MDC_IDC_SET_LEADCHNL_RA_SENSING_ANODE_LOCATION_1: NORMAL
MDC_IDC_SET_LEADCHNL_RA_SENSING_CATHODE_ELECTRODE_1: NORMAL
MDC_IDC_SET_LEADCHNL_RA_SENSING_CATHODE_LOCATION_1: NORMAL
MDC_IDC_SET_LEADCHNL_RA_SENSING_POLARITY: NORMAL
MDC_IDC_SET_LEADCHNL_RA_SENSING_SENSITIVITY: 0.3 MV
MDC_IDC_SET_LEADCHNL_RV_PACING_AMPLITUDE: 1 V
MDC_IDC_SET_LEADCHNL_RV_PACING_ANODE_ELECTRODE_1: NORMAL
MDC_IDC_SET_LEADCHNL_RV_PACING_ANODE_LOCATION_1: NORMAL
MDC_IDC_SET_LEADCHNL_RV_PACING_CAPTURE_MODE: NORMAL
MDC_IDC_SET_LEADCHNL_RV_PACING_CATHODE_ELECTRODE_1: NORMAL
MDC_IDC_SET_LEADCHNL_RV_PACING_CATHODE_LOCATION_1: NORMAL
MDC_IDC_SET_LEADCHNL_RV_PACING_POLARITY: NORMAL
MDC_IDC_SET_LEADCHNL_RV_PACING_PULSEWIDTH: 0.4 MS
MDC_IDC_SET_LEADCHNL_RV_SENSING_ADAPTATION_MODE: NORMAL
MDC_IDC_SET_LEADCHNL_RV_SENSING_ANODE_ELECTRODE_1: NORMAL
MDC_IDC_SET_LEADCHNL_RV_SENSING_ANODE_LOCATION_1: NORMAL
MDC_IDC_SET_LEADCHNL_RV_SENSING_CATHODE_ELECTRODE_1: NORMAL
MDC_IDC_SET_LEADCHNL_RV_SENSING_CATHODE_LOCATION_1: NORMAL
MDC_IDC_SET_LEADCHNL_RV_SENSING_POLARITY: NORMAL
MDC_IDC_SET_LEADCHNL_RV_SENSING_SENSITIVITY: 0.5 MV
MDC_IDC_STAT_AT_BURDEN_PERCENT: 1 %
MDC_IDC_STAT_AT_DTM_END: NORMAL
MDC_IDC_STAT_AT_DTM_START: NORMAL
MDC_IDC_STAT_AT_MODE_SW_COUNT: 3
MDC_IDC_STAT_AT_MODE_SW_COUNT_PER_DAY: 0
MDC_IDC_STAT_AT_MODE_SW_MAX_DURATION: 14 S
MDC_IDC_STAT_AT_MODE_SW_PERCENT_TIME: 1 %
MDC_IDC_STAT_BRADY_AP_VP_PERCENT: 56 %
MDC_IDC_STAT_BRADY_AP_VS_PERCENT: 29 %
MDC_IDC_STAT_BRADY_AS_VP_PERCENT: 1.3 %
MDC_IDC_STAT_BRADY_AS_VS_PERCENT: 12 %
MDC_IDC_STAT_BRADY_DTM_END: NORMAL
MDC_IDC_STAT_BRADY_DTM_START: NORMAL
MDC_IDC_STAT_BRADY_RA_PERCENT_PACED: 82 %
MDC_IDC_STAT_BRADY_RV_PERCENT_PACED: 57 %
MDC_IDC_STAT_CRT_DTM_END: NORMAL
MDC_IDC_STAT_CRT_DTM_START: NORMAL
MDC_IDC_STAT_HEART_RATE_ATRIAL_MAX: 320 {BEATS}/MIN
MDC_IDC_STAT_HEART_RATE_ATRIAL_MEAN: 69 {BEATS}/MIN
MDC_IDC_STAT_HEART_RATE_ATRIAL_MIN: 50 {BEATS}/MIN
MDC_IDC_STAT_HEART_RATE_DTM_END: NORMAL
MDC_IDC_STAT_HEART_RATE_DTM_START: NORMAL
MDC_IDC_STAT_HEART_RATE_VENTRICULAR_MAX: 290 {BEATS}/MIN
MDC_IDC_STAT_HEART_RATE_VENTRICULAR_MEAN: 69 {BEATS}/MIN
MDC_IDC_STAT_HEART_RATE_VENTRICULAR_MIN: 40 {BEATS}/MIN

## 2025-01-13 ENCOUNTER — TELEPHONE (OUTPATIENT)
Dept: CARDIOLOGY | Facility: CLINIC | Age: 85
End: 2025-01-13
Payer: COMMERCIAL

## 2025-01-13 NOTE — TELEPHONE ENCOUNTER
Patient's spouse left voicemail on team 2 nurse line regarding fax for PPM MRI compatibility. Per chart notes, this was completed on 11/5/24. Patient spouse are reporting this was not received and a new form has been sent. (Nothing has come through to Dr. Allred's fax). Would appreciate call back at [phone] to discuss. Message routed to device team.

## 2025-01-15 ENCOUNTER — DOCUMENTATION ONLY (OUTPATIENT)
Dept: CARDIOLOGY | Facility: CLINIC | Age: 85
End: 2025-01-15
Payer: COMMERCIAL

## 2025-01-15 NOTE — TELEPHONE ENCOUNTER
MRI checklist received from [facility]. I spoke with Christina and she confirmed that they are hopeful to have patient's MRI completed there but are unsure if they have an opensided MRI scanner. I let her know that I will send patient's device information to them so that they have the information needed in case they do decide to move forward with the MRI.  Faxed note above (MR compatibility note) alongside patient's most recent device check. Sent to both fax numbers provided.    [facility]  Ph: [phone] ext [phone]  F: [phone]  TAYE RN

## 2025-01-15 NOTE — TELEPHONE ENCOUNTER
Is the implanted device safe for MRI Exam? Yes  Is this device 3T compatible? Yes  Device Type: Pacemaker      Device Information: [employer], [employer] Device: [employer] MRI      Cardiology Orders for Device Programming     -- Yes -- The patient has a MRI conditional pulse generator and leads from the same     -- N/A -- The pulse generator and leads have been implanted for at least 6 weeks. (Does not apply to [employer] devices)    -- Yes-- The device is implanted in the right or left pectoral region    -- Correct, none known -- There are not any additional active cardiac devices, abandoned leads, lead extenders or adapters    -- Yes -- Lead impedances are within the normal range per  guidelines.    -- Yes -- If the patient is pacemaker dependent the thresholds are less than or equal to 4.0 V @ 1.0 ms    -- Yes -- RA and RV leads are programmed to bipolar pacing operation or pacing off. If no, MRI TO CONTACT THE DEVICE REP FOR PROGRAMMING. (Sensing can be bipolar or unipolar).     -- N/A -- LV lead programmed to bipolar pacing operation or pacing off. If no, CONTACT THE DEVICE REP TO DETERMINE IF REPROGRAMMING WILL NEED TO BE COMPLETED DAY OF PROCEDURE.       Date of last Remote Device check: 11/25/24  Results of last Device check:       Device programming during the scan guidelines   Pacing Mode (check one): DOO  Pacing Rate: 60  bpm or 20bpm > intrinsic rhythm, not to exceed 120bpm    If remote reprogramming is applicable, please contact the Rep to assist    TARIK Barajas

## 2025-01-30 ENCOUNTER — DOCUMENTATION ONLY (OUTPATIENT)
Dept: CARDIOLOGY | Facility: CLINIC | Age: 85
End: 2025-01-30
Payer: COMMERCIAL

## 2025-02-11 ENCOUNTER — TELEPHONE (OUTPATIENT)
Dept: CARDIOLOGY | Facility: CLINIC | Age: 85
End: 2025-02-11
Payer: COMMERCIAL

## 2025-02-14 ENCOUNTER — TELEPHONE (OUTPATIENT)
Dept: CARDIOLOGY | Facility: CLINIC | Age: 85
End: 2025-02-14
Payer: COMMERCIAL

## 2025-02-14 NOTE — TELEPHONE ENCOUNTER
Received a fax from [facility] asking for clearance form for MRI. There are already 4 encounters in the chart from the last month about this MRI. Unsure if this is the same fax that has already been filled out and sent, or if this is a new fax.                   Device/leads are MRI conditional:          Per implant note, no lead extenders or adapters:        Lead measurements and battery:       CXR shows no abandoned leads, PPM in left pectoral region:            Underlying rhythm on 5/1/2024: SB 45-55 bpm with 1st degree AVB.   Pacing mode ordered for MRI: DOO 80       Form complete, set on Dr. Irby's desk (DANIEL MD of the day) for signature.

## 2025-02-17 NOTE — TELEPHONE ENCOUNTER
Form faxed to [facility]. (Copy in Fax  case there are any issues).

## 2025-03-03 ENCOUNTER — ANCILLARY PROCEDURE (OUTPATIENT)
Dept: CARDIOLOGY | Facility: CLINIC | Age: 85
End: 2025-03-03
Attending: INTERNAL MEDICINE
Payer: COMMERCIAL

## 2025-03-03 DIAGNOSIS — Z95.0 CARDIAC PACEMAKER IN SITU: ICD-10-CM

## 2025-03-03 DIAGNOSIS — I49.5 SICK SINUS SYNDROME (H): ICD-10-CM

## 2025-03-03 DIAGNOSIS — I25.10 CORONARY ARTERY DISEASE INVOLVING NATIVE CORONARY ARTERY OF NATIVE HEART WITHOUT ANGINA PECTORIS: ICD-10-CM

## 2025-03-03 LAB
MDC_IDC_LEAD_CONNECTION_STATUS: NORMAL
MDC_IDC_LEAD_CONNECTION_STATUS: NORMAL
MDC_IDC_LEAD_IMPLANT_DT: NORMAL
MDC_IDC_LEAD_IMPLANT_DT: NORMAL
MDC_IDC_LEAD_LOCATION: NORMAL
MDC_IDC_LEAD_LOCATION: NORMAL
MDC_IDC_LEAD_LOCATION_DETAIL_1: NORMAL
MDC_IDC_LEAD_LOCATION_DETAIL_1: NORMAL
MDC_IDC_LEAD_MFG: NORMAL
MDC_IDC_LEAD_MFG: NORMAL
MDC_IDC_LEAD_MODEL: NORMAL
MDC_IDC_LEAD_MODEL: NORMAL
MDC_IDC_LEAD_POLARITY_TYPE: NORMAL
MDC_IDC_LEAD_POLARITY_TYPE: NORMAL
MDC_IDC_LEAD_SERIAL: NORMAL
MDC_IDC_LEAD_SERIAL: NORMAL
MDC_IDC_MSMT_BATTERY_DTM: NORMAL
MDC_IDC_MSMT_BATTERY_REMAINING_LONGEVITY: 76 MO
MDC_IDC_MSMT_BATTERY_REMAINING_PERCENTAGE: 62 %
MDC_IDC_MSMT_BATTERY_RRT_TRIGGER: NORMAL
MDC_IDC_MSMT_BATTERY_STATUS: NORMAL
MDC_IDC_MSMT_BATTERY_VOLTAGE: 2.99 V
MDC_IDC_MSMT_LEADCHNL_RA_IMPEDANCE_VALUE: 390 OHM
MDC_IDC_MSMT_LEADCHNL_RA_LEAD_CHANNEL_STATUS: NORMAL
MDC_IDC_MSMT_LEADCHNL_RA_PACING_THRESHOLD_AMPLITUDE: 0.62 V
MDC_IDC_MSMT_LEADCHNL_RA_PACING_THRESHOLD_PULSEWIDTH: 0.4 MS
MDC_IDC_MSMT_LEADCHNL_RA_SENSING_INTR_AMPL: 2.7 MV
MDC_IDC_MSMT_LEADCHNL_RV_IMPEDANCE_VALUE: 490 OHM
MDC_IDC_MSMT_LEADCHNL_RV_LEAD_CHANNEL_STATUS: NORMAL
MDC_IDC_MSMT_LEADCHNL_RV_PACING_THRESHOLD_AMPLITUDE: 0.62 V
MDC_IDC_MSMT_LEADCHNL_RV_PACING_THRESHOLD_PULSEWIDTH: 0.4 MS
MDC_IDC_MSMT_LEADCHNL_RV_SENSING_INTR_AMPL: 9.4 MV
MDC_IDC_PG_IMPLANT_DTM: NORMAL
MDC_IDC_PG_MFG: NORMAL
MDC_IDC_PG_MODEL: NORMAL
MDC_IDC_PG_SERIAL: NORMAL
MDC_IDC_PG_TYPE: NORMAL
MDC_IDC_SESS_CLINIC_NAME: NORMAL
MDC_IDC_SESS_DTM: NORMAL
MDC_IDC_SESS_REPROGRAMMED: NO
MDC_IDC_SESS_TYPE: NORMAL
MDC_IDC_SET_BRADY_AT_MODE_SWITCH_MODE: NORMAL
MDC_IDC_SET_BRADY_AT_MODE_SWITCH_RATE: 180 {BEATS}/MIN
MDC_IDC_SET_BRADY_LOWRATE: 60 {BEATS}/MIN
MDC_IDC_SET_BRADY_MAX_SENSOR_RATE: 130 {BEATS}/MIN
MDC_IDC_SET_BRADY_MAX_TRACKING_RATE: 120 {BEATS}/MIN
MDC_IDC_SET_BRADY_MODE: NORMAL
MDC_IDC_SET_BRADY_PAV_DELAY_LOW: 200 MS
MDC_IDC_SET_BRADY_SAV_DELAY_LOW: 170 MS
MDC_IDC_SET_LEADCHNL_RA_PACING_AMPLITUDE: 1.62
MDC_IDC_SET_LEADCHNL_RA_PACING_ANODE_ELECTRODE_1: NORMAL
MDC_IDC_SET_LEADCHNL_RA_PACING_ANODE_LOCATION_1: NORMAL
MDC_IDC_SET_LEADCHNL_RA_PACING_CAPTURE_MODE: NORMAL
MDC_IDC_SET_LEADCHNL_RA_PACING_CATHODE_ELECTRODE_1: NORMAL
MDC_IDC_SET_LEADCHNL_RA_PACING_CATHODE_LOCATION_1: NORMAL
MDC_IDC_SET_LEADCHNL_RA_PACING_POLARITY: NORMAL
MDC_IDC_SET_LEADCHNL_RA_PACING_PULSEWIDTH: 0.4 MS
MDC_IDC_SET_LEADCHNL_RA_SENSING_ADAPTATION_MODE: NORMAL
MDC_IDC_SET_LEADCHNL_RA_SENSING_ANODE_ELECTRODE_1: NORMAL
MDC_IDC_SET_LEADCHNL_RA_SENSING_ANODE_LOCATION_1: NORMAL
MDC_IDC_SET_LEADCHNL_RA_SENSING_CATHODE_ELECTRODE_1: NORMAL
MDC_IDC_SET_LEADCHNL_RA_SENSING_CATHODE_LOCATION_1: NORMAL
MDC_IDC_SET_LEADCHNL_RA_SENSING_POLARITY: NORMAL
MDC_IDC_SET_LEADCHNL_RA_SENSING_SENSITIVITY: 0.3 MV
MDC_IDC_SET_LEADCHNL_RV_PACING_AMPLITUDE: 0.88
MDC_IDC_SET_LEADCHNL_RV_PACING_ANODE_ELECTRODE_1: NORMAL
MDC_IDC_SET_LEADCHNL_RV_PACING_ANODE_LOCATION_1: NORMAL
MDC_IDC_SET_LEADCHNL_RV_PACING_CAPTURE_MODE: NORMAL
MDC_IDC_SET_LEADCHNL_RV_PACING_CATHODE_ELECTRODE_1: NORMAL
MDC_IDC_SET_LEADCHNL_RV_PACING_CATHODE_LOCATION_1: NORMAL
MDC_IDC_SET_LEADCHNL_RV_PACING_POLARITY: NORMAL
MDC_IDC_SET_LEADCHNL_RV_PACING_PULSEWIDTH: 0.4 MS
MDC_IDC_SET_LEADCHNL_RV_SENSING_ADAPTATION_MODE: NORMAL
MDC_IDC_SET_LEADCHNL_RV_SENSING_ANODE_ELECTRODE_1: NORMAL
MDC_IDC_SET_LEADCHNL_RV_SENSING_ANODE_LOCATION_1: NORMAL
MDC_IDC_SET_LEADCHNL_RV_SENSING_CATHODE_ELECTRODE_1: NORMAL
MDC_IDC_SET_LEADCHNL_RV_SENSING_CATHODE_LOCATION_1: NORMAL
MDC_IDC_SET_LEADCHNL_RV_SENSING_POLARITY: NORMAL
MDC_IDC_SET_LEADCHNL_RV_SENSING_SENSITIVITY: 0.5 MV
MDC_IDC_STAT_AT_BURDEN_PERCENT: 0 %
MDC_IDC_STAT_AT_DTM_END: NORMAL
MDC_IDC_STAT_AT_DTM_START: NORMAL
MDC_IDC_STAT_AT_MODE_SW_COUNT: 0
MDC_IDC_STAT_AT_MODE_SW_COUNT_PER_DAY: 0
MDC_IDC_STAT_AT_MODE_SW_PERCENT_TIME: 0 %
MDC_IDC_STAT_BRADY_AP_VP_PERCENT: 41 %
MDC_IDC_STAT_BRADY_AP_VS_PERCENT: 35 %
MDC_IDC_STAT_BRADY_AS_VP_PERCENT: 2.7 %
MDC_IDC_STAT_BRADY_AS_VS_PERCENT: 17 %
MDC_IDC_STAT_BRADY_DTM_END: NORMAL
MDC_IDC_STAT_BRADY_DTM_START: NORMAL
MDC_IDC_STAT_BRADY_RA_PERCENT_PACED: 68 %
MDC_IDC_STAT_BRADY_RV_PERCENT_PACED: 43 %
MDC_IDC_STAT_CRT_DTM_END: NORMAL
MDC_IDC_STAT_CRT_DTM_START: NORMAL
MDC_IDC_STAT_HEART_RATE_ATRIAL_MAX: 320 {BEATS}/MIN
MDC_IDC_STAT_HEART_RATE_ATRIAL_MEAN: 70 {BEATS}/MIN
MDC_IDC_STAT_HEART_RATE_ATRIAL_MIN: 50 {BEATS}/MIN
MDC_IDC_STAT_HEART_RATE_DTM_END: NORMAL
MDC_IDC_STAT_HEART_RATE_DTM_START: NORMAL
MDC_IDC_STAT_HEART_RATE_VENTRICULAR_MAX: 280 {BEATS}/MIN
MDC_IDC_STAT_HEART_RATE_VENTRICULAR_MEAN: 71 {BEATS}/MIN
MDC_IDC_STAT_HEART_RATE_VENTRICULAR_MIN: 40 {BEATS}/MIN

## 2025-03-03 PROCEDURE — 93296 REM INTERROG EVL PM/IDS: CPT | Performed by: INTERNAL MEDICINE

## 2025-03-03 PROCEDURE — 93294 REM INTERROG EVL PM/LDLS PM: CPT | Performed by: INTERNAL MEDICINE

## 2025-03-20 ENCOUNTER — MYC MEDICAL ADVICE (OUTPATIENT)
Dept: CARDIOLOGY | Facility: CLINIC | Age: 85
End: 2025-03-20
Payer: COMMERCIAL

## 2025-03-24 NOTE — TELEPHONE ENCOUNTER
My chart message from patient:  Confirm my appointment for April 9 at 10:00  with Dr Allred in [place].       =============  Mona, Mr. Bruce,     Yes, you are scheduled to see Dr. Allred at [facility] on 4/9/2025 @ 10:00 AM.     You are also due to have fasting lab work before the visit and there is not a lab appointment for you in the [employer] system. That is what the reminder was for.    Please call scheduling at [phone] to find an appointment for the labs.     We can see that [facility] the [facility] recently, but the records will not load in our system. You may need to update your release with the [facility] to share that information with us.    Thank you  Team 2 R.N.s  [phone]

## 2025-03-25 NOTE — TELEPHONE ENCOUNTER
Update from patient:  Tony will be having lab work done at [facility] few days before his appt with Dr Allred and will bring results to that appointment. He usually does that and has been okay. We have copy of what Dr Allred requests. Thank you for checking with us.

## 2025-04-03 ENCOUNTER — TELEPHONE (OUTPATIENT)
Dept: CARDIOLOGY | Facility: CLINIC | Age: 85
End: 2025-04-03
Payer: COMMERCIAL

## 2025-04-03 ENCOUNTER — MEDICAL CORRESPONDENCE (OUTPATIENT)
Dept: HEALTH INFORMATION MANAGEMENT | Facility: CLINIC | Age: 85
End: 2025-04-03
Payer: COMMERCIAL

## 2025-04-03 NOTE — TELEPHONE ENCOUNTER
Appointment with Dr. Allred is scheduled 4/9/25. Patient and spouse report patient is [facility] the [facility] and would like to have labs drawn at this time at the [facility]. [facility] called directly and writer was provided with fax # of [phone]. Lab order for FLP, ALT and BMP faxed to number provided and screen shot of order form sent to patient via [employer] per request.

## 2025-04-09 ENCOUNTER — OFFICE VISIT (OUTPATIENT)
Dept: CARDIOLOGY | Facility: CLINIC | Age: 85
End: 2025-04-09
Payer: COMMERCIAL

## 2025-04-09 VITALS
OXYGEN SATURATION: 95 % | HEART RATE: 64 BPM | DIASTOLIC BLOOD PRESSURE: 68 MMHG | SYSTOLIC BLOOD PRESSURE: 118 MMHG | HEIGHT: 68 IN | WEIGHT: 230.6 LBS | BODY MASS INDEX: 34.95 KG/M2

## 2025-04-09 DIAGNOSIS — I10 ESSENTIAL HYPERTENSION, BENIGN: ICD-10-CM

## 2025-04-09 DIAGNOSIS — E66.09 CLASS 2 OBESITY DUE TO EXCESS CALORIES WITHOUT SERIOUS COMORBIDITY WITH BODY MASS INDEX (BMI) OF 35.0 TO 35.9 IN ADULT: ICD-10-CM

## 2025-04-09 DIAGNOSIS — G25.0 ESSENTIAL TREMOR: ICD-10-CM

## 2025-04-09 DIAGNOSIS — I48.0 PAF (PAROXYSMAL ATRIAL FIBRILLATION) (H): ICD-10-CM

## 2025-04-09 DIAGNOSIS — R06.09 DOE (DYSPNEA ON EXERTION): ICD-10-CM

## 2025-04-09 DIAGNOSIS — I49.5 SICK SINUS SYNDROME (H): ICD-10-CM

## 2025-04-09 DIAGNOSIS — E78.00 PURE HYPERCHOLESTEROLEMIA: Chronic | ICD-10-CM

## 2025-04-09 DIAGNOSIS — N18.31 CHRONIC RENAL FAILURE, STAGE 3A (H): ICD-10-CM

## 2025-04-09 DIAGNOSIS — G47.33 OSA (OBSTRUCTIVE SLEEP APNEA): Chronic | ICD-10-CM

## 2025-04-09 DIAGNOSIS — E66.812 CLASS 2 OBESITY DUE TO EXCESS CALORIES WITHOUT SERIOUS COMORBIDITY WITH BODY MASS INDEX (BMI) OF 35.0 TO 35.9 IN ADULT: ICD-10-CM

## 2025-04-09 DIAGNOSIS — I25.10 CORONARY ARTERY DISEASE INVOLVING NATIVE CORONARY ARTERY OF NATIVE HEART WITHOUT ANGINA PECTORIS: Primary | ICD-10-CM

## 2025-04-09 DIAGNOSIS — Z95.0 CARDIAC PACEMAKER IN SITU: ICD-10-CM

## 2025-04-09 RX ORDER — PREGABALIN 75 MG/1
75 CAPSULE ORAL
COMMUNITY
Start: 2025-03-07

## 2025-04-09 NOTE — PROGRESS NOTES
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    Sick sinus syndrome with a pacemaker placed in [place] 2022.  Pacemaker has identified paroxysmal atrial fibrillation [facility] to edoxaban 60 mg daily.  He also has obstructive sleep apnea for which he does not use a CPAP mask.     He had COVID in 03/2020.   Tony thought he had worsening dyspnea on exertion.  He had no chest, arm, neck, jaw or shoulder discomfort.  His activity is mostly limited by debilitating back pain.  [facility] upon return to [place] demonstrated a small reversible defect in the basilar to mid inferior wall.  Ejection fraction was 63% without focal wall motion abnormalities.  BNP was 174.       Echocardiogram  09/2021 demonstrated ejection fraction of 60%-65% without focal wall motion abnormalities, no significant valvular pathology and normal pulmonary pressures.     Because of his abnormal stress test, we took him to the Cath Lab 2022, which demonstrated what appeared to be a chronically occluded right coronary artery filled by left to right collaterals.  He did have a distal left main stenosis in the 25% range.  The proximal LAD appeared to have a stenosis in the 50% -60% range.  The iFR of the left anterior descending artery was 0.85.  In an attempt to perform angioplasty of the proximal ostial LAD, I was unable to advance a balloon and I decided we would set up as a CHIP case as it was heavily calcified.     We added Imdur 15 mg to his regimen and Plavix and started him in Cardiac Rehab.  When he returned for followup, he thought he was feeling  significantly better and did not want to follow up with the CHIP team.      May 2024 I sent him to the Cath Lab where Chad Blood performed successful intervention with Impella CP support undergoing rotational atherectomy and IVUS directed stent deployment of his left main and proximal left anterior descending artery.     He notes his dyspnea is improved but his activity is still mostly limited by orthopedic issues.  He notes in the grocery store when pushing a shopping cart he can walk much better.     He follows with neurology for his neuropathy and recent adjustments in his medications have helped with his daytime somnolence and sleepiness.    He recently purchased a pedaling apparatus which unfortunately he is not using yet.     Interrogation of device shows he A paces 68 per time of the time he paces in the ventricle 43% of the time.  He had no atrial or ventricular arrhythmias.  .     Preprocedure echocardiogram demonstrated ejection fraction of 60 to 65% without any valvular pathology.  IVC was described as normal.  Previous N-terminal proBNP's have been normal.  [facility]     ASSESSMENT AND PLAN:   Tony has no clear anginal symptoms and his dyspnea appears to be improved.  Activity level is limited by his orthopedic issues.  As I told him I think his dyspnea on exertion is multifactorial to what ever degree was contributed by the stenosis has now been alleviated and fortunately did not result in as much improvement as he hoped for.  I told him I think a significant component is age, deconditioning, orthopedic and obesity.  I recommended that he exercise regularly.      He is almost 1 year out and he can stop his clopidogrel and continue on edoxaban only.     Previous adjustments in his pacemaker settings have also not resulted in a significant improvement     I think Tony's heart failure is very well compensated     Most recent fasting [facility] from from 2 days  ago shows total cholesterol of 84, HDL 36, LDL 35.  Triglycerides are blank.      Blood pressure is well controlled today at 118/68 with a pulse of 64     Weight is 230 pounds,.  Body mass index is 35.02.  His weight is down about 10 pounds from last May.  He states the [facility] is considering putting him on Ozempic and wants to know my opinion.  I recommended that he go on Ozempic.  If he does lose significant amount of weight his antihypertensive medicines will need to be adjusted.     Most recent hemoglobin is down a bit at 11.9.  He runs a chronic anemia.  As stated I am discontinuing clopidogrel at this time     I will have him follow-up with Dr. Chad Blood in 6 months.  If he should have any problems would be glad to see him sooner.  Sincerely,                                Nabeel Allred MD [facility]       Today's clinic visit entailed:  Review of external notes as documented elsewhere in note  Review of the result(s) of each unique test - device interrogation, lab  Ordering of each unique test  Prescription drug management  40 minutes spent by me on the date of the encounter doing chart review, history and exam, documentation and further activities per the note  Provider  Link to [facility] Help Grid           Orders Placed This Encounter   Procedures    Follow-Up with Cardiology       Orders Placed This Encounter   Medications    pregabalin (LYRICA) 75 MG capsule     Sig: Take 75 mg by mouth.       Medications Discontinued During This Encounter   Medication Reason    clopidogrel (PLAVIX) 75 MG tablet     ezetimibe (ZETIA) 10 MG tablet          Encounter Diagnoses   Name Primary?    Coronary artery disease involving native coronary artery of native heart without angina pectoris Yes    Class 2 obesity due to excess calories without serious comorbidity with body mass index (BMI) of 35.0 to 35.9 in adult     Pure hypercholesterolemia     Essential hypertension, benign     LIZANDRO (obstructive sleep apnea)     Chronic renal  "failure, stage 3a (H)     ÁLVAREZ (dyspnea on exertion)     Sick sinus syndrome (H)     Cardiac pacemaker in situ     PAF (paroxysmal atrial fibrillation) (H)     Essential tremor        CURRENT MEDICATIONS:  Current Outpatient Medications   Medication Sig Dispense Refill    acetaminophen 500 MG CAPS 2 tablets twice a day      atorvastatin (LIPITOR) 80 MG tablet Take 1 tablet (80 mg) by mouth daily 90 tablet 4    Calcium Carb-Cholecalciferol (CALCIUM-VITAMIN D3) 250-125 MG-UNIT TABS Take 2 tablets by mouth 2 times daily      cyanocobalamin (VITAMIN B-12) 1000 MCG tablet Take by mouth daily      diclofenac (VOLTAREN) 1 % GEL topical gel Place onto the skin as needed for moderate pain      docusate sodium (COLACE) 100 MG capsule Take 100 mg by mouth 2 times daily as needed for constipation      edoxaban ANTICOAGULANT (SAVAYSA) 60 MG TABS tablet Take 1 tablet (60 mg) by mouth daily 90 tablet 4    Emollient (VANICREAM EX) APPLY THIN LAYER    TWICE A DAY FOR DRY SKIN      ezetimibe (ZETIA) 10 MG tablet Take 1 tablet (10 mg) by mouth daily. 90 tablet 4    finasteride (PROSCAR) 5 MG tablet Take 5 mg by mouth daily      furosemide (LASIX) 20 MG tablet Take 1 tablet (20 mg) by mouth daily 30 tablet 3    hydrocortisone 2.5 % cream Apply topically 2 times daily      isosorbide mononitrate (IMDUR) 30 MG 24 hr tablet Take 15 mg by mouth daily      lidocaine (LIDODERM) 5 % Patch Place 1 patch onto the skin      losartan (COZAAR) 50 MG tablet Take 1 tablet (50 mg) by mouth daily 90 tablet 4    nitroGLYcerin (NITROSTAT) 0.4 MG sublingual tablet One tablet under the tongue every 5 minutes if needed for chest pain. May repeat every 5 minutes for a maximum of 3 doses in 15 minutes\" 25 tablet 3    omega 3 1000 MG CAPS Take by mouth daily      omeprazole (PRILOSEC) 20 MG DR capsule Take 20 mg by mouth daily      OXcarbazepine (TRILEPTAL) 150 MG tablet Take 150 mg by mouth daily.      polyethylene glycol (MIRALAX/GLYCOLAX) Packet Take 1 " packet by mouth as needed for constipation      pregabalin (LYRICA) 75 MG capsule Take 75 mg by mouth.      primidone (MYSOLINE) 250 MG tablet Take 300 mg by mouth 2 times daily      propranolol ER (INDERAL LA) 120 MG 24 hr capsule Take 120 mg by mouth daily      psyllium 0.52 g capsule Take 1 capsule by mouth daily      sodium fluoride dental gel (PREVIDENT) 1.1 % GEL topical gel Apply to affected area At Bedtime      spironolactone (ALDACTONE) 25 MG tablet Take 1 tablet (25 mg) by mouth daily 90 tablet 3    tamsulosin (FLOMAX) 0.4 MG capsule Take 0.4 mg by mouth daily      terbinafine (LAMISIL) 1 % external cream Apply topically 2 times daily      traMADol (ULTRAM) 50 MG tablet Take 50 mg by mouth 2 times daily.      Vitamin D, Cholecalciferol, 1000 units CAPS Take by mouth daily         ALLERGIES     Allergies   Allergen Reactions    Lisinopril     Morphine      confusion       PAST MEDICAL HISTORY:  Past Medical History:   Diagnosis Date    Coronary atherosclerosis of unspecified type of vessel, native or graft 08/2001    cardiac cath 5/2022: unsuccessful attempt to LAD-medical management; cath 2005: GER to LAD; cath 2002: GER to RCA    Essential hypertension, benign     INTERMITTENT HYPERTENSION    Essential tremor     Generalized osteoarthrosis, unspecified site     Hyperlipidaemia     Hypertrophy of prostate without urinary obstruction and other lower urinary tract symptoms (LUTS)     BPH - Dr Pinto    Impotence of organic origin     Dr Pinto - Dr Allred    NONSPECIFIC MEDICAL HISTORY     CERVICAL/LUMBAR RADICULOPATHY    NONSPECIFIC MEDICAL HISTORY     SHOULDER IMPINGEMENT    NSTEMI (non-ST elevated myocardial infarction) (H)     inferior 2001    Obese     Other and unspecified hyperlipidemia     ?    Peripheral neuropathy     Sick sinus syndrome (H)     s/p [employer] pacemaker implanted March 2022 in [place]       PAST SURGICAL HISTORY:  Past Surgical History:   Procedure Laterality Date    CARDIAC NUC DANISHA  STRESS TEST NL  4/09    normal    COLONOSCOPY  6/06    normal - diverticulosis - dr adams    COLONOSCOPY  8/14/2012    Procedure: COLONOSCOPY;  COLONOSCOPY SCREENING/ 6 YEAR FOLLOW UP;  Surgeon: Rey Adams MD;  Location:  GI    CV CORONARY ANGIOGRAM N/A 5/25/2022    Procedure: Coronary Angiogram;  Surgeon: Nabeel Allred MD;  Location:  HEART CARDIAC CATH LAB    CV IMPELLA INSERTION N/A 5/9/2024    Procedure: Impella Insertion;  Surgeon: Con Blood MD;  Location:  HEART CARDIAC CATH LAB    CV IMPELLA REMOVAL N/A 5/9/2024    Procedure: Impella Removal;  Surgeon: Con Blood MD;  Location:  HEART CARDIAC CATH LAB    CV INSTANTANEOUS WAVE-FREE RATIO N/A 5/25/2022    Procedure: Instantaneous Wave-Free Ratio;  Surgeon: Nabeel Allred MD;  Location:  HEART CARDIAC CATH LAB    CV INTRAVASULAR ULTRASOUND N/A 5/9/2024    Procedure: Intravascular Ultrasound;  Surgeon: Con Blood MD;  Location:  HEART CARDIAC CATH LAB    CV LEFT HEART CATH N/A 5/25/2022    Procedure: Left Heart Catheterization;  Surgeon: Nabeel Allred MD;  Location:  HEART CARDIAC CATH LAB    CV LEFT HEART CATH N/A 5/9/2024    Procedure: Left Heart Catheterization;  Surgeon: Con Blood MD;  Location:  HEART CARDIAC CATH LAB    CV PCI N/A 5/25/2022    Procedure: Percutaneous Coronary Intervention;  Surgeon: Nabeel Allred MD;  Location:  HEART CARDIAC CATH LAB    CV PCI ATHERECTOMY ORBITAL N/A 5/9/2024    Procedure: Percutaneous Coronary Intervention - Atherectomy Rotational;  Surgeon: Con Blood MD;  Location:  HEART CARDIAC CATH LAB    CV PCI STENT DRUG ELUTING N/A 5/9/2024    Procedure: Percutaneous Coronary Intervention Stent;  Surgeon: Con Blood MD;  Location:  HEART CARDIAC CATH LAB    SURGICAL HISTORY OF -   1990    S/P CERVICAL DISCECTOMY x 2    SURGICAL HISTORY OF -   1990    S/P LUMBAR DISCECTOMY x 2    SURGICAL HISTORY  "OF - 1993    S/P LT CARPAL TUNNEL SURG/SHOULDER IMPINGEMENT    SURGICAL HISTORY OF -   8/01    S/P STENT OF RT CORONARY ARTERY    SURGICAL HISTORY OF -   9/05    Drug eluting stent to LAD    Z TOTAL KNEE ARTHROPLASTY  7/04    Knee Replacement, Total - LEFT Dr Regan    ZZHC REPAIR UMBILICAL CHIKA,5+Y/O,REDUC  9/04    dr dominique       FAMILY HISTORY:  Family History   Problem Relation Age of Onset    Respiratory Father         COPD    Cerebrovascular Disease Mother         CVA    C.A.D. Brother         CABG - after CABG x 3 - rheumatic fever as a child    Breast Cancer Sister     Cerebrovascular Disease Sister        SOCIAL HISTORY:  Social History     Socioeconomic History    Marital status:      Spouse name: paola    Number of children: 4    Years of education: 14    Highest education level: None   Tobacco Use    Smoking status: Never    Smokeless tobacco: Never   Substance and Sexual Activity    Alcohol use: No    Drug use: No    Sexual activity: Yes     Partners: Female   Other Topics Concern    Caffeine Concern Yes     Comment: 3-4 cups qd    Exercise Yes     Comment: limited    Seat Belt Yes       Review of Systems:  Skin:  not assessed       Eyes:  not assessed      ENT:  not assessed      Respiratory:  Negative       Cardiovascular:  Negative      Gastroenterology: not assessed      Genitourinary:  not assessed      Musculoskeletal:  not assessed      Neurologic:  not assessed      Psychiatric:  not assessed      Heme/Lymph/Imm:  not assessed      Endocrine:  not assessed        Physical Exam:  Vitals: /68 (BP Location: Right arm, Patient Position: Sitting, Cuff Size: Adult Regular)   Pulse 64   Ht 1.727 m (5' 8\")   Wt 104.6 kg (230 lb 9.6 oz)   SpO2 95%   BMI 35.06 kg/m      Constitutional:  cooperative, alert and oriented, well developed, well nourished, in no acute distress obese      Skin:  warm and dry to the touch, no apparent skin lesions or masses noted          Head:  " normocephalic, no masses or lesions        Eyes:  pupils equal and round, conjunctivae and lids unremarkable, sclera white, no xanthalasma, EOMS intact, no nystagmus        Lymph:      ENT:  no pallor or cyanosis, dentition good        Neck:  carotid pulses are full and equal bilaterally        Respiratory:  normal breath sounds, clear to auscultation, normal A-P diameter, normal symmetry, normal respiratory excursion, no use of accessory muscles         Cardiac: regular rhythm, normal S1 and S2, no S3 or S4       systolic murmur, RUSB, grade 1        pulses full and equal                                   Right groin ecchymoses, no hematoma no bruit, bandage removed    GI:    obese      Extremities and Muscular Skeletal:  no edema, no spinal abnormalities noted, normal muscle strength and tone              Neurological:  no gross motor deficits   Resting tremor    Psych:  affect appropriate, oriented to time, person and place        CC  Nabeel Allred MD  [id number]  [place] [id number]

## 2025-04-09 NOTE — LETTER
4/9/2025    [facility]  [facility] [address]    RE: Tony Bruce       Dear Colleague,     I had the pleasure of seeing Tony Bruce in the [facility].  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    Sick sinus syndrome with a pacemaker placed in [place] 2022.  Pacemaker has identified paroxysmal atrial fibrillation [facility] edoxaban 60 mg daily.  He also has obstructive sleep apnea for which he does not use a CPAP mask.     He had COVID in 03/2020.   Tony thought he had worsening dyspnea on exertion.  He had no chest, arm, neck, jaw or shoulder discomfort.  His activity is mostly limited by debilitating back pain.  A [facility] upon return to [place] demonstrated a small reversible defect in the basilar to mid inferior wall.  Ejection fraction was 63% without focal wall motion abnormalities.  BNP was 174.       Echocardiogram  09/2021 demonstrated ejection fraction of 60%-65% without focal wall motion abnormalities, no significant valvular pathology and normal pulmonary pressures.     Because of his abnormal stress test, we took him to the Cath Lab 2022, which demonstrated what appeared to be a chronically occluded right coronary artery filled by left to right collaterals.  He did have a distal left main stenosis in the 25% range.  The proximal LAD appeared to have a stenosis in the 50% -60% range.  The iFR of the left anterior descending artery was 0.85.  In an attempt to perform angioplasty of the proximal ostial LAD, I was unable to advance a balloon and I decided  Occupational Therapy Group Note:       02/21/22 1400   Group Therapy Session   Group Attendance attended group session   Total Time patient participated (minutes) 100   Total # Attendees 8   Group Type expressive therapy;recreation   Group Topic Covered coping skills/lifestyle management;leisure exploration/use of leisure time;problem-solving;structured socialization   Patient Response/Contribution cooperative with task;expressed understanding of topic;listened actively;organized     Pt attended 2 out of 3 OT groups offered. Pt actively participated in occupational therapy clinic to facilitate coping skill exploration, creative expression within personally meaningful activities, and clinical observation of social, cognitive, and kinesthetic performance skills. Pt response: Chosen activity: painting a wooden project. Independent to initiate, gather materials, sequence and adjust to workspace demands as needed. Demonstrated good focus, planning, problem solving, and attention to detail for this moderately complex task. Very organized and efficient in his task approach. Able to ask for assistance and appeared comfortable interacting with peers and staff, though he was notably quieter during group today.     Pt actively participated in a structured occupational therapy group involving music bingo to facilitate social engagement, reality orientation, improving mood, parasympathetic nervous system activation and coping with symptoms.  Pt demonstrated good attention to task and effective visual scanning. Pt remained actively engaged and added to the group discussion throughout. Affect appeared flat/depressed (appropriate to context considering recent significant loss).      Plan of Care Reviewed With: patient     Overall Patient Progress: improving                we would set up as a CHIP case as it was heavily calcified.     We added Imdur 15 mg to his regimen and Plavix and started him in Cardiac Rehab.  When he returned for followup, he thought he was feeling significantly better and did not want to follow up with the [facility] team.      May 2024 I sent him to the Cath Lab where Chad Blood performed successful intervention with Impella CP support undergoing rotational atherectomy and IVUS directed stent deployment of his left main and proximal left anterior descending artery.     He notes his dyspnea is improved but his activity is still mostly limited by orthopedic issues.  He notes in the grocery store when pushing a shopping cart he can walk much better.     He follows with neurology for his neuropathy and recent adjustments in his medications have helped with his daytime somnolence and sleepiness.    He recently purchased a pedaling apparatus which unfortunately he is not using yet.     Interrogation of device shows he A paces 68 per time of the time he paces in the ventricle 43% of the time.  He had no atrial or ventricular arrhythmias.  .     Preprocedure echocardiogram demonstrated ejection fraction of 60 to 65% without any valvular pathology.  IVC was described as normal.  Previous N-terminal proBNP's have been normal.  [facility].     ASSESSMENT AND PLAN:   Tony has no clear anginal symptoms and his dyspnea appears to be improved.  Activity level is limited by his orthopedic issues.  As I told him I think his dyspnea on exertion is multifactorial to what ever degree was contributed by the stenosis has now been alleviated and fortunately did not result in as much improvement as he hoped for.  I told him I think a significant component is age, deconditioning, orthopedic and obesity.  I recommended that he exercise regularly.      He is almost 1 year out and he can stop his clopidogrel and continue on edoxaban only.     Previous adjustments in  his pacemaker settings have also not resulted in a significant improvement     I think Tony's heart failure is very well compensated     Most recent fasting [facility] profile [facility] the [facility] from from 2 days ago shows total cholesterol of 84, HDL 36, LDL 35.  Triglycerides are blank.      Blood pressure is well controlled today at 118/68 with a pulse of 64     Weight is 230 pounds,.  Body mass index is 35.02.  His weight is down about 10 pounds from last May.  He states the [facility] is considering putting him on Ozempic and wants to know my opinion.  I recommended that he go on Ozempic.  If he does lose significant amount of weight his antihypertensive medicines will need to be adjusted.     Most recent hemoglobin is down a bit at 11.9.  He runs a chronic anemia.  As stated I am discontinuing clopidogrel at this time     I will have him follow-up with Dr. Chad Blood in 6 months.  If he should have any problems would be glad to see him sooner.  Sincerely,                                Nabeel Allred MD [facility]       Today's clinic visit entailed:  Review of external notes as documented elsewhere in note  Review of the result(s) of each unique test - device interrogation, lab  Ordering of each unique test  Prescription drug management  40 minutes spent by me on the date of the encounter doing chart review, history and exam, documentation and further activities per the note  Provider  Link to [facility] Help Grid           Orders Placed This Encounter   Procedures     Follow-Up with Cardiology       Orders Placed This Encounter   Medications     pregabalin (LYRICA) 75 MG capsule     Sig: Take 75 mg by mouth.       Medications Discontinued During This Encounter   Medication Reason     clopidogrel (PLAVIX) 75 MG tablet      ezetimibe (ZETIA) 10 MG tablet          Encounter Diagnoses   Name Primary?     Coronary artery disease involving native coronary artery of native heart without angina pectoris Yes     Class 2 obesity due to excess  "calories without serious comorbidity with body mass index (BMI) of 35.0 to 35.9 in adult      Pure hypercholesterolemia      Essential hypertension, benign      LIZANDRO (obstructive sleep apnea)      Chronic renal failure, stage 3a (H)      ÁLVAREZ (dyspnea on exertion)      Sick sinus syndrome (H)      Cardiac pacemaker in situ      PAF (paroxysmal atrial fibrillation) (H)      Essential tremor        CURRENT MEDICATIONS:  Current Outpatient Medications   Medication Sig Dispense Refill     acetaminophen 500 MG CAPS 2 tablets twice a day       atorvastatin (LIPITOR) 80 MG tablet Take 1 tablet (80 mg) by mouth daily 90 tablet 4     Calcium Carb-Cholecalciferol (CALCIUM-VITAMIN D3) 250-125 MG-UNIT TABS Take 2 tablets by mouth 2 times daily       cyanocobalamin (VITAMIN B-12) 1000 MCG tablet Take by mouth daily       diclofenac (VOLTAREN) 1 % GEL topical gel Place onto the skin as needed for moderate pain       docusate sodium (COLACE) 100 MG capsule Take 100 mg by mouth 2 times daily as needed for constipation       edoxaban ANTICOAGULANT (SAVAYSA) 60 MG TABS tablet Take 1 tablet (60 mg) by mouth daily 90 tablet 4     Emollient (VANICREAM EX) APPLY THIN LAYER    TWICE A DAY FOR DRY SKIN       ezetimibe (ZETIA) 10 MG tablet Take 1 tablet (10 mg) by mouth daily. 90 tablet 4     finasteride (PROSCAR) 5 MG tablet Take 5 mg by mouth daily       furosemide (LASIX) 20 MG tablet Take 1 tablet (20 mg) by mouth daily 30 tablet 3     hydrocortisone 2.5 % cream Apply topically 2 times daily       isosorbide mononitrate (IMDUR) 30 MG 24 hr tablet Take 15 mg by mouth daily       lidocaine (LIDODERM) 5 % Patch Place 1 patch onto the skin       losartan (COZAAR) 50 MG tablet Take 1 tablet (50 mg) by mouth daily 90 tablet 4     nitroGLYcerin (NITROSTAT) 0.4 MG sublingual tablet One tablet under the tongue every 5 minutes if needed for chest pain. May repeat every 5 minutes for a maximum of 3 doses in 15 minutes\" 25 tablet 3     omega 3 1000 " MG CAPS Take by mouth daily       omeprazole (PRILOSEC) 20 MG DR capsule Take 20 mg by mouth daily       OXcarbazepine (TRILEPTAL) 150 MG tablet Take 150 mg by mouth daily.       polyethylene glycol (MIRALAX/GLYCOLAX) Packet Take 1 packet by mouth as needed for constipation       pregabalin (LYRICA) 75 MG capsule Take 75 mg by mouth.       primidone (MYSOLINE) 250 MG tablet Take 300 mg by mouth 2 times daily       propranolol ER (INDERAL LA) 120 MG 24 hr capsule Take 120 mg by mouth daily       psyllium 0.52 g capsule Take 1 capsule by mouth daily       sodium fluoride dental gel (PREVIDENT) 1.1 % GEL topical gel Apply to affected area At Bedtime       spironolactone (ALDACTONE) 25 MG tablet Take 1 tablet (25 mg) by mouth daily 90 tablet 3     tamsulosin (FLOMAX) 0.4 MG capsule Take 0.4 mg by mouth daily       terbinafine (LAMISIL) 1 % external cream Apply topically 2 times daily       traMADol (ULTRAM) 50 MG tablet Take 50 mg by mouth 2 times daily.       Vitamin D, Cholecalciferol, 1000 units CAPS Take by mouth daily         ALLERGIES     Allergies   Allergen Reactions     Lisinopril      Morphine      confusion       PAST MEDICAL HISTORY:  Past Medical History:   Diagnosis Date     Coronary atherosclerosis of unspecified type of vessel, native or graft 08/2001    cardiac cath 5/2022: unsuccessful attempt to LAD-medical management; cath 2005: GER to LAD; cath 2002: GER to RCA     Essential hypertension, benign     INTERMITTENT HYPERTENSION     Essential tremor      Generalized osteoarthrosis, unspecified site      Hyperlipidaemia      Hypertrophy of prostate without urinary obstruction and other lower urinary tract symptoms (LUTS)     BPH - Dr Pinto     Impotence of organic origin     Dr Pinto - Dr Allred     NONSPECIFIC MEDICAL HISTORY     CERVICAL/LUMBAR RADICULOPATHY     NONSPECIFIC MEDICAL HISTORY     SHOULDER IMPINGEMENT     NSTEMI (non-ST elevated myocardial infarction) (H)     inferior 2001     Obese       Other and unspecified hyperlipidemia     ?     Peripheral neuropathy      Sick sinus syndrome (H)     s/p [employer] pacemaker implanted March 2022 in [place]       PAST SURGICAL HISTORY:  Past Surgical History:   Procedure Laterality Date     CARDIAC NUC DANISHA STRESS TEST NL  4/09    normal     COLONOSCOPY  6/06    normal - diverticulosis - dr adams     COLONOSCOPY  8/14/2012    Procedure: COLONOSCOPY;  COLONOSCOPY SCREENING/ 6 YEAR FOLLOW UP;  Surgeon: Rey Adams MD;  Location:  GI     CV CORONARY ANGIOGRAM N/A 5/25/2022    Procedure: Coronary Angiogram;  Surgeon: Nabeel Allred MD;  Location:  HEART CARDIAC CATH LAB     CV IMPELLA INSERTION N/A 5/9/2024    Procedure: Impella Insertion;  Surgeon: Con Blood MD;  Location:  HEART CARDIAC CATH LAB     CV IMPELLA REMOVAL N/A 5/9/2024    Procedure: Impella Removal;  Surgeon: Con Blood MD;  Location:  HEART CARDIAC CATH LAB     CV INSTANTANEOUS WAVE-FREE RATIO N/A 5/25/2022    Procedure: Instantaneous Wave-Free Ratio;  Surgeon: Nabeel Allred MD;  Location:  HEART CARDIAC CATH LAB     CV INTRAVASULAR ULTRASOUND N/A 5/9/2024    Procedure: Intravascular Ultrasound;  Surgeon: Con Blood MD;  Location:  HEART CARDIAC CATH LAB     CV LEFT HEART CATH N/A 5/25/2022    Procedure: Left Heart Catheterization;  Surgeon: Nabeel Allred MD;  Location:  HEART CARDIAC CATH LAB     CV LEFT HEART CATH N/A 5/9/2024    Procedure: Left Heart Catheterization;  Surgeon: Con Blood MD;  Location:  HEART CARDIAC CATH LAB     CV PCI N/A 5/25/2022    Procedure: Percutaneous Coronary Intervention;  Surgeon: Nabeel Allred MD;  Location: [facility] CARDIAC CATH LAB     CV PCI ATHERECTOMY ORBITAL N/A 5/9/2024    Procedure: Percutaneous Coronary Intervention - Atherectomy Rotational;  Surgeon: Con Blood MD;  Location: [facility] CARDIAC CATH LAB     CV PCI STENT DRUG ELUTING N/A 5/9/2024     Procedure: Percutaneous Coronary Intervention Stent;  Surgeon: Con Blood MD;  Location:  HEART CARDIAC CATH LAB     SURGICAL HISTORY OF -   1990    S/P CERVICAL DISCECTOMY x 2     SURGICAL HISTORY OF -   1990    S/P LUMBAR DISCECTOMY x 2     SURGICAL HISTORY OF -   1993    S/P LT CARPAL TUNNEL SURG/SHOULDER IMPINGEMENT     SURGICAL HISTORY OF -   8/01    S/P STENT OF RT CORONARY ARTERY     SURGICAL HISTORY OF -   9/05    Drug eluting stent to LAD     ZZC TOTAL KNEE ARTHROPLASTY  7/04    Knee Replacement, Total - LEFT Dr Regan     [facility]REDUC  9/04    dr dominique       FAMILY HISTORY:  Family History   Problem Relation Age of Onset     Respiratory Father         COPD     Cerebrovascular Disease Mother         CVA     C.A.D. Brother         CABG - after CABG x 3 - rheumatic fever as a child     Breast Cancer Sister      Cerebrovascular Disease Sister        SOCIAL HISTORY:  Social History     Socioeconomic History     Marital status:      Spouse name: paola     Number of children: 4     Years of education: 14     Highest education level: None   Tobacco Use     Smoking status: Never     Smokeless tobacco: Never   Substance and Sexual Activity     Alcohol use: No     Drug use: No     Sexual activity: Yes     Partners: Female   Other Topics Concern     Caffeine Concern Yes     Comment: 3-4 cups qd     Exercise Yes     Comment: limited     Seat Belt Yes       Review of Systems:  Skin:  not assessed       Eyes:  not assessed      ENT:  not assessed      Respiratory:  Negative       Cardiovascular:  Negative      Gastroenterology: not assessed      Genitourinary:  not assessed      Musculoskeletal:  not assessed      Neurologic:  not assessed      Psychiatric:  not assessed      Heme/Lymph/Imm:  not assessed      Endocrine:  not assessed        Physical Exam:  Vitals: /68 (BP Location: Right arm, Patient Position: Sitting, Cuff Size: Adult Regular)   Pulse 64    "Ht 1.727 m (5' 8\")   Wt 104.6 kg (230 lb 9.6 oz)   SpO2 95%   BMI 35.06 kg/m      Constitutional:  cooperative, alert and oriented, well developed, well nourished, in no acute distress obese      Skin:  warm and dry to the touch, no apparent skin lesions or masses noted          Head:  normocephalic, no masses or lesions        Eyes:  pupils equal and round, conjunctivae and lids unremarkable, sclera white, no xanthalasma, EOMS intact, no nystagmus        Lymph:      ENT:  no pallor or cyanosis, dentition good        Neck:  carotid pulses are full and equal bilaterally        Respiratory:  normal breath sounds, clear to auscultation, normal A-P diameter, normal symmetry, normal respiratory excursion, no use of accessory muscles         Cardiac: regular rhythm, normal S1 and S2, no S3 or S4       systolic murmur, RUSB, grade 1        pulses full and equal                                   Right groin ecchymoses, no hematoma no bruit, bandage removed    GI:    obese      Extremities and Muscular Skeletal:  no edema, no spinal abnormalities noted, normal muscle strength and tone              Neurological:  no gross motor deficits   Resting tremor    Psych:  affect appropriate, oriented to time, person and place        CC  Nabeel Allred MD  [address]  [place] [address]                  Thank you for allowing me to participate in the care of your patient.      Sincerely,     Nabeel Allred MD     [facility]  cc:   Nabeel Allred MD  [address]  [place] [id number]      "

## 2025-04-13 ENCOUNTER — HEALTH MAINTENANCE LETTER (OUTPATIENT)
Age: 85
End: 2025-04-13

## 2025-04-14 ENCOUNTER — TELEPHONE (OUTPATIENT)
Dept: CARDIOLOGY | Facility: CLINIC | Age: 85
End: 2025-04-14
Payer: COMMERCIAL

## 2025-04-14 NOTE — TELEPHONE ENCOUNTER
Message from patient and spouse: they are clear about stopping the plavix once the current bottle is done. Patient is on savaysa 60mg daily.  However, they wanted to double check if he should also go back on the ASA 81mg daily.    Per Dr. Allred's dictation 4/9/2025: He is almost 1 year out and he can stop his clopidogrel and continue on edoxaban only.     Will message Dr. Allred to review      4/15/2025 1020 reply from Dr. Allred:  No Savaysa alone is good enough     Called patient's spouse with update.

## 2025-06-04 ENCOUNTER — ANCILLARY PROCEDURE (OUTPATIENT)
Dept: CARDIOLOGY | Facility: CLINIC | Age: 85
End: 2025-06-04
Attending: INTERNAL MEDICINE
Payer: COMMERCIAL

## 2025-06-04 DIAGNOSIS — I49.5 SICK SINUS SYNDROME (H): ICD-10-CM

## 2025-06-04 DIAGNOSIS — Z95.0 CARDIAC PACEMAKER IN SITU: ICD-10-CM

## 2025-06-04 LAB
MDC_IDC_LEAD_CONNECTION_STATUS: NORMAL
MDC_IDC_LEAD_CONNECTION_STATUS: NORMAL
MDC_IDC_LEAD_IMPLANT_DT: NORMAL
MDC_IDC_LEAD_IMPLANT_DT: NORMAL
MDC_IDC_LEAD_LOCATION: NORMAL
MDC_IDC_LEAD_LOCATION: NORMAL
MDC_IDC_LEAD_LOCATION_DETAIL_1: NORMAL
MDC_IDC_LEAD_LOCATION_DETAIL_1: NORMAL
MDC_IDC_LEAD_MFG: NORMAL
MDC_IDC_LEAD_MFG: NORMAL
MDC_IDC_LEAD_MODEL: NORMAL
MDC_IDC_LEAD_MODEL: NORMAL
MDC_IDC_LEAD_POLARITY_TYPE: NORMAL
MDC_IDC_LEAD_POLARITY_TYPE: NORMAL
MDC_IDC_LEAD_SERIAL: NORMAL
MDC_IDC_LEAD_SERIAL: NORMAL
MDC_IDC_MSMT_BATTERY_REMAINING_LONGEVITY: 61 MO
MDC_IDC_MSMT_BATTERY_STATUS: NORMAL
MDC_IDC_MSMT_BATTERY_VOLTAGE: 2.98 V
MDC_IDC_MSMT_LEADCHNL_RA_IMPEDANCE_VALUE: 412.5 OHM
MDC_IDC_MSMT_LEADCHNL_RA_PACING_THRESHOLD_AMPLITUDE: 0.75 V
MDC_IDC_MSMT_LEADCHNL_RA_PACING_THRESHOLD_AMPLITUDE: 0.75 V
MDC_IDC_MSMT_LEADCHNL_RA_PACING_THRESHOLD_PULSEWIDTH: 0.4 MS
MDC_IDC_MSMT_LEADCHNL_RA_PACING_THRESHOLD_PULSEWIDTH: 0.4 MS
MDC_IDC_MSMT_LEADCHNL_RA_SENSING_INTR_AMPL: 3.9 MV
MDC_IDC_MSMT_LEADCHNL_RV_IMPEDANCE_VALUE: 487.5 OHM
MDC_IDC_MSMT_LEADCHNL_RV_PACING_THRESHOLD_AMPLITUDE: 0.75 V
MDC_IDC_MSMT_LEADCHNL_RV_PACING_THRESHOLD_AMPLITUDE: 0.75 V
MDC_IDC_MSMT_LEADCHNL_RV_PACING_THRESHOLD_PULSEWIDTH: 0.4 MS
MDC_IDC_MSMT_LEADCHNL_RV_PACING_THRESHOLD_PULSEWIDTH: 0.4 MS
MDC_IDC_MSMT_LEADCHNL_RV_SENSING_INTR_AMPL: 9 MV
MDC_IDC_PG_IMPLANT_DTM: NORMAL
MDC_IDC_PG_MFG: NORMAL
MDC_IDC_PG_MODEL: NORMAL
MDC_IDC_PG_SERIAL: NORMAL
MDC_IDC_PG_TYPE: NORMAL
MDC_IDC_SESS_CLINIC_NAME: NORMAL
MDC_IDC_SESS_DTM: NORMAL
MDC_IDC_SESS_TYPE: NORMAL
MDC_IDC_SET_BRADY_AT_MODE_SWITCH_MODE: NORMAL
MDC_IDC_SET_BRADY_AT_MODE_SWITCH_RATE: 180 {BEATS}/MIN
MDC_IDC_SET_BRADY_HYSTRATE: NORMAL
MDC_IDC_SET_BRADY_LOWRATE: 60 {BEATS}/MIN
MDC_IDC_SET_BRADY_MAX_SENSOR_RATE: 130 {BEATS}/MIN
MDC_IDC_SET_BRADY_MAX_TRACKING_RATE: 120 {BEATS}/MIN
MDC_IDC_SET_BRADY_MODE: NORMAL
MDC_IDC_SET_BRADY_NIGHT_RATE: NORMAL
MDC_IDC_SET_BRADY_PAV_DELAY_LOW: 200 MS
MDC_IDC_SET_BRADY_SAV_DELAY_LOW: 170 MS
MDC_IDC_SET_LEADCHNL_RA_PACING_AMPLITUDE: 1.5 V
MDC_IDC_SET_LEADCHNL_RA_PACING_CAPTURE_MODE: NORMAL
MDC_IDC_SET_LEADCHNL_RA_PACING_POLARITY: NORMAL
MDC_IDC_SET_LEADCHNL_RA_PACING_PULSEWIDTH: 0.4 MS
MDC_IDC_SET_LEADCHNL_RA_SENSING_ADAPTATION_MODE: NORMAL
MDC_IDC_SET_LEADCHNL_RA_SENSING_POLARITY: NORMAL
MDC_IDC_SET_LEADCHNL_RA_SENSING_SENSITIVITY: 0.3 MV
MDC_IDC_SET_LEADCHNL_RV_PACING_AMPLITUDE: 0.88
MDC_IDC_SET_LEADCHNL_RV_PACING_CAPTURE_MODE: NORMAL
MDC_IDC_SET_LEADCHNL_RV_PACING_POLARITY: NORMAL
MDC_IDC_SET_LEADCHNL_RV_PACING_PULSEWIDTH: 0.4 MS
MDC_IDC_SET_LEADCHNL_RV_SENSING_POLARITY: NORMAL
MDC_IDC_SET_LEADCHNL_RV_SENSING_SENSITIVITY: 0.5 MV
MDC_IDC_STAT_AT_MODE_SW_COUNT: 2
MDC_IDC_STAT_BRADY_RA_PERCENT_PACED: 57 %
MDC_IDC_STAT_BRADY_RV_PERCENT_PACED: 63 %

## 2025-06-04 PROCEDURE — 93280 PM DEVICE PROGR EVAL DUAL: CPT | Performed by: INTERNAL MEDICINE

## 2025-06-24 ENCOUNTER — TELEPHONE (OUTPATIENT)
Dept: CARDIOLOGY | Facility: CLINIC | Age: 85
End: 2025-06-24
Payer: COMMERCIAL

## 2025-06-24 NOTE — TELEPHONE ENCOUNTER
Pt left  stating his b/p is 95/61 and his HR is 40.  States he has a pacemaker and his LRL is set to 60    Pt has [employer] PPM. Last in clinic device check showed SR 60 with frequent PVC's. Likely the cause of his HR discrepancy.   More concerning is the b/p of 95/61.  And 85/51     Called pt who states he gets light headed at times. States he was started on Metoprolol last week on Monday by [facility].  ( Propranolol was discontinued)    Reviewed readings of low HR. Explained this was due to his frequent PVC's and was not accurate.   Reviewed b/p readings. Told him these were too low and maybe his new medication needs to be adjusted.  Pt states [facility] will call the [facility] and review this with them.    Pt has seen Dr Allred in the past who is now retired. They wanted to make appointment with Dr Blood. Orders in. Message sent to scheduling to assist in setting up this appointment. [facility] RN callback number: [phone]   provided if further concerns.    Gilberto MONTANEZ

## 2025-08-24 ENCOUNTER — HOSPITAL ENCOUNTER (EMERGENCY)
Facility: CLINIC | Age: 85
Discharge: HOME | End: 2025-08-24
Attending: EMERGENCY MEDICINE | Admitting: EMERGENCY MEDICINE
Payer: COMMERCIAL

## 2025-08-24 ENCOUNTER — APPOINTMENT (OUTPATIENT)
Dept: GENERAL RADIOLOGY | Facility: CLINIC | Age: 85
End: 2025-08-24
Attending: EMERGENCY MEDICINE
Payer: COMMERCIAL

## 2025-08-24 VITALS
DIASTOLIC BLOOD PRESSURE: 57 MMHG | RESPIRATION RATE: 18 BRPM | HEART RATE: 60 BPM | TEMPERATURE: 98.6 F | SYSTOLIC BLOOD PRESSURE: 127 MMHG | OXYGEN SATURATION: 100 %

## 2025-08-24 DIAGNOSIS — S61.512A LACERATION OF LEFT WRIST, INITIAL ENCOUNTER: Primary | ICD-10-CM

## 2025-08-24 PROCEDURE — 12002 RPR S/N/AX/GEN/TRNK2.6-7.5CM: CPT | Mod: LT

## 2025-08-24 PROCEDURE — 73110 X-RAY EXAM OF WRIST: CPT | Mod: LT

## 2025-08-24 PROCEDURE — 99283 EMERGENCY DEPT VISIT LOW MDM: CPT | Performed by: EMERGENCY MEDICINE

## 2025-08-24 RX ORDER — BUPIVACAINE HYDROCHLORIDE 5 MG/ML
5 INJECTION, SOLUTION EPIDURAL; INTRACAUDAL; PERINEURAL ONCE
Status: DISCONTINUED | OUTPATIENT
Start: 2025-08-24 | End: 2025-08-24 | Stop reason: HOSPADM

## 2025-08-24 ASSESSMENT — COLUMBIA-SUICIDE SEVERITY RATING SCALE - C-SSRS
6. HAVE YOU EVER DONE ANYTHING, STARTED TO DO ANYTHING, OR PREPARED TO DO ANYTHING TO END YOUR LIFE?: NO
1. IN THE PAST MONTH, HAVE YOU WISHED YOU WERE DEAD OR WISHED YOU COULD GO TO SLEEP AND NOT WAKE UP?: NO
2. HAVE YOU ACTUALLY HAD ANY THOUGHTS OF KILLING YOURSELF IN THE PAST MONTH?: NO

## 2025-08-24 ASSESSMENT — ACTIVITIES OF DAILY LIVING (ADL)
ADLS_ACUITY_SCORE: 58
ADLS_ACUITY_SCORE: 58

## (undated) DEVICE — NDL PERC ENTRY THINWALL 18GA 7.0" G00166

## (undated) DEVICE — RAD INFLATOR BASIC COMPAK  IN4130

## (undated) DEVICE — CATH TURNPIKE LP 135CM

## (undated) DEVICE — TOTE ANGIO CORP PC15AT SAN32CC83O

## (undated) DEVICE — MANIFOLD KIT ANGIO AUTOMATED 014613

## (undated) DEVICE — KIT HAND CONTROL ANGIOTOUCH ACIST 65CM AT-P65

## (undated) DEVICE — DEVICE EXCHANGE TRAPPER

## (undated) DEVICE — CATH ANGIO JUDKINS JL4 6FRX100CM INFINITI 534620T

## (undated) DEVICE — WIRE GUIDE 0.035"X260CM SAFE-T-J EXCHANGE G00517

## (undated) DEVICE — CATH ANGIO INFINITI PIGTAIL 145 6 SH 6FRX110CM  534-652S

## (undated) DEVICE — CATH ANGIO SUPERTORQUE AL1 6FRX100CM 532-645

## (undated) DEVICE — CATH MICROCATH 1.9FRX135CML ASAHI CARAVEL CRV135-19P

## (undated) DEVICE — Device

## (undated) DEVICE — CATH ANGIO INFINITI MPA2 4FRX100CM 2 SH 538442

## (undated) DEVICE — CATH BALLOON CUTTING WOLVERINE 2.75X10MH74939401102750

## (undated) DEVICE — DEFIB PRO-PADZ LVP LQD GEL ADULT 8900-2105-01

## (undated) DEVICE — CATH ANGIO JUDKINS R4 6FRX100CM INFINITI 534621T

## (undated) DEVICE — CATHETER BALLOON DILATATION TAKERU RX 2.5X12MM DC-RZ2512UA2

## (undated) DEVICE — CATH BALLOON NC EMERGE 3.00X20MM H7493926720300

## (undated) DEVICE — CATH TURNPIKE SPIRAL 135CM 5640

## (undated) DEVICE — CATH IVUS OPTICROSS HD 6 3.6FR 1.18MM DIA 135CML H7493935408

## (undated) DEVICE — GW VASC 190CM .014IN HI-TRQ 1009660J

## (undated) DEVICE — GUIDEWIRE ROTAWIRE DRIVE FLOPPY H74939462005

## (undated) DEVICE — CATH GUIDING BLUE YELLOW PTFE XB3.5 6FRX100CM 67005400

## (undated) DEVICE — CATH GUIDING 7FR XB 3.5 VSTBRT STRL

## (undated) DEVICE — SYR ANGIOGRAPHY MULTIUSE KIT ACIST 014612

## (undated) DEVICE — RAD INTRODUCER KIT MICRO 5FRX10CM .018 NITINOL G/W

## (undated) DEVICE — INTRO GLIDESHEATH SLENDER 6FR 10X45CM 60-1060

## (undated) DEVICE — WIRE GUIDE 0.018"X260CM PLATINUM PLUS SHORT TAPER M001466050

## (undated) DEVICE — CATH RX TAKERU PTCA BALLOON 2.00MM X 15MM

## (undated) DEVICE — CATH RX TAKERU PTCA BALLOON 1.5X12MM DC-RY1512UA1

## (undated) DEVICE — GUIDEWIRE VASC 0.014INX180CM RUNTHROUGH 25-1011

## (undated) DEVICE — SLEEVE TR BAND RADIAL COMPRESSION DEVICE 29CM XX-RF06L

## (undated) DEVICE — GUIDEWIRE VASC 0.014INX190CM J TIP CGRXT190HJ

## (undated) DEVICE — GW VASC OMNIWIRE J L185CM PRESSURE 89185J

## (undated) DEVICE — KIT LG BORE TOUHY ACCESS PLUS MAP152

## (undated) DEVICE — GUIDEWIRE VASC MICROGLIDE 0.014INX190CM 22299M-W2

## (undated) DEVICE — INTRO SHEATH 45CM  7FR PNCL DEST

## (undated) DEVICE — CATH BALLOON NC EMERGE 4.00X8MM H7493926708400

## (undated) DEVICE — WIRE GUIDE 0.035"X260CM AMPTLAZ XSTIFF CVD THSCF-35-260-3-A

## (undated) DEVICE — CATH LAUNCHER 6FR LA6EBU375

## (undated) DEVICE — INTRO SHEATH 6FRX10CM PINNACLE RSS602

## (undated) DEVICE — CATH IMPELLA 3.5 CP PUMP SET 0048-0003

## (undated) DEVICE — VALVE HEMOSTASIS GUARDIAN II OD8 FR GUIDEWIRE 8215

## (undated) DEVICE — BURR ROTAPRO OS 2.0 1.50MM H749394671500

## (undated) RX ORDER — NITROGLYCERIN 5 MG/ML
VIAL (ML) INTRAVENOUS
Status: DISPENSED
Start: 2024-05-09

## (undated) RX ORDER — VERAPAMIL HYDROCHLORIDE 2.5 MG/ML
INJECTION, SOLUTION INTRAVENOUS
Status: DISPENSED
Start: 2024-05-09

## (undated) RX ORDER — FENTANYL CITRATE 50 UG/ML
INJECTION, SOLUTION INTRAMUSCULAR; INTRAVENOUS
Status: DISPENSED
Start: 2024-05-09

## (undated) RX ORDER — VERAPAMIL HYDROCHLORIDE 2.5 MG/ML
INJECTION, SOLUTION INTRAVENOUS
Status: DISPENSED
Start: 2022-05-25

## (undated) RX ORDER — LIDOCAINE HYDROCHLORIDE 10 MG/ML
INJECTION, SOLUTION EPIDURAL; INFILTRATION; INTRACAUDAL; PERINEURAL
Status: DISPENSED
Start: 2022-05-25

## (undated) RX ORDER — HEPARIN SODIUM 1000 [USP'U]/ML
INJECTION, SOLUTION INTRAVENOUS; SUBCUTANEOUS
Status: DISPENSED
Start: 2024-05-09

## (undated) RX ORDER — HEPARIN SODIUM 1000 [USP'U]/ML
INJECTION, SOLUTION INTRAVENOUS; SUBCUTANEOUS
Status: DISPENSED
Start: 2022-05-25

## (undated) RX ORDER — LIDOCAINE HYDROCHLORIDE 10 MG/ML
INJECTION, SOLUTION EPIDURAL; INFILTRATION; INTRACAUDAL; PERINEURAL
Status: DISPENSED
Start: 2024-05-09

## (undated) RX ORDER — ONDANSETRON 2 MG/ML
INJECTION INTRAMUSCULAR; INTRAVENOUS
Status: DISPENSED
Start: 2024-05-09

## (undated) RX ORDER — HEPARIN SODIUM 200 [USP'U]/100ML
INJECTION, SOLUTION INTRAVENOUS
Status: DISPENSED
Start: 2022-05-25

## (undated) RX ORDER — ONDANSETRON 2 MG/ML
INJECTION INTRAMUSCULAR; INTRAVENOUS
Status: DISPENSED
Start: 2022-05-25

## (undated) RX ORDER — NITROGLYCERIN 5 MG/ML
VIAL (ML) INTRAVENOUS
Status: DISPENSED
Start: 2022-05-25

## (undated) RX ORDER — CLOPIDOGREL 300 MG/1
TABLET, FILM COATED ORAL
Status: DISPENSED
Start: 2022-05-25

## (undated) RX ORDER — FENTANYL CITRATE 50 UG/ML
INJECTION, SOLUTION INTRAMUSCULAR; INTRAVENOUS
Status: DISPENSED
Start: 2022-05-25

## (undated) RX ORDER — HEPARIN SODIUM 200 [USP'U]/100ML
INJECTION, SOLUTION INTRAVENOUS
Status: DISPENSED
Start: 2024-05-09

## (undated) RX ORDER — ONDANSETRON 4 MG/1
TABLET, ORALLY DISINTEGRATING ORAL
Status: DISPENSED
Start: 2024-05-09

## (undated) RX ORDER — OXYCODONE HYDROCHLORIDE 5 MG/1
TABLET ORAL
Status: DISPENSED
Start: 2024-05-09